# Patient Record
Sex: MALE | HISPANIC OR LATINO
[De-identification: names, ages, dates, MRNs, and addresses within clinical notes are randomized per-mention and may not be internally consistent; named-entity substitution may affect disease eponyms.]

---

## 2017-02-06 ENCOUNTER — MEDICATION RENEWAL (OUTPATIENT)
Age: 82
End: 2017-02-06

## 2017-02-10 DIAGNOSIS — R19.7 DIARRHEA, UNSPECIFIED: ICD-10-CM

## 2017-10-11 ENCOUNTER — APPOINTMENT (OUTPATIENT)
Dept: INTERNAL MEDICINE | Facility: CLINIC | Age: 82
End: 2017-10-11

## 2017-10-11 PROBLEM — R19.7 DIARRHEA, UNSPECIFIED TYPE: Status: ACTIVE | Noted: 2017-10-11

## 2018-09-21 ENCOUNTER — APPOINTMENT (OUTPATIENT)
Dept: INTERNAL MEDICINE | Facility: CLINIC | Age: 83
End: 2018-09-21

## 2019-03-18 ENCOUNTER — LABORATORY RESULT (OUTPATIENT)
Age: 84
End: 2019-03-18

## 2019-03-18 ENCOUNTER — APPOINTMENT (OUTPATIENT)
Dept: INTERNAL MEDICINE | Facility: CLINIC | Age: 84
End: 2019-03-18
Payer: MEDICARE

## 2019-03-18 VITALS — WEIGHT: 107 LBS | BODY MASS INDEX: 19.94 KG/M2 | HEIGHT: 61.42 IN

## 2019-03-18 VITALS — SYSTOLIC BLOOD PRESSURE: 150 MMHG | DIASTOLIC BLOOD PRESSURE: 80 MMHG

## 2019-03-18 DIAGNOSIS — E78.5 HYPERLIPIDEMIA, UNSPECIFIED: ICD-10-CM

## 2019-03-18 DIAGNOSIS — Z86.39 PERSONAL HISTORY OF OTHER ENDOCRINE, NUTRITIONAL AND METABOLIC DISEASE: ICD-10-CM

## 2019-03-18 DIAGNOSIS — Z87.891 PERSONAL HISTORY OF NICOTINE DEPENDENCE: ICD-10-CM

## 2019-03-18 DIAGNOSIS — D49.59 NEOPLASM UNSPECIFIED BEHAVIOR OF OTHER GENITOURINARY ORGAN: ICD-10-CM

## 2019-03-18 PROCEDURE — 99397 PER PM REEVAL EST PAT 65+ YR: CPT

## 2019-03-18 PROCEDURE — 93000 ELECTROCARDIOGRAM COMPLETE: CPT

## 2019-03-18 RX ORDER — AMLODIPINE BESYLATE 2.5 MG/1
2.5 TABLET ORAL DAILY
Qty: 90 | Refills: 1 | Status: DISCONTINUED | COMMUNITY
Start: 2017-02-06 | End: 2019-03-18

## 2019-03-18 RX ORDER — PANTOPRAZOLE 40 MG/1
40 TABLET, DELAYED RELEASE ORAL DAILY
Qty: 10 | Refills: 0 | Status: DISCONTINUED | COMMUNITY
Start: 2017-10-11 | End: 2019-03-18

## 2019-03-18 RX ORDER — L.ACIDOPH/L.BULG/B.BIF/S.THERM 1B-250 MG
TABLET ORAL
Qty: 21 | Refills: 0 | Status: DISCONTINUED | COMMUNITY
Start: 2017-10-11 | End: 2019-03-18

## 2019-03-18 NOTE — HISTORY OF PRESENT ILLNESS
[de-identified] : PT COMES FOR CPE BUT ALSO WITH CC OF R ANKLE EDEMA AFTER A FALL AND SPRAIN 10 DAYS AGO;ABLE TO WALK UNASSISTED WITH NO PAIN BUT HAS DIFFICULTY FITTING DRESS SHOES \par TAKES NO MEDS([facility] HAD BEEN RX IN THE PAST BUT STOPPED IT )

## 2019-03-18 NOTE — ASSESSMENT
[FreeTextEntry1] : CPE OF 89 Y OLD MALE WITH PMX OF HTN ON NO MED AS PER HIS PREFERENCE= LABS AND EKG TODAY\par S/P R ANKLE SPRAIN = RECOMM TO SEE POD BUT DECLINES\par RTO PRN

## 2019-03-18 NOTE — PHYSICAL EXAM
[No Acute Distress] : no acute distress [Well Nourished] : well nourished [Well Developed] : well developed [Well-Appearing] : well-appearing [Normal Sclera/Conjunctiva] : normal sclera/conjunctiva [PERRL] : pupils equal round and reactive to light [EOMI] : extraocular movements intact [Normal Outer Ear/Nose] : the outer ears and nose were normal in appearance [Normal Oropharynx] : the oropharynx was normal [No JVD] : no jugular venous distention [Supple] : supple [No Lymphadenopathy] : no lymphadenopathy [Thyroid Normal, No Nodules] : the thyroid was normal and there were no nodules present [Clear to Auscultation] : lungs were clear to auscultation bilaterally [No Respiratory Distress] : no respiratory distress  [No Accessory Muscle Use] : no accessory muscle use [Normal Rate] : normal rate  [Regular Rhythm] : with a regular rhythm [Normal S1, S2] : normal S1 and S2 [No Carotid Bruits] : no carotid bruits [No Abdominal Bruit] : a ~M bruit was not heard ~T in the abdomen [No Varicosities] : no varicosities [Pedal Pulses Present] : the pedal pulses are present [No Edema] : there was no peripheral edema [No Palpable Aorta] : no palpable aorta [No Extremity Clubbing/Cyanosis] : no extremity clubbing/cyanosis [Soft] : abdomen soft [Non Tender] : non-tender [Non-distended] : non-distended [No Masses] : no abdominal mass palpated [Normal Bowel Sounds] : normal bowel sounds [No HSM] : no HSM [Normal Anterior Cervical Nodes] : no anterior cervical lymphadenopathy [Normal Posterior Cervical Nodes] : no posterior cervical lymphadenopathy [No Joint Swelling] : no joint swelling [No CVA Tenderness] : no CVA  tenderness [No Spinal Tenderness] : no spinal tenderness [No Rash] : no rash [Grossly Normal Strength/Tone] : grossly normal strength/tone [Normal Gait] : normal gait [Coordination Grossly Intact] : coordination grossly intact [Deep Tendon Reflexes (DTR)] : deep tendon reflexes were 2+ and symmetric [No Focal Deficits] : no focal deficits [Normal Affect] : the affect was normal [Normal Insight/Judgement] : insight and judgment were intact [de-identified] : LESLY 2/6 RUSB [de-identified] : R MEDIAL MALLEOLAR EDEMA AND MINOR ECCHYMOSIS,FROM OF R ANKLE WITH MINIMAL TENDERNESS

## 2019-03-19 LAB
25(OH)D3 SERPL-MCNC: 9.8 NG/ML
ALBUMIN SERPL ELPH-MCNC: 4.2 G/DL
ALP BLD-CCNC: 84 U/L
ALT SERPL-CCNC: 21 U/L
ANION GAP SERPL CALC-SCNC: 12 MMOL/L
APPEARANCE: ABNORMAL
AST SERPL-CCNC: 24 U/L
BASOPHILS # BLD AUTO: 0.03 K/UL
BASOPHILS NFR BLD AUTO: 0.4 %
BILIRUB SERPL-MCNC: 0.2 MG/DL
BILIRUBIN URINE: NEGATIVE
BLOOD URINE: NEGATIVE
BUN SERPL-MCNC: 28 MG/DL
CALCIUM SERPL-MCNC: 9.2 MG/DL
CHLORIDE SERPL-SCNC: 102 MMOL/L
CO2 SERPL-SCNC: 23 MMOL/L
COLOR: NORMAL
CREAT SERPL-MCNC: 0.82 MG/DL
EOSINOPHIL # BLD AUTO: 0.08 K/UL
EOSINOPHIL NFR BLD AUTO: 1.1 %
GLUCOSE QUALITATIVE U: NEGATIVE
GLUCOSE SERPL-MCNC: 105 MG/DL
HCT VFR BLD CALC: 38.1 %
HGB BLD-MCNC: 11.8 G/DL
IMM GRANULOCYTES NFR BLD AUTO: 0.4 %
KETONES URINE: NEGATIVE
LEUKOCYTE ESTERASE URINE: NEGATIVE
LYMPHOCYTES # BLD AUTO: 1.01 K/UL
LYMPHOCYTES NFR BLD AUTO: 14.3 %
MAN DIFF?: NORMAL
MCHC RBC-ENTMCNC: 28.1 PG
MCHC RBC-ENTMCNC: 31 GM/DL
MCV RBC AUTO: 90.7 FL
MONOCYTES # BLD AUTO: 0.68 K/UL
MONOCYTES NFR BLD AUTO: 9.6 %
NEUTROPHILS # BLD AUTO: 5.25 K/UL
NEUTROPHILS NFR BLD AUTO: 74.2 %
NITRITE URINE: NEGATIVE
PH URINE: 5
PLATELET # BLD AUTO: 233 K/UL
POTASSIUM SERPL-SCNC: 4.2 MMOL/L
PROT SERPL-MCNC: 6.8 G/DL
PROTEIN URINE: NEGATIVE
RBC # BLD: 4.2 M/UL
RBC # FLD: 12.5 %
SODIUM SERPL-SCNC: 137 MMOL/L
SPECIFIC GRAVITY URINE: 1.02
TSH SERPL-ACNC: 0.79 UIU/ML
UROBILINOGEN URINE: NORMAL
WBC # FLD AUTO: 7.08 K/UL

## 2019-03-26 ENCOUNTER — MEDICATION RENEWAL (OUTPATIENT)
Age: 84
End: 2019-03-26

## 2019-03-26 LAB
CHOLEST SERPL-MCNC: 159 MG/DL
CHOLEST/HDLC SERPL: 2.7 RATIO
HDLC SERPL-MCNC: 60 MG/DL
LDLC SERPL CALC-MCNC: 81 MG/DL
TRIGL SERPL-MCNC: 90 MG/DL

## 2021-08-25 ENCOUNTER — NON-APPOINTMENT (OUTPATIENT)
Age: 86
End: 2021-08-25

## 2021-08-25 ENCOUNTER — APPOINTMENT (OUTPATIENT)
Dept: INTERNAL MEDICINE | Facility: CLINIC | Age: 86
End: 2021-08-25
Payer: MEDICARE

## 2021-08-25 VITALS
BODY MASS INDEX: 19.2 KG/M2 | HEART RATE: 67 BPM | SYSTOLIC BLOOD PRESSURE: 129 MMHG | RESPIRATION RATE: 14 BRPM | TEMPERATURE: 98.1 F | DIASTOLIC BLOOD PRESSURE: 64 MMHG | OXYGEN SATURATION: 96 % | WEIGHT: 103 LBS | HEIGHT: 61.5 IN

## 2021-08-25 DIAGNOSIS — I10 ESSENTIAL (PRIMARY) HYPERTENSION: ICD-10-CM

## 2021-08-25 DIAGNOSIS — K05.10 CHRONIC GINGIVITIS, PLAQUE INDUCED: ICD-10-CM

## 2021-08-25 LAB
25(OH)D3 SERPL-MCNC: 18.6 NG/ML
ALBUMIN SERPL ELPH-MCNC: 4.5 G/DL
ALP BLD-CCNC: 79 U/L
ALT SERPL-CCNC: 21 U/L
ANION GAP SERPL CALC-SCNC: 14 MMOL/L
AST SERPL-CCNC: 27 U/L
BASOPHILS # BLD AUTO: 0.04 K/UL
BASOPHILS NFR BLD AUTO: 0.4 %
BILIRUB SERPL-MCNC: 0.4 MG/DL
BUN SERPL-MCNC: 16 MG/DL
CALCIUM SERPL-MCNC: 9.5 MG/DL
CHLORIDE SERPL-SCNC: 99 MMOL/L
CHOLEST SERPL-MCNC: 197 MG/DL
CO2 SERPL-SCNC: 20 MMOL/L
CREAT SERPL-MCNC: 0.73 MG/DL
EOSINOPHIL # BLD AUTO: 0.09 K/UL
EOSINOPHIL NFR BLD AUTO: 0.9 %
GLUCOSE SERPL-MCNC: 88 MG/DL
HCT VFR BLD CALC: 38.7 %
HDLC SERPL-MCNC: 76 MG/DL
HGB BLD-MCNC: 11.8 G/DL
IMM GRANULOCYTES NFR BLD AUTO: 0.3 %
LDLC SERPL CALC-MCNC: 103 MG/DL
LYMPHOCYTES # BLD AUTO: 1.41 K/UL
LYMPHOCYTES NFR BLD AUTO: 14.1 %
MAN DIFF?: NORMAL
MCHC RBC-ENTMCNC: 27.2 PG
MCHC RBC-ENTMCNC: 30.5 GM/DL
MCV RBC AUTO: 89.2 FL
MONOCYTES # BLD AUTO: 1.05 K/UL
MONOCYTES NFR BLD AUTO: 10.5 %
NEUTROPHILS # BLD AUTO: 7.35 K/UL
NEUTROPHILS NFR BLD AUTO: 73.8 %
NONHDLC SERPL-MCNC: 120 MG/DL
PLATELET # BLD AUTO: 235 K/UL
POTASSIUM SERPL-SCNC: 4.7 MMOL/L
PROT SERPL-MCNC: 7.4 G/DL
RBC # BLD: 4.34 M/UL
RBC # FLD: 13.3 %
SODIUM SERPL-SCNC: 133 MMOL/L
TRIGL SERPL-MCNC: 86 MG/DL
TSH SERPL-ACNC: 0.8 UIU/ML
VIT B12 SERPL-MCNC: 1372 PG/ML
WBC # FLD AUTO: 9.97 K/UL

## 2021-08-25 PROCEDURE — 93000 ELECTROCARDIOGRAM COMPLETE: CPT

## 2021-08-25 PROCEDURE — 99397 PER PM REEVAL EST PAT 65+ YR: CPT

## 2021-08-25 NOTE — ASSESSMENT
[FreeTextEntry1] : CPE OF 89 Y OLD MALE = LABS AND EKG \par HX OF ANEMIA 2.5 Y AGO = NEVER SAW GI \par RECURRENT GINGIVITIS= TO SEE DENTIST ,AMOX RX SENT TILL SEEN \par HAD COVID-19 VACCINE

## 2021-08-25 NOTE — PHYSICAL EXAM
[No Acute Distress] : no acute distress [Well Nourished] : well nourished [Well Developed] : well developed [Well-Appearing] : well-appearing [Normal Sclera/Conjunctiva] : normal sclera/conjunctiva [PERRL] : pupils equal round and reactive to light [EOMI] : extraocular movements intact [No JVD] : no jugular venous distention [No Lymphadenopathy] : no lymphadenopathy [Supple] : supple [Thyroid Normal, No Nodules] : the thyroid was normal and there were no nodules present [No Respiratory Distress] : no respiratory distress  [No Accessory Muscle Use] : no accessory muscle use [Clear to Auscultation] : lungs were clear to auscultation bilaterally [Normal Rate] : normal rate  [Regular Rhythm] : with a regular rhythm [Normal S1, S2] : normal S1 and S2 [No Murmur] : no murmur heard [No Carotid Bruits] : no carotid bruits [No Abdominal Bruit] : a ~M bruit was not heard ~T in the abdomen [No Varicosities] : no varicosities [Pedal Pulses Present] : the pedal pulses are present [No Edema] : there was no peripheral edema [No Palpable Aorta] : no palpable aorta [No Extremity Clubbing/Cyanosis] : no extremity clubbing/cyanosis [Soft] : abdomen soft [Non Tender] : non-tender [Non-distended] : non-distended [No Masses] : no abdominal mass palpated [No HSM] : no HSM [Normal Bowel Sounds] : normal bowel sounds [Normal Posterior Cervical Nodes] : no posterior cervical lymphadenopathy [Normal Anterior Cervical Nodes] : no anterior cervical lymphadenopathy [No CVA Tenderness] : no CVA  tenderness [No Spinal Tenderness] : no spinal tenderness [No Joint Swelling] : no joint swelling [Grossly Normal Strength/Tone] : grossly normal strength/tone [Coordination Grossly Intact] : coordination grossly intact [No Rash] : no rash [No Focal Deficits] : no focal deficits [Normal Gait] : normal gait [Normal Affect] : the affect was normal [Normal Insight/Judgement] : insight and judgment were intact

## 2021-08-25 NOTE — HISTORY OF PRESENT ILLNESS
[de-identified] : PT COMES FOR CPE AND WITH CC OF RECURRENT GINGIVITIS ,USUALLY RX AMOX BY DENTIST BUT UNABLE TO SEE HIM YET

## 2021-08-26 LAB
APPEARANCE: CLEAR
BILIRUBIN URINE: NEGATIVE
BLOOD URINE: NEGATIVE
COLOR: NORMAL
GLUCOSE QUALITATIVE U: NEGATIVE
KETONES URINE: NEGATIVE
LEUKOCYTE ESTERASE URINE: NEGATIVE
NITRITE URINE: NEGATIVE
PH URINE: 5.5
PROTEIN URINE: NEGATIVE
SPECIFIC GRAVITY URINE: 1.01
UROBILINOGEN URINE: NORMAL

## 2021-09-28 DIAGNOSIS — D50.9 IRON DEFICIENCY ANEMIA, UNSPECIFIED: ICD-10-CM

## 2021-09-28 DIAGNOSIS — E55.9 VITAMIN D DEFICIENCY, UNSPECIFIED: ICD-10-CM

## 2021-09-28 DIAGNOSIS — E61.1 IRON DEFICIENCY: ICD-10-CM

## 2021-09-28 LAB
IRON SATN MFR SERPL: 12 %
IRON SERPL-MCNC: 41 UG/DL
TIBC SERPL-MCNC: 352 UG/DL
UIBC SERPL-MCNC: 311 UG/DL

## 2021-09-28 RX ORDER — FERROUS SULFATE TAB 325 MG (65 MG ELEMENTAL FE) 325 (65 FE) MG
325 (65 FE) TAB ORAL DAILY
Qty: 30 | Refills: 3 | Status: ACTIVE | COMMUNITY
Start: 2021-09-28 | End: 1900-01-01

## 2021-09-28 RX ORDER — ERGOCALCIFEROL 1.25 MG/1
1.25 MG CAPSULE, LIQUID FILLED ORAL
Qty: 6 | Refills: 0 | Status: COMPLETED | COMMUNITY
Start: 2019-03-26 | End: 2021-09-28

## 2021-09-28 RX ORDER — AMOXICILLIN 875 MG/1
875 TABLET, FILM COATED ORAL
Qty: 14 | Refills: 0 | Status: COMPLETED | COMMUNITY
Start: 2021-08-25 | End: 2021-09-28

## 2022-10-31 ENCOUNTER — APPOINTMENT (OUTPATIENT)
Dept: INTERNAL MEDICINE | Facility: CLINIC | Age: 87
End: 2022-10-31

## 2022-10-31 ENCOUNTER — NON-APPOINTMENT (OUTPATIENT)
Age: 87
End: 2022-10-31

## 2022-10-31 VITALS
HEIGHT: 61.5 IN | SYSTOLIC BLOOD PRESSURE: 132 MMHG | BODY MASS INDEX: 19.57 KG/M2 | WEIGHT: 105 LBS | TEMPERATURE: 98.1 F | OXYGEN SATURATION: 98 % | RESPIRATION RATE: 14 BRPM | HEART RATE: 81 BPM | DIASTOLIC BLOOD PRESSURE: 81 MMHG

## 2022-10-31 DIAGNOSIS — Z00.00 ENCOUNTER FOR GENERAL ADULT MEDICAL EXAMINATION W/OUT ABNORMAL FINDINGS: ICD-10-CM

## 2022-10-31 DIAGNOSIS — R01.1 CARDIAC MURMUR, UNSPECIFIED: ICD-10-CM

## 2022-10-31 LAB
25(OH)D3 SERPL-MCNC: 16.1 NG/ML
ALBUMIN SERPL ELPH-MCNC: 4.6 G/DL
ALP BLD-CCNC: 71 U/L
ALT SERPL-CCNC: 16 U/L
ANION GAP SERPL CALC-SCNC: 13 MMOL/L
AST SERPL-CCNC: 23 U/L
BILIRUB SERPL-MCNC: 0.4 MG/DL
BUN SERPL-MCNC: 25 MG/DL
CALCIUM SERPL-MCNC: 9.5 MG/DL
CHLORIDE SERPL-SCNC: 102 MMOL/L
CO2 SERPL-SCNC: 20 MMOL/L
CREAT SERPL-MCNC: 0.73 MG/DL
EGFR: 84 ML/MIN/1.73M2
GLUCOSE SERPL-MCNC: 102 MG/DL
IRON SATN MFR SERPL: 25 %
IRON SERPL-MCNC: 92 UG/DL
POTASSIUM SERPL-SCNC: 4.7 MMOL/L
PROT SERPL-MCNC: 7.2 G/DL
SODIUM SERPL-SCNC: 135 MMOL/L
TIBC SERPL-MCNC: 372 UG/DL
TSH SERPL-ACNC: 0.56 UIU/ML
UIBC SERPL-MCNC: 280 UG/DL
VIT B12 SERPL-MCNC: 1225 PG/ML

## 2022-10-31 PROCEDURE — 93000 ELECTROCARDIOGRAM COMPLETE: CPT

## 2022-10-31 PROCEDURE — 99397 PER PM REEVAL EST PAT 65+ YR: CPT

## 2022-11-01 LAB
APPEARANCE: CLEAR
BASOPHILS # BLD AUTO: 0.05 K/UL
BASOPHILS NFR BLD AUTO: 0.6 %
BILIRUBIN URINE: NEGATIVE
BLOOD URINE: NEGATIVE
COLOR: NORMAL
EOSINOPHIL # BLD AUTO: 0.08 K/UL
EOSINOPHIL NFR BLD AUTO: 0.9 %
GLUCOSE QUALITATIVE U: NEGATIVE
HCT VFR BLD CALC: 37.8 %
HGB BLD-MCNC: 11.9 G/DL
IMM GRANULOCYTES NFR BLD AUTO: 0.4 %
KETONES URINE: NEGATIVE
LEUKOCYTE ESTERASE URINE: NEGATIVE
LYMPHOCYTES # BLD AUTO: 0.96 K/UL
LYMPHOCYTES NFR BLD AUTO: 11.2 %
MAN DIFF?: NORMAL
MCHC RBC-ENTMCNC: 27.4 PG
MCHC RBC-ENTMCNC: 31.5 GM/DL
MCV RBC AUTO: 86.9 FL
MONOCYTES # BLD AUTO: 1.05 K/UL
MONOCYTES NFR BLD AUTO: 12.3 %
NEUTROPHILS # BLD AUTO: 6.38 K/UL
NEUTROPHILS NFR BLD AUTO: 74.6 %
NITRITE URINE: NEGATIVE
PH URINE: 5
PLATELET # BLD AUTO: 209 K/UL
PROTEIN URINE: NEGATIVE
RBC # BLD: 4.35 M/UL
RBC # FLD: 13.2 %
SPECIFIC GRAVITY URINE: 1.02
UROBILINOGEN URINE: NORMAL
WBC # FLD AUTO: 8.55 K/UL

## 2022-11-03 LAB
CHOLEST SERPL-MCNC: 195 MG/DL
HDLC SERPL-MCNC: 79 MG/DL
LDLC SERPL CALC-MCNC: 98 MG/DL
NONHDLC SERPL-MCNC: 116 MG/DL
TRIGL SERPL-MCNC: 87 MG/DL

## 2022-11-14 ENCOUNTER — APPOINTMENT (OUTPATIENT)
Dept: HEART AND VASCULAR | Facility: CLINIC | Age: 87
End: 2022-11-14

## 2022-11-21 RX ORDER — CHOLECALCIFEROL (VITAMIN D3) 50 MCG
50 MCG TABLET ORAL
Qty: 100 | Refills: 2 | Status: ACTIVE | COMMUNITY
Start: 2021-09-28 | End: 1900-01-01

## 2023-04-19 ENCOUNTER — APPOINTMENT (OUTPATIENT)
Dept: INTERNAL MEDICINE | Facility: CLINIC | Age: 88
End: 2023-04-19

## 2024-01-01 ENCOUNTER — RESULT REVIEW (OUTPATIENT)
Age: 89
End: 2024-01-01

## 2024-01-01 ENCOUNTER — INPATIENT (INPATIENT)
Facility: HOSPITAL | Age: 88
LOS: 11 days | DRG: 189 | End: 2024-11-30
Attending: STUDENT IN AN ORGANIZED HEALTH CARE EDUCATION/TRAINING PROGRAM | Admitting: STUDENT IN AN ORGANIZED HEALTH CARE EDUCATION/TRAINING PROGRAM
Payer: COMMERCIAL

## 2024-01-01 ENCOUNTER — APPOINTMENT (OUTPATIENT)
Age: 89
End: 2024-01-01

## 2024-01-01 VITALS
DIASTOLIC BLOOD PRESSURE: 67 MMHG | OXYGEN SATURATION: 96 % | SYSTOLIC BLOOD PRESSURE: 102 MMHG | HEART RATE: 83 BPM | RESPIRATION RATE: 18 BRPM | TEMPERATURE: 98 F

## 2024-01-01 VITALS
WEIGHT: 93.92 LBS | SYSTOLIC BLOOD PRESSURE: 115 MMHG | RESPIRATION RATE: 30 BRPM | DIASTOLIC BLOOD PRESSURE: 60 MMHG | HEIGHT: 61 IN | HEART RATE: 69 BPM

## 2024-01-01 DIAGNOSIS — E87.1 HYPO-OSMOLALITY AND HYPONATREMIA: ICD-10-CM

## 2024-01-01 DIAGNOSIS — I50.9 HEART FAILURE, UNSPECIFIED: ICD-10-CM

## 2024-01-01 DIAGNOSIS — Z75.8 OTHER PROBLEMS RELATED TO MEDICAL FACILITIES AND OTHER HEALTH CARE: ICD-10-CM

## 2024-01-01 DIAGNOSIS — R53.81 OTHER MALAISE: ICD-10-CM

## 2024-01-01 DIAGNOSIS — Z51.5 ENCOUNTER FOR PALLIATIVE CARE: ICD-10-CM

## 2024-01-01 DIAGNOSIS — R57.0 CARDIOGENIC SHOCK: ICD-10-CM

## 2024-01-01 DIAGNOSIS — C61 MALIGNANT NEOPLASM OF PROSTATE: ICD-10-CM

## 2024-01-01 DIAGNOSIS — J96.01 ACUTE RESPIRATORY FAILURE WITH HYPOXIA: ICD-10-CM

## 2024-01-01 DIAGNOSIS — R74.01 ELEVATION OF LEVELS OF LIVER TRANSAMINASE LEVELS: ICD-10-CM

## 2024-01-01 DIAGNOSIS — J18.9 PNEUMONIA, UNSPECIFIED ORGANISM: ICD-10-CM

## 2024-01-01 DIAGNOSIS — J44.9 CHRONIC OBSTRUCTIVE PULMONARY DISEASE, UNSPECIFIED: ICD-10-CM

## 2024-01-01 LAB
-  AZTREONAM: SIGNIFICANT CHANGE UP
-  CEFEPIME: SIGNIFICANT CHANGE UP
-  CEFTAZIDIME/AVIBACTAM: SIGNIFICANT CHANGE UP
-  CEFTAZIDIME: SIGNIFICANT CHANGE UP
-  CEFTOLOZANE/TAZOBACTAM: SIGNIFICANT CHANGE UP
-  CIPROFLOXACIN: SIGNIFICANT CHANGE UP
-  IMIPENEM: SIGNIFICANT CHANGE UP
-  LEVOFLOXACIN: SIGNIFICANT CHANGE UP
-  LEVOFLOXACIN: SIGNIFICANT CHANGE UP
-  MEROPENEM: SIGNIFICANT CHANGE UP
-  PIPERACILLIN/TAZOBACTAM: SIGNIFICANT CHANGE UP
-  TRIMETHOPRIM/SULFAMETHOXAZOLE: SIGNIFICANT CHANGE UP
A1C WITH ESTIMATED AVERAGE GLUCOSE RESULT: 5.6 % — SIGNIFICANT CHANGE UP (ref 4–5.6)
ALBUMIN SERPL ELPH-MCNC: 2 G/DL — LOW (ref 3.5–5)
ALBUMIN SERPL ELPH-MCNC: 2.3 G/DL — LOW (ref 3.5–5)
ALBUMIN SERPL ELPH-MCNC: 2.4 G/DL — LOW (ref 3.5–5)
ALBUMIN SERPL ELPH-MCNC: 2.5 G/DL — LOW (ref 3.5–5)
ALBUMIN SERPL ELPH-MCNC: 2.6 G/DL — LOW (ref 3.5–5)
ALBUMIN SERPL ELPH-MCNC: 2.6 G/DL — LOW (ref 3.5–5)
ALBUMIN SERPL ELPH-MCNC: 2.7 G/DL — LOW (ref 3.5–5)
ALBUMIN SERPL ELPH-MCNC: 2.9 G/DL — LOW (ref 3.5–5)
ALBUMIN SERPL ELPH-MCNC: 3 G/DL — LOW (ref 3.5–5)
ALBUMIN SERPL ELPH-MCNC: 3.1 G/DL — LOW (ref 3.5–5)
ALBUMIN SERPL ELPH-MCNC: 3.6 G/DL — SIGNIFICANT CHANGE UP (ref 3.5–5)
ALP SERPL-CCNC: 103 U/L — SIGNIFICANT CHANGE UP (ref 40–120)
ALP SERPL-CCNC: 111 U/L — SIGNIFICANT CHANGE UP (ref 40–120)
ALP SERPL-CCNC: 115 U/L — SIGNIFICANT CHANGE UP (ref 40–120)
ALP SERPL-CCNC: 117 U/L — SIGNIFICANT CHANGE UP (ref 40–120)
ALP SERPL-CCNC: 120 U/L — SIGNIFICANT CHANGE UP (ref 40–120)
ALP SERPL-CCNC: 122 U/L — HIGH (ref 40–120)
ALP SERPL-CCNC: 131 U/L — HIGH (ref 40–120)
ALP SERPL-CCNC: 151 U/L — HIGH (ref 40–120)
ALP SERPL-CCNC: 160 U/L — HIGH (ref 40–120)
ALP SERPL-CCNC: 177 U/L — HIGH (ref 40–120)
ALP SERPL-CCNC: 182 U/L — HIGH (ref 40–120)
ALT FLD-CCNC: 112 U/L DA — HIGH (ref 10–60)
ALT FLD-CCNC: 147 U/L DA — HIGH (ref 10–60)
ALT FLD-CCNC: 196 U/L DA — HIGH (ref 10–60)
ALT FLD-CCNC: 200 U/L DA — HIGH (ref 10–60)
ALT FLD-CCNC: 222 U/L DA — HIGH (ref 10–60)
ALT FLD-CCNC: 258 U/L DA — HIGH (ref 10–60)
ALT FLD-CCNC: 51 U/L DA — SIGNIFICANT CHANGE UP (ref 10–60)
ALT FLD-CCNC: 53 U/L DA — SIGNIFICANT CHANGE UP (ref 10–60)
ALT FLD-CCNC: 66 U/L DA — HIGH (ref 10–60)
ALT FLD-CCNC: 87 U/L DA — HIGH (ref 10–60)
ALT FLD-CCNC: SIGNIFICANT CHANGE UP U/L DA (ref 10–60)
ANION GAP SERPL CALC-SCNC: 10 MMOL/L — SIGNIFICANT CHANGE UP (ref 5–17)
ANION GAP SERPL CALC-SCNC: 11 MMOL/L — SIGNIFICANT CHANGE UP (ref 5–17)
ANION GAP SERPL CALC-SCNC: 11 MMOL/L — SIGNIFICANT CHANGE UP (ref 5–17)
ANION GAP SERPL CALC-SCNC: 14 MMOL/L — SIGNIFICANT CHANGE UP (ref 5–17)
ANION GAP SERPL CALC-SCNC: 14 MMOL/L — SIGNIFICANT CHANGE UP (ref 5–17)
ANION GAP SERPL CALC-SCNC: 2 MMOL/L — LOW (ref 5–17)
ANION GAP SERPL CALC-SCNC: 3 MMOL/L — LOW (ref 5–17)
ANION GAP SERPL CALC-SCNC: 3 MMOL/L — LOW (ref 5–17)
ANION GAP SERPL CALC-SCNC: 5 MMOL/L — SIGNIFICANT CHANGE UP (ref 5–17)
ANION GAP SERPL CALC-SCNC: 6 MMOL/L — SIGNIFICANT CHANGE UP (ref 5–17)
ANION GAP SERPL CALC-SCNC: 7 MMOL/L — SIGNIFICANT CHANGE UP (ref 5–17)
ANION GAP SERPL CALC-SCNC: 7 MMOL/L — SIGNIFICANT CHANGE UP (ref 5–17)
ANION GAP SERPL CALC-SCNC: 8 MMOL/L — SIGNIFICANT CHANGE UP (ref 5–17)
ANION GAP SERPL CALC-SCNC: 9 MMOL/L — SIGNIFICANT CHANGE UP (ref 5–17)
ANION GAP SERPL CALC-SCNC: 9 MMOL/L — SIGNIFICANT CHANGE UP (ref 5–17)
APTT BLD: 30.4 SEC — SIGNIFICANT CHANGE UP (ref 24.5–35.6)
APTT BLD: 34.9 SEC — SIGNIFICANT CHANGE UP (ref 24.5–35.6)
AST SERPL-CCNC: 101 U/L — HIGH (ref 10–40)
AST SERPL-CCNC: 118 U/L — HIGH (ref 10–40)
AST SERPL-CCNC: 151 U/L — HIGH (ref 10–40)
AST SERPL-CCNC: 169 U/L — HIGH (ref 10–40)
AST SERPL-CCNC: 22 U/L — SIGNIFICANT CHANGE UP (ref 10–40)
AST SERPL-CCNC: 227 U/L — HIGH (ref 10–40)
AST SERPL-CCNC: 25 U/L — SIGNIFICANT CHANGE UP (ref 10–40)
AST SERPL-CCNC: 27 U/L — SIGNIFICANT CHANGE UP (ref 10–40)
AST SERPL-CCNC: 36 U/L — SIGNIFICANT CHANGE UP (ref 10–40)
AST SERPL-CCNC: 61 U/L — HIGH (ref 10–40)
AST SERPL-CCNC: SIGNIFICANT CHANGE UP U/L (ref 10–40)
BASE EXCESS BLDA CALC-SCNC: -8.5 MMOL/L — LOW (ref -2–3)
BASE EXCESS BLDA CALC-SCNC: 5.1 MMOL/L — HIGH (ref -2–3)
BASE EXCESS BLDV CALC-SCNC: 1 MMOL/L — SIGNIFICANT CHANGE UP
BASOPHILS # BLD AUTO: 0 K/UL — SIGNIFICANT CHANGE UP (ref 0–0.2)
BASOPHILS # BLD AUTO: 0.01 K/UL — SIGNIFICANT CHANGE UP (ref 0–0.2)
BASOPHILS NFR BLD AUTO: 0 % — SIGNIFICANT CHANGE UP (ref 0–2)
BASOPHILS NFR BLD AUTO: 0.1 % — SIGNIFICANT CHANGE UP (ref 0–2)
BILIRUB SERPL-MCNC: 1.1 MG/DL — SIGNIFICANT CHANGE UP (ref 0.2–1.2)
BILIRUB SERPL-MCNC: 1.2 MG/DL — SIGNIFICANT CHANGE UP (ref 0.2–1.2)
BILIRUB SERPL-MCNC: 1.5 MG/DL — HIGH (ref 0.2–1.2)
BILIRUB SERPL-MCNC: 1.5 MG/DL — HIGH (ref 0.2–1.2)
BILIRUB SERPL-MCNC: 2 MG/DL — HIGH (ref 0.2–1.2)
BILIRUB SERPL-MCNC: 2.1 MG/DL — HIGH (ref 0.2–1.2)
BILIRUB SERPL-MCNC: 2.1 MG/DL — HIGH (ref 0.2–1.2)
BILIRUB SERPL-MCNC: 2.4 MG/DL — HIGH (ref 0.2–1.2)
BILIRUB SERPL-MCNC: 2.6 MG/DL — HIGH (ref 0.2–1.2)
BILIRUB SERPL-MCNC: 2.6 MG/DL — HIGH (ref 0.2–1.2)
BILIRUB SERPL-MCNC: 3.1 MG/DL — HIGH (ref 0.2–1.2)
BLANDM BLD POS QL PROBE: SIGNIFICANT CHANGE UP
BLOOD GAS COMMENTS ARTERIAL: SIGNIFICANT CHANGE UP
BLOOD GAS COMMENTS ARTERIAL: SIGNIFICANT CHANGE UP
BUN SERPL-MCNC: 18 MG/DL — SIGNIFICANT CHANGE UP (ref 7–18)
BUN SERPL-MCNC: 19 MG/DL — HIGH (ref 7–18)
BUN SERPL-MCNC: 19 MG/DL — HIGH (ref 7–18)
BUN SERPL-MCNC: 21 MG/DL — HIGH (ref 7–18)
BUN SERPL-MCNC: 25 MG/DL — HIGH (ref 7–18)
BUN SERPL-MCNC: 27 MG/DL — HIGH (ref 7–18)
BUN SERPL-MCNC: 28 MG/DL — HIGH (ref 7–18)
BUN SERPL-MCNC: 31 MG/DL — HIGH (ref 7–18)
BUN SERPL-MCNC: 33 MG/DL — HIGH (ref 7–18)
BUN SERPL-MCNC: 35 MG/DL — HIGH (ref 7–18)
BUN SERPL-MCNC: 35 MG/DL — HIGH (ref 7–18)
BUN SERPL-MCNC: 37 MG/DL — HIGH (ref 7–18)
BUN SERPL-MCNC: 37 MG/DL — HIGH (ref 7–18)
BUN SERPL-MCNC: 38 MG/DL — HIGH (ref 7–18)
BUN SERPL-MCNC: 39 MG/DL — HIGH (ref 7–18)
BUN SERPL-MCNC: 40 MG/DL — HIGH (ref 7–18)
BUN SERPL-MCNC: 43 MG/DL — HIGH (ref 7–18)
CALCIUM SERPL-MCNC: 7.8 MG/DL — LOW (ref 8.4–10.5)
CALCIUM SERPL-MCNC: 8.2 MG/DL — LOW (ref 8.4–10.5)
CALCIUM SERPL-MCNC: 8.2 MG/DL — LOW (ref 8.4–10.5)
CALCIUM SERPL-MCNC: 8.3 MG/DL — LOW (ref 8.4–10.5)
CALCIUM SERPL-MCNC: 8.4 MG/DL — SIGNIFICANT CHANGE UP (ref 8.4–10.5)
CALCIUM SERPL-MCNC: 8.4 MG/DL — SIGNIFICANT CHANGE UP (ref 8.4–10.5)
CALCIUM SERPL-MCNC: 8.5 MG/DL — SIGNIFICANT CHANGE UP (ref 8.4–10.5)
CALCIUM SERPL-MCNC: 8.6 MG/DL — SIGNIFICANT CHANGE UP (ref 8.4–10.5)
CALCIUM SERPL-MCNC: 8.7 MG/DL — SIGNIFICANT CHANGE UP (ref 8.4–10.5)
CALCIUM SERPL-MCNC: 8.8 MG/DL — SIGNIFICANT CHANGE UP (ref 8.4–10.5)
CALCIUM SERPL-MCNC: 9.1 MG/DL — SIGNIFICANT CHANGE UP (ref 8.4–10.5)
CALCIUM SERPL-MCNC: 9.2 MG/DL — SIGNIFICANT CHANGE UP (ref 8.4–10.5)
CALCIUM SERPL-MCNC: 9.3 MG/DL — SIGNIFICANT CHANGE UP (ref 8.4–10.5)
CALCIUM SERPL-MCNC: 9.5 MG/DL — SIGNIFICANT CHANGE UP (ref 8.4–10.5)
CALCIUM SERPL-MCNC: 9.5 MG/DL — SIGNIFICANT CHANGE UP (ref 8.4–10.5)
CALCIUM SERPL-MCNC: 9.7 MG/DL — SIGNIFICANT CHANGE UP (ref 8.4–10.5)
CALCIUM SERPL-MCNC: <5 MG/DL — CRITICAL LOW (ref 8.4–10.5)
CHLORIDE SERPL-SCNC: 101 MMOL/L — SIGNIFICANT CHANGE UP (ref 96–108)
CHLORIDE SERPL-SCNC: 102 MMOL/L — SIGNIFICANT CHANGE UP (ref 96–108)
CHLORIDE SERPL-SCNC: 102 MMOL/L — SIGNIFICANT CHANGE UP (ref 96–108)
CHLORIDE SERPL-SCNC: 103 MMOL/L — SIGNIFICANT CHANGE UP (ref 96–108)
CHLORIDE SERPL-SCNC: 105 MMOL/L — SIGNIFICANT CHANGE UP (ref 96–108)
CHLORIDE SERPL-SCNC: 106 MMOL/L — SIGNIFICANT CHANGE UP (ref 96–108)
CHLORIDE SERPL-SCNC: 109 MMOL/L — HIGH (ref 96–108)
CHLORIDE SERPL-SCNC: 78 MMOL/L — LOW (ref 96–108)
CHLORIDE SERPL-SCNC: 91 MMOL/L — LOW (ref 96–108)
CHLORIDE SERPL-SCNC: 92 MMOL/L — LOW (ref 96–108)
CHLORIDE SERPL-SCNC: 96 MMOL/L — SIGNIFICANT CHANGE UP (ref 96–108)
CHLORIDE SERPL-SCNC: 98 MMOL/L — SIGNIFICANT CHANGE UP (ref 96–108)
CHOLEST SERPL-MCNC: 113 MG/DL — SIGNIFICANT CHANGE UP
CO2 SERPL-SCNC: 16 MMOL/L — LOW (ref 22–31)
CO2 SERPL-SCNC: 17 MMOL/L — LOW (ref 22–31)
CO2 SERPL-SCNC: 18 MMOL/L — LOW (ref 22–31)
CO2 SERPL-SCNC: 18 MMOL/L — LOW (ref 22–31)
CO2 SERPL-SCNC: 20 MMOL/L — LOW (ref 22–31)
CO2 SERPL-SCNC: 25 MMOL/L — SIGNIFICANT CHANGE UP (ref 22–31)
CO2 SERPL-SCNC: 26 MMOL/L — SIGNIFICANT CHANGE UP (ref 22–31)
CO2 SERPL-SCNC: 27 MMOL/L — SIGNIFICANT CHANGE UP (ref 22–31)
CO2 SERPL-SCNC: 30 MMOL/L — SIGNIFICANT CHANGE UP (ref 22–31)
CO2 SERPL-SCNC: 31 MMOL/L — SIGNIFICANT CHANGE UP (ref 22–31)
CO2 SERPL-SCNC: 32 MMOL/L — HIGH (ref 22–31)
CO2 SERPL-SCNC: 32 MMOL/L — HIGH (ref 22–31)
CO2 SERPL-SCNC: 33 MMOL/L — HIGH (ref 22–31)
CO2 SERPL-SCNC: 34 MMOL/L — HIGH (ref 22–31)
CO2 SERPL-SCNC: 36 MMOL/L — HIGH (ref 22–31)
CREAT SERPL-MCNC: 0.55 MG/DL — SIGNIFICANT CHANGE UP (ref 0.5–1.3)
CREAT SERPL-MCNC: 0.58 MG/DL — SIGNIFICANT CHANGE UP (ref 0.5–1.3)
CREAT SERPL-MCNC: 0.62 MG/DL — SIGNIFICANT CHANGE UP (ref 0.5–1.3)
CREAT SERPL-MCNC: 0.64 MG/DL — SIGNIFICANT CHANGE UP (ref 0.5–1.3)
CREAT SERPL-MCNC: 0.67 MG/DL — SIGNIFICANT CHANGE UP (ref 0.5–1.3)
CREAT SERPL-MCNC: 0.75 MG/DL — SIGNIFICANT CHANGE UP (ref 0.5–1.3)
CREAT SERPL-MCNC: 0.84 MG/DL — SIGNIFICANT CHANGE UP (ref 0.5–1.3)
CREAT SERPL-MCNC: 0.85 MG/DL — SIGNIFICANT CHANGE UP (ref 0.5–1.3)
CREAT SERPL-MCNC: 0.86 MG/DL — SIGNIFICANT CHANGE UP (ref 0.5–1.3)
CREAT SERPL-MCNC: 0.88 MG/DL — SIGNIFICANT CHANGE UP (ref 0.5–1.3)
CREAT SERPL-MCNC: 0.88 MG/DL — SIGNIFICANT CHANGE UP (ref 0.5–1.3)
CREAT SERPL-MCNC: 1.01 MG/DL — SIGNIFICANT CHANGE UP (ref 0.5–1.3)
CREAT SERPL-MCNC: 1.02 MG/DL — SIGNIFICANT CHANGE UP (ref 0.5–1.3)
CREAT SERPL-MCNC: 1.03 MG/DL — SIGNIFICANT CHANGE UP (ref 0.5–1.3)
CREAT SERPL-MCNC: 1.06 MG/DL — SIGNIFICANT CHANGE UP (ref 0.5–1.3)
CREAT SERPL-MCNC: 1.06 MG/DL — SIGNIFICANT CHANGE UP (ref 0.5–1.3)
CREAT SERPL-MCNC: 1.16 MG/DL — SIGNIFICANT CHANGE UP (ref 0.5–1.3)
CULTURE RESULTS: ABNORMAL
CULTURE RESULTS: SIGNIFICANT CHANGE UP
CULTURE RESULTS: SIGNIFICANT CHANGE UP
D DIMER BLD IA.RAPID-MCNC: 315 NG/ML DDU — HIGH
EGFR: 58 ML/MIN/1.73M2 — LOW
EGFR: 64 ML/MIN/1.73M2 — SIGNIFICANT CHANGE UP
EGFR: 64 ML/MIN/1.73M2 — SIGNIFICANT CHANGE UP
EGFR: 66 ML/MIN/1.73M2 — SIGNIFICANT CHANGE UP
EGFR: 67 ML/MIN/1.73M2 — SIGNIFICANT CHANGE UP
EGFR: 68 ML/MIN/1.73M2 — SIGNIFICANT CHANGE UP
EGFR: 79 ML/MIN/1.73M2 — SIGNIFICANT CHANGE UP
EGFR: 80 ML/MIN/1.73M2 — SIGNIFICANT CHANGE UP
EGFR: 80 ML/MIN/1.73M2 — SIGNIFICANT CHANGE UP
EGFR: 83 ML/MIN/1.73M2 — SIGNIFICANT CHANGE UP
EGFR: 85 ML/MIN/1.73M2 — SIGNIFICANT CHANGE UP
EGFR: 87 ML/MIN/1.73M2 — SIGNIFICANT CHANGE UP
EGFR: 87 ML/MIN/1.73M2 — SIGNIFICANT CHANGE UP
EGFR: 89 ML/MIN/1.73M2 — SIGNIFICANT CHANGE UP
EGFR: 91 ML/MIN/1.73M2 — SIGNIFICANT CHANGE UP
EOSINOPHIL # BLD AUTO: 0.01 K/UL — SIGNIFICANT CHANGE UP (ref 0–0.5)
EOSINOPHIL # BLD AUTO: 0.02 K/UL — SIGNIFICANT CHANGE UP (ref 0–0.5)
EOSINOPHIL NFR BLD AUTO: 0.1 % — SIGNIFICANT CHANGE UP (ref 0–6)
EOSINOPHIL NFR BLD AUTO: 0.3 % — SIGNIFICANT CHANGE UP (ref 0–6)
ESTIMATED AVERAGE GLUCOSE: 114 MG/DL — SIGNIFICANT CHANGE UP (ref 68–114)
FLUAV AG NPH QL: SIGNIFICANT CHANGE UP
FLUBV AG NPH QL: SIGNIFICANT CHANGE UP
GAS PNL BLDA: SIGNIFICANT CHANGE UP
GAS PNL BLDA: SIGNIFICANT CHANGE UP
GLUCOSE BLDC GLUCOMTR-MCNC: 125 MG/DL — HIGH (ref 70–99)
GLUCOSE BLDC GLUCOMTR-MCNC: 159 MG/DL — HIGH (ref 70–99)
GLUCOSE SERPL-MCNC: 115 MG/DL — HIGH (ref 70–99)
GLUCOSE SERPL-MCNC: 117 MG/DL — HIGH (ref 70–99)
GLUCOSE SERPL-MCNC: 118 MG/DL — HIGH (ref 70–99)
GLUCOSE SERPL-MCNC: 126 MG/DL — HIGH (ref 70–99)
GLUCOSE SERPL-MCNC: 130 MG/DL — HIGH (ref 70–99)
GLUCOSE SERPL-MCNC: 133 MG/DL — HIGH (ref 70–99)
GLUCOSE SERPL-MCNC: 136 MG/DL — HIGH (ref 70–99)
GLUCOSE SERPL-MCNC: 140 MG/DL — HIGH (ref 70–99)
GLUCOSE SERPL-MCNC: 141 MG/DL — HIGH (ref 70–99)
GLUCOSE SERPL-MCNC: 151 MG/DL — HIGH (ref 70–99)
GLUCOSE SERPL-MCNC: 151 MG/DL — HIGH (ref 70–99)
GLUCOSE SERPL-MCNC: 178 MG/DL — HIGH (ref 70–99)
GLUCOSE SERPL-MCNC: 180 MG/DL — HIGH (ref 70–99)
GLUCOSE SERPL-MCNC: 196 MG/DL — HIGH (ref 70–99)
GLUCOSE SERPL-MCNC: 211 MG/DL — HIGH (ref 70–99)
GLUCOSE SERPL-MCNC: 238 MG/DL — HIGH (ref 70–99)
GLUCOSE SERPL-MCNC: 82 MG/DL — SIGNIFICANT CHANGE UP (ref 70–99)
GRAM STN FLD: ABNORMAL
HCO3 BLDA-SCNC: 16 MMOL/L — LOW (ref 21–28)
HCO3 BLDA-SCNC: 28 MMOL/L — SIGNIFICANT CHANGE UP (ref 21–28)
HCO3 BLDV-SCNC: 26 MMOL/L — SIGNIFICANT CHANGE UP (ref 22–29)
HCT VFR BLD CALC: 30 % — LOW (ref 39–50)
HCT VFR BLD CALC: 30.4 % — LOW (ref 39–50)
HCT VFR BLD CALC: 30.5 % — LOW (ref 39–50)
HCT VFR BLD CALC: 30.9 % — LOW (ref 39–50)
HCT VFR BLD CALC: 31.4 % — LOW (ref 39–50)
HCT VFR BLD CALC: 31.5 % — LOW (ref 39–50)
HCT VFR BLD CALC: 33.4 % — LOW (ref 39–50)
HCT VFR BLD CALC: 34 % — LOW (ref 39–50)
HCT VFR BLD CALC: 34.2 % — LOW (ref 39–50)
HCT VFR BLD CALC: 35 % — LOW (ref 39–50)
HCT VFR BLD CALC: 35.6 % — LOW (ref 39–50)
HCT VFR BLD CALC: 36.6 % — LOW (ref 39–50)
HDLC SERPL-MCNC: 61 MG/DL — SIGNIFICANT CHANGE UP
HGB BLD-MCNC: 10.1 G/DL — LOW (ref 13–17)
HGB BLD-MCNC: 10.2 G/DL — LOW (ref 13–17)
HGB BLD-MCNC: 10.3 G/DL — LOW (ref 13–17)
HGB BLD-MCNC: 10.3 G/DL — LOW (ref 13–17)
HGB BLD-MCNC: 10.6 G/DL — LOW (ref 13–17)
HGB BLD-MCNC: 11.1 G/DL — LOW (ref 13–17)
HGB BLD-MCNC: 11.2 G/DL — LOW (ref 13–17)
HGB BLD-MCNC: 11.4 G/DL — LOW (ref 13–17)
HGB BLD-MCNC: 11.5 G/DL — LOW (ref 13–17)
HGB BLD-MCNC: 12 G/DL — LOW (ref 13–17)
HOROWITZ INDEX BLDA+IHG-RTO: 40 — SIGNIFICANT CHANGE UP
HOROWITZ INDEX BLDA+IHG-RTO: 50 — SIGNIFICANT CHANGE UP
IMM GRANULOCYTES NFR BLD AUTO: 0.7 % — SIGNIFICANT CHANGE UP (ref 0–0.9)
IMM GRANULOCYTES NFR BLD AUTO: 0.8 % — SIGNIFICANT CHANGE UP (ref 0–0.9)
INR BLD: 1.79 RATIO — HIGH (ref 0.85–1.16)
INR BLD: 2.51 RATIO — HIGH (ref 0.85–1.16)
LACTATE SERPL-SCNC: 1.1 MMOL/L — SIGNIFICANT CHANGE UP (ref 0.7–2)
LACTATE SERPL-SCNC: 2.5 MMOL/L — HIGH (ref 0.7–2)
LACTATE SERPL-SCNC: 3.5 MMOL/L — HIGH (ref 0.7–2)
LACTATE SERPL-SCNC: 4.9 MMOL/L — CRITICAL HIGH (ref 0.7–2)
LACTATE SERPL-SCNC: 5.3 MMOL/L — CRITICAL HIGH (ref 0.7–2)
LACTATE SERPL-SCNC: 6.4 MMOL/L — CRITICAL HIGH (ref 0.7–2)
LACTATE SERPL-SCNC: 9.1 MMOL/L — CRITICAL HIGH (ref 0.7–2)
LEGIONELLA AG UR QL: NEGATIVE — SIGNIFICANT CHANGE UP
LIPID PNL WITH DIRECT LDL SERPL: 40 MG/DL — SIGNIFICANT CHANGE UP
LYMPHOCYTES # BLD AUTO: 0.24 K/UL — LOW (ref 1–3.3)
LYMPHOCYTES # BLD AUTO: 0.56 K/UL — LOW (ref 1–3.3)
LYMPHOCYTES # BLD AUTO: 2 % — LOW (ref 13–44)
LYMPHOCYTES # BLD AUTO: 8.1 % — LOW (ref 13–44)
M PNEUMO IGM SER-ACNC: 0.17 INDEX — SIGNIFICANT CHANGE UP (ref 0–0.9)
MAGNESIUM SERPL-MCNC: 2.1 MG/DL — SIGNIFICANT CHANGE UP (ref 1.6–2.6)
MAGNESIUM SERPL-MCNC: 2.1 MG/DL — SIGNIFICANT CHANGE UP (ref 1.6–2.6)
MAGNESIUM SERPL-MCNC: 2.3 MG/DL — SIGNIFICANT CHANGE UP (ref 1.6–2.6)
MAGNESIUM SERPL-MCNC: 2.4 MG/DL — SIGNIFICANT CHANGE UP (ref 1.6–2.6)
MAGNESIUM SERPL-MCNC: 2.5 MG/DL — SIGNIFICANT CHANGE UP (ref 1.6–2.6)
MAGNESIUM SERPL-MCNC: 2.6 MG/DL — SIGNIFICANT CHANGE UP (ref 1.6–2.6)
MCHC RBC-ENTMCNC: 26.8 PG — LOW (ref 27–34)
MCHC RBC-ENTMCNC: 26.8 PG — LOW (ref 27–34)
MCHC RBC-ENTMCNC: 27 PG — SIGNIFICANT CHANGE UP (ref 27–34)
MCHC RBC-ENTMCNC: 27.2 PG — SIGNIFICANT CHANGE UP (ref 27–34)
MCHC RBC-ENTMCNC: 27.3 PG — SIGNIFICANT CHANGE UP (ref 27–34)
MCHC RBC-ENTMCNC: 27.3 PG — SIGNIFICANT CHANGE UP (ref 27–34)
MCHC RBC-ENTMCNC: 27.4 PG — SIGNIFICANT CHANGE UP (ref 27–34)
MCHC RBC-ENTMCNC: 27.5 PG — SIGNIFICANT CHANGE UP (ref 27–34)
MCHC RBC-ENTMCNC: 27.8 PG — SIGNIFICANT CHANGE UP (ref 27–34)
MCHC RBC-ENTMCNC: 27.8 PG — SIGNIFICANT CHANGE UP (ref 27–34)
MCHC RBC-ENTMCNC: 28 PG — SIGNIFICANT CHANGE UP (ref 27–34)
MCHC RBC-ENTMCNC: 28.3 PG — SIGNIFICANT CHANGE UP (ref 27–34)
MCHC RBC-ENTMCNC: 31 G/DL — LOW (ref 32–36)
MCHC RBC-ENTMCNC: 31.1 G/DL — LOW (ref 32–36)
MCHC RBC-ENTMCNC: 32.1 G/DL — SIGNIFICANT CHANGE UP (ref 32–36)
MCHC RBC-ENTMCNC: 32.9 G/DL — SIGNIFICANT CHANGE UP (ref 32–36)
MCHC RBC-ENTMCNC: 32.9 G/DL — SIGNIFICANT CHANGE UP (ref 32–36)
MCHC RBC-ENTMCNC: 33.2 G/DL — SIGNIFICANT CHANGE UP (ref 32–36)
MCHC RBC-ENTMCNC: 33.3 G/DL — SIGNIFICANT CHANGE UP (ref 32–36)
MCHC RBC-ENTMCNC: 33.4 G/DL — SIGNIFICANT CHANGE UP (ref 32–36)
MCHC RBC-ENTMCNC: 33.7 G/DL — SIGNIFICANT CHANGE UP (ref 32–36)
MCHC RBC-ENTMCNC: 33.8 G/DL — SIGNIFICANT CHANGE UP (ref 32–36)
MCHC RBC-ENTMCNC: 34.3 G/DL — SIGNIFICANT CHANGE UP (ref 32–36)
MCHC RBC-ENTMCNC: 34.9 G/DL — SIGNIFICANT CHANGE UP (ref 32–36)
MCV RBC AUTO: 78.6 FL — LOW (ref 80–100)
MCV RBC AUTO: 80.9 FL — SIGNIFICANT CHANGE UP (ref 80–100)
MCV RBC AUTO: 81.9 FL — SIGNIFICANT CHANGE UP (ref 80–100)
MCV RBC AUTO: 82.4 FL — SIGNIFICANT CHANGE UP (ref 80–100)
MCV RBC AUTO: 82.6 FL — SIGNIFICANT CHANGE UP (ref 80–100)
MCV RBC AUTO: 82.9 FL — SIGNIFICANT CHANGE UP (ref 80–100)
MCV RBC AUTO: 83.3 FL — SIGNIFICANT CHANGE UP (ref 80–100)
MCV RBC AUTO: 83.3 FL — SIGNIFICANT CHANGE UP (ref 80–100)
MCV RBC AUTO: 83.8 FL — SIGNIFICANT CHANGE UP (ref 80–100)
MCV RBC AUTO: 84.2 FL — SIGNIFICANT CHANGE UP (ref 80–100)
MCV RBC AUTO: 85.9 FL — SIGNIFICANT CHANGE UP (ref 80–100)
MCV RBC AUTO: 86.6 FL — SIGNIFICANT CHANGE UP (ref 80–100)
METHOD TYPE: SIGNIFICANT CHANGE UP
MONOCYTES # BLD AUTO: 0.75 K/UL — SIGNIFICANT CHANGE UP (ref 0–0.9)
MONOCYTES # BLD AUTO: 0.88 K/UL — SIGNIFICANT CHANGE UP (ref 0–0.9)
MONOCYTES NFR BLD AUTO: 12.7 % — SIGNIFICANT CHANGE UP (ref 2–14)
MONOCYTES NFR BLD AUTO: 6.3 % — SIGNIFICANT CHANGE UP (ref 2–14)
MRSA PCR RESULT.: SIGNIFICANT CHANGE UP
MYCOPLASMA AG SPEC QL: NEGATIVE — SIGNIFICANT CHANGE UP
NEUTROPHILS # BLD AUTO: 10.74 K/UL — HIGH (ref 1.8–7.4)
NEUTROPHILS # BLD AUTO: 5.42 K/UL — SIGNIFICANT CHANGE UP (ref 1.8–7.4)
NEUTROPHILS NFR BLD AUTO: 78.2 % — HIGH (ref 43–77)
NEUTROPHILS NFR BLD AUTO: 90.7 % — HIGH (ref 43–77)
NON HDL CHOLESTEROL: 52 MG/DL — SIGNIFICANT CHANGE UP
NRBC # BLD: 0 /100 WBCS — SIGNIFICANT CHANGE UP (ref 0–0)
NRBC # BLD: 1 /100 WBCS — HIGH (ref 0–0)
NT-PROBNP SERPL-SCNC: HIGH PG/ML (ref 0–450)
ORGANISM # SPEC MICROSCOPIC CNT: ABNORMAL
PCO2 BLDA: 32 MMHG — LOW (ref 35–48)
PCO2 BLDA: 34 MMHG — LOW (ref 35–48)
PCO2 BLDV: 42 MMHG — SIGNIFICANT CHANGE UP (ref 42–55)
PH BLDA: 7.32 — LOW (ref 7.35–7.45)
PH BLDA: 7.52 — HIGH (ref 7.35–7.45)
PH BLDV: 7.4 — SIGNIFICANT CHANGE UP (ref 7.32–7.43)
PHOSPHATE SERPL-MCNC: 1.4 MG/DL — LOW (ref 2.5–4.5)
PHOSPHATE SERPL-MCNC: 2 MG/DL — LOW (ref 2.5–4.5)
PHOSPHATE SERPL-MCNC: 2.3 MG/DL — LOW (ref 2.5–4.5)
PHOSPHATE SERPL-MCNC: 2.5 MG/DL — SIGNIFICANT CHANGE UP (ref 2.5–4.5)
PHOSPHATE SERPL-MCNC: 2.8 MG/DL — SIGNIFICANT CHANGE UP (ref 2.5–4.5)
PHOSPHATE SERPL-MCNC: 3.1 MG/DL — SIGNIFICANT CHANGE UP (ref 2.5–4.5)
PHOSPHATE SERPL-MCNC: 3.4 MG/DL — SIGNIFICANT CHANGE UP (ref 2.5–4.5)
PHOSPHATE SERPL-MCNC: 4 MG/DL — SIGNIFICANT CHANGE UP (ref 2.5–4.5)
PHOSPHATE SERPL-MCNC: 4.2 MG/DL — SIGNIFICANT CHANGE UP (ref 2.5–4.5)
PLATELET # BLD AUTO: 112 K/UL — LOW (ref 150–400)
PLATELET # BLD AUTO: 122 K/UL — LOW (ref 150–400)
PLATELET # BLD AUTO: 128 K/UL — LOW (ref 150–400)
PLATELET # BLD AUTO: 132 K/UL — LOW (ref 150–400)
PLATELET # BLD AUTO: 133 K/UL — LOW (ref 150–400)
PLATELET # BLD AUTO: 133 K/UL — LOW (ref 150–400)
PLATELET # BLD AUTO: 138 K/UL — LOW (ref 150–400)
PLATELET # BLD AUTO: 142 K/UL — LOW (ref 150–400)
PLATELET # BLD AUTO: 144 K/UL — LOW (ref 150–400)
PLATELET # BLD AUTO: 146 K/UL — LOW (ref 150–400)
PLATELET # BLD AUTO: 155 K/UL — SIGNIFICANT CHANGE UP (ref 150–400)
PLATELET # BLD AUTO: 159 K/UL — SIGNIFICANT CHANGE UP (ref 150–400)
PO2 BLDA: 123 MMHG — HIGH (ref 83–108)
PO2 BLDA: 309 MMHG — HIGH (ref 83–108)
PO2 BLDV: 32 MMHG — SIGNIFICANT CHANGE UP
POTASSIUM SERPL-MCNC: 2.7 MMOL/L — CRITICAL LOW (ref 3.5–5.3)
POTASSIUM SERPL-MCNC: 3 MMOL/L — LOW (ref 3.5–5.3)
POTASSIUM SERPL-MCNC: 3.1 MMOL/L — LOW (ref 3.5–5.3)
POTASSIUM SERPL-MCNC: 3.2 MMOL/L — LOW (ref 3.5–5.3)
POTASSIUM SERPL-MCNC: 3.2 MMOL/L — LOW (ref 3.5–5.3)
POTASSIUM SERPL-MCNC: 3.3 MMOL/L — LOW (ref 3.5–5.3)
POTASSIUM SERPL-MCNC: 3.5 MMOL/L — SIGNIFICANT CHANGE UP (ref 3.5–5.3)
POTASSIUM SERPL-MCNC: 3.6 MMOL/L — SIGNIFICANT CHANGE UP (ref 3.5–5.3)
POTASSIUM SERPL-MCNC: 3.8 MMOL/L — SIGNIFICANT CHANGE UP (ref 3.5–5.3)
POTASSIUM SERPL-MCNC: 4.2 MMOL/L — SIGNIFICANT CHANGE UP (ref 3.5–5.3)
POTASSIUM SERPL-MCNC: 4.3 MMOL/L — SIGNIFICANT CHANGE UP (ref 3.5–5.3)
POTASSIUM SERPL-MCNC: 4.4 MMOL/L — SIGNIFICANT CHANGE UP (ref 3.5–5.3)
POTASSIUM SERPL-MCNC: 4.9 MMOL/L — SIGNIFICANT CHANGE UP (ref 3.5–5.3)
POTASSIUM SERPL-MCNC: 5 MMOL/L — SIGNIFICANT CHANGE UP (ref 3.5–5.3)
POTASSIUM SERPL-MCNC: 5.1 MMOL/L — SIGNIFICANT CHANGE UP (ref 3.5–5.3)
POTASSIUM SERPL-MCNC: 5.8 MMOL/L — HIGH (ref 3.5–5.3)
POTASSIUM SERPL-MCNC: 6.1 MMOL/L — HIGH (ref 3.5–5.3)
POTASSIUM SERPL-SCNC: 2.7 MMOL/L — CRITICAL LOW (ref 3.5–5.3)
POTASSIUM SERPL-SCNC: 3 MMOL/L — LOW (ref 3.5–5.3)
POTASSIUM SERPL-SCNC: 3.1 MMOL/L — LOW (ref 3.5–5.3)
POTASSIUM SERPL-SCNC: 3.2 MMOL/L — LOW (ref 3.5–5.3)
POTASSIUM SERPL-SCNC: 3.2 MMOL/L — LOW (ref 3.5–5.3)
POTASSIUM SERPL-SCNC: 3.3 MMOL/L — LOW (ref 3.5–5.3)
POTASSIUM SERPL-SCNC: 3.5 MMOL/L — SIGNIFICANT CHANGE UP (ref 3.5–5.3)
POTASSIUM SERPL-SCNC: 3.6 MMOL/L — SIGNIFICANT CHANGE UP (ref 3.5–5.3)
POTASSIUM SERPL-SCNC: 3.8 MMOL/L — SIGNIFICANT CHANGE UP (ref 3.5–5.3)
POTASSIUM SERPL-SCNC: 4.2 MMOL/L — SIGNIFICANT CHANGE UP (ref 3.5–5.3)
POTASSIUM SERPL-SCNC: 4.3 MMOL/L — SIGNIFICANT CHANGE UP (ref 3.5–5.3)
POTASSIUM SERPL-SCNC: 4.4 MMOL/L — SIGNIFICANT CHANGE UP (ref 3.5–5.3)
POTASSIUM SERPL-SCNC: 4.9 MMOL/L — SIGNIFICANT CHANGE UP (ref 3.5–5.3)
POTASSIUM SERPL-SCNC: 5 MMOL/L — SIGNIFICANT CHANGE UP (ref 3.5–5.3)
POTASSIUM SERPL-SCNC: 5.1 MMOL/L — SIGNIFICANT CHANGE UP (ref 3.5–5.3)
POTASSIUM SERPL-SCNC: 5.8 MMOL/L — HIGH (ref 3.5–5.3)
POTASSIUM SERPL-SCNC: 6.1 MMOL/L — HIGH (ref 3.5–5.3)
PROT SERPL-MCNC: 5.2 G/DL — LOW (ref 6–8.3)
PROT SERPL-MCNC: 5.4 G/DL — LOW (ref 6–8.3)
PROT SERPL-MCNC: 5.5 G/DL — LOW (ref 6–8.3)
PROT SERPL-MCNC: 5.6 G/DL — LOW (ref 6–8.3)
PROT SERPL-MCNC: 5.8 G/DL — LOW (ref 6–8.3)
PROT SERPL-MCNC: 5.8 G/DL — LOW (ref 6–8.3)
PROT SERPL-MCNC: 6 G/DL — SIGNIFICANT CHANGE UP (ref 6–8.3)
PROT SERPL-MCNC: 6.1 G/DL — SIGNIFICANT CHANGE UP (ref 6–8.3)
PROT SERPL-MCNC: 7.2 G/DL — SIGNIFICANT CHANGE UP (ref 6–8.3)
PROTHROM AB SERPL-ACNC: 20.6 SEC — HIGH (ref 9.9–13.4)
PROTHROM AB SERPL-ACNC: 29.1 SEC — HIGH (ref 9.9–13.4)
RBC # BLD: 3.64 M/UL — LOW (ref 4.2–5.8)
RBC # BLD: 3.64 M/UL — LOW (ref 4.2–5.8)
RBC # BLD: 3.71 M/UL — LOW (ref 4.2–5.8)
RBC # BLD: 3.74 M/UL — LOW (ref 4.2–5.8)
RBC # BLD: 3.87 M/UL — LOW (ref 4.2–5.8)
RBC # BLD: 3.88 M/UL — LOW (ref 4.2–5.8)
RBC # BLD: 3.95 M/UL — LOW (ref 4.2–5.8)
RBC # BLD: 4.08 M/UL — LOW (ref 4.2–5.8)
RBC # BLD: 4.08 M/UL — LOW (ref 4.2–5.8)
RBC # BLD: 4.22 M/UL — SIGNIFICANT CHANGE UP (ref 4.2–5.8)
RBC # BLD: 4.26 M/UL — SIGNIFICANT CHANGE UP (ref 4.2–5.8)
RBC # BLD: 4.31 M/UL — SIGNIFICANT CHANGE UP (ref 4.2–5.8)
RBC # FLD: 14.6 % — HIGH (ref 10.3–14.5)
RBC # FLD: 14.8 % — HIGH (ref 10.3–14.5)
RBC # FLD: 14.9 % — HIGH (ref 10.3–14.5)
RBC # FLD: 14.9 % — HIGH (ref 10.3–14.5)
RBC # FLD: 15.3 % — HIGH (ref 10.3–14.5)
RBC # FLD: 15.5 % — HIGH (ref 10.3–14.5)
RBC # FLD: 15.7 % — HIGH (ref 10.3–14.5)
RBC # FLD: 15.9 % — HIGH (ref 10.3–14.5)
RBC # FLD: 15.9 % — HIGH (ref 10.3–14.5)
RBC # FLD: 16 % — HIGH (ref 10.3–14.5)
RBC # FLD: 17.3 % — HIGH (ref 10.3–14.5)
RBC # FLD: 17.6 % — HIGH (ref 10.3–14.5)
RSV RNA NPH QL NAA+NON-PROBE: SIGNIFICANT CHANGE UP
S AUREUS DNA NOSE QL NAA+PROBE: SIGNIFICANT CHANGE UP
S PNEUM AG UR QL: NEGATIVE — SIGNIFICANT CHANGE UP
SAO2 % BLDA: 100 % — SIGNIFICANT CHANGE UP
SAO2 % BLDA: 99 % — SIGNIFICANT CHANGE UP
SAO2 % BLDV: 46.1 % — SIGNIFICANT CHANGE UP
SARS-COV-2 RNA SPEC QL NAA+PROBE: SIGNIFICANT CHANGE UP
SODIUM SERPL-SCNC: 104 MMOL/L — CRITICAL LOW (ref 135–145)
SODIUM SERPL-SCNC: 123 MMOL/L — LOW (ref 135–145)
SODIUM SERPL-SCNC: 125 MMOL/L — LOW (ref 135–145)
SODIUM SERPL-SCNC: 126 MMOL/L — LOW (ref 135–145)
SODIUM SERPL-SCNC: 131 MMOL/L — LOW (ref 135–145)
SODIUM SERPL-SCNC: 136 MMOL/L — SIGNIFICANT CHANGE UP (ref 135–145)
SODIUM SERPL-SCNC: 137 MMOL/L — SIGNIFICANT CHANGE UP (ref 135–145)
SODIUM SERPL-SCNC: 138 MMOL/L — SIGNIFICANT CHANGE UP (ref 135–145)
SODIUM SERPL-SCNC: 140 MMOL/L — SIGNIFICANT CHANGE UP (ref 135–145)
SODIUM SERPL-SCNC: 142 MMOL/L — SIGNIFICANT CHANGE UP (ref 135–145)
SODIUM SERPL-SCNC: 144 MMOL/L — SIGNIFICANT CHANGE UP (ref 135–145)
SODIUM SERPL-SCNC: 146 MMOL/L — HIGH (ref 135–145)
SODIUM SERPL-SCNC: 148 MMOL/L — HIGH (ref 135–145)
SPECIMEN SOURCE: SIGNIFICANT CHANGE UP
TRIGL SERPL-MCNC: 61 MG/DL — SIGNIFICANT CHANGE UP
TROPONIN I, HIGH SENSITIVITY RESULT: 53.5 NG/L — SIGNIFICANT CHANGE UP
TROPONIN I, HIGH SENSITIVITY RESULT: 60.6 NG/L — SIGNIFICANT CHANGE UP
WBC # BLD: 10.15 K/UL — SIGNIFICANT CHANGE UP (ref 3.8–10.5)
WBC # BLD: 11.85 K/UL — HIGH (ref 3.8–10.5)
WBC # BLD: 13.5 K/UL — HIGH (ref 3.8–10.5)
WBC # BLD: 6.64 K/UL — SIGNIFICANT CHANGE UP (ref 3.8–10.5)
WBC # BLD: 6.93 K/UL — SIGNIFICANT CHANGE UP (ref 3.8–10.5)
WBC # BLD: 7.71 K/UL — SIGNIFICANT CHANGE UP (ref 3.8–10.5)
WBC # BLD: 7.9 K/UL — SIGNIFICANT CHANGE UP (ref 3.8–10.5)
WBC # BLD: 8.27 K/UL — SIGNIFICANT CHANGE UP (ref 3.8–10.5)
WBC # BLD: 8.88 K/UL — SIGNIFICANT CHANGE UP (ref 3.8–10.5)
WBC # BLD: 9.2 K/UL — SIGNIFICANT CHANGE UP (ref 3.8–10.5)
WBC # BLD: 9.63 K/UL — SIGNIFICANT CHANGE UP (ref 3.8–10.5)
WBC # BLD: 9.66 K/UL — SIGNIFICANT CHANGE UP (ref 3.8–10.5)
WBC # FLD AUTO: 10.15 K/UL — SIGNIFICANT CHANGE UP (ref 3.8–10.5)
WBC # FLD AUTO: 11.85 K/UL — HIGH (ref 3.8–10.5)
WBC # FLD AUTO: 13.5 K/UL — HIGH (ref 3.8–10.5)
WBC # FLD AUTO: 6.64 K/UL — SIGNIFICANT CHANGE UP (ref 3.8–10.5)
WBC # FLD AUTO: 6.93 K/UL — SIGNIFICANT CHANGE UP (ref 3.8–10.5)
WBC # FLD AUTO: 7.71 K/UL — SIGNIFICANT CHANGE UP (ref 3.8–10.5)
WBC # FLD AUTO: 7.9 K/UL — SIGNIFICANT CHANGE UP (ref 3.8–10.5)
WBC # FLD AUTO: 8.27 K/UL — SIGNIFICANT CHANGE UP (ref 3.8–10.5)
WBC # FLD AUTO: 8.88 K/UL — SIGNIFICANT CHANGE UP (ref 3.8–10.5)
WBC # FLD AUTO: 9.2 K/UL — SIGNIFICANT CHANGE UP (ref 3.8–10.5)
WBC # FLD AUTO: 9.63 K/UL — SIGNIFICANT CHANGE UP (ref 3.8–10.5)
WBC # FLD AUTO: 9.66 K/UL — SIGNIFICANT CHANGE UP (ref 3.8–10.5)

## 2024-01-01 PROCEDURE — 99232 SBSQ HOSP IP/OBS MODERATE 35: CPT

## 2024-01-01 PROCEDURE — 80061 LIPID PANEL: CPT

## 2024-01-01 PROCEDURE — 31500 INSERT EMERGENCY AIRWAY: CPT

## 2024-01-01 PROCEDURE — 99233 SBSQ HOSP IP/OBS HIGH 50: CPT

## 2024-01-01 PROCEDURE — 99223 1ST HOSP IP/OBS HIGH 75: CPT

## 2024-01-01 PROCEDURE — 85610 PROTHROMBIN TIME: CPT

## 2024-01-01 PROCEDURE — 83605 ASSAY OF LACTIC ACID: CPT

## 2024-01-01 PROCEDURE — 76705 ECHO EXAM OF ABDOMEN: CPT

## 2024-01-01 PROCEDURE — 74177 CT ABD & PELVIS W/CONTRAST: CPT | Mod: 26,MC

## 2024-01-01 PROCEDURE — 83735 ASSAY OF MAGNESIUM: CPT

## 2024-01-01 PROCEDURE — 36000 PLACE NEEDLE IN VEIN: CPT

## 2024-01-01 PROCEDURE — 70450 CT HEAD/BRAIN W/O DYE: CPT | Mod: MC

## 2024-01-01 PROCEDURE — 87899 AGENT NOS ASSAY W/OPTIC: CPT

## 2024-01-01 PROCEDURE — 96375 TX/PRO/DX INJ NEW DRUG ADDON: CPT

## 2024-01-01 PROCEDURE — 70450 CT HEAD/BRAIN W/O DYE: CPT | Mod: 26

## 2024-01-01 PROCEDURE — 85730 THROMBOPLASTIN TIME PARTIAL: CPT

## 2024-01-01 PROCEDURE — 86738 MYCOPLASMA ANTIBODY: CPT

## 2024-01-01 PROCEDURE — 83036 HEMOGLOBIN GLYCOSYLATED A1C: CPT

## 2024-01-01 PROCEDURE — 76705 ECHO EXAM OF ABDOMEN: CPT | Mod: 26

## 2024-01-01 PROCEDURE — 71250 CT THORAX DX C-: CPT | Mod: 26

## 2024-01-01 PROCEDURE — 92610 EVALUATE SWALLOWING FUNCTION: CPT

## 2024-01-01 PROCEDURE — 87449 NOS EACH ORGANISM AG IA: CPT

## 2024-01-01 PROCEDURE — 84100 ASSAY OF PHOSPHORUS: CPT

## 2024-01-01 PROCEDURE — 87040 BLOOD CULTURE FOR BACTERIA: CPT

## 2024-01-01 PROCEDURE — 94640 AIRWAY INHALATION TREATMENT: CPT

## 2024-01-01 PROCEDURE — 97161 PT EVAL LOW COMPLEX 20 MIN: CPT

## 2024-01-01 PROCEDURE — 85027 COMPLETE CBC AUTOMATED: CPT

## 2024-01-01 PROCEDURE — 82803 BLOOD GASES ANY COMBINATION: CPT

## 2024-01-01 PROCEDURE — 80048 BASIC METABOLIC PNL TOTAL CA: CPT

## 2024-01-01 PROCEDURE — 99497 ADVNCD CARE PLAN 30 MIN: CPT

## 2024-01-01 PROCEDURE — 99291 CRITICAL CARE FIRST HOUR: CPT | Mod: 25

## 2024-01-01 PROCEDURE — 84132 ASSAY OF SERUM POTASSIUM: CPT

## 2024-01-01 PROCEDURE — 36415 COLL VENOUS BLD VENIPUNCTURE: CPT

## 2024-01-01 PROCEDURE — 82330 ASSAY OF CALCIUM: CPT

## 2024-01-01 PROCEDURE — 71045 X-RAY EXAM CHEST 1 VIEW: CPT | Mod: 26

## 2024-01-01 PROCEDURE — 99291 CRITICAL CARE FIRST HOUR: CPT | Mod: GC

## 2024-01-01 PROCEDURE — 87077 CULTURE AEROBIC IDENTIFY: CPT

## 2024-01-01 PROCEDURE — 99222 1ST HOSP IP/OBS MODERATE 55: CPT | Mod: GC

## 2024-01-01 PROCEDURE — 87640 STAPH A DNA AMP PROBE: CPT

## 2024-01-01 PROCEDURE — 85379 FIBRIN DEGRADATION QUANT: CPT

## 2024-01-01 PROCEDURE — 87070 CULTURE OTHR SPECIMN AEROBIC: CPT

## 2024-01-01 PROCEDURE — 87205 SMEAR GRAM STAIN: CPT

## 2024-01-01 PROCEDURE — 84484 ASSAY OF TROPONIN QUANT: CPT

## 2024-01-01 PROCEDURE — 71045 X-RAY EXAM CHEST 1 VIEW: CPT

## 2024-01-01 PROCEDURE — 96374 THER/PROPH/DIAG INJ IV PUSH: CPT

## 2024-01-01 PROCEDURE — 87641 MR-STAPH DNA AMP PROBE: CPT

## 2024-01-01 PROCEDURE — 71250 CT THORAX DX C-: CPT | Mod: MC

## 2024-01-01 PROCEDURE — 85025 COMPLETE CBC W/AUTO DIFF WBC: CPT

## 2024-01-01 PROCEDURE — 83880 ASSAY OF NATRIURETIC PEPTIDE: CPT

## 2024-01-01 PROCEDURE — 93306 TTE W/DOPPLER COMPLETE: CPT

## 2024-01-01 PROCEDURE — 71275 CT ANGIOGRAPHY CHEST: CPT | Mod: 26,MC

## 2024-01-01 PROCEDURE — 71045 X-RAY EXAM CHEST 1 VIEW: CPT | Mod: 26,76

## 2024-01-01 PROCEDURE — 74177 CT ABD & PELVIS W/CONTRAST: CPT | Mod: MC

## 2024-01-01 PROCEDURE — 93005 ELECTROCARDIOGRAM TRACING: CPT

## 2024-01-01 PROCEDURE — 87637 SARSCOV2&INF A&B&RSV AMP PRB: CPT

## 2024-01-01 PROCEDURE — 71275 CT ANGIOGRAPHY CHEST: CPT | Mod: MC

## 2024-01-01 PROCEDURE — 99231 SBSQ HOSP IP/OBS SF/LOW 25: CPT

## 2024-01-01 PROCEDURE — 92526 ORAL FUNCTION THERAPY: CPT

## 2024-01-01 PROCEDURE — 96372 THER/PROPH/DIAG INJ SC/IM: CPT

## 2024-01-01 PROCEDURE — 94003 VENT MGMT INPAT SUBQ DAY: CPT

## 2024-01-01 PROCEDURE — 43752 NASAL/OROGASTRIC W/TUBE PLMT: CPT

## 2024-01-01 PROCEDURE — 84295 ASSAY OF SERUM SODIUM: CPT

## 2024-01-01 PROCEDURE — 87186 SC STD MICRODIL/AGAR DIL: CPT

## 2024-01-01 PROCEDURE — 82962 GLUCOSE BLOOD TEST: CPT

## 2024-01-01 PROCEDURE — 99222 1ST HOSP IP/OBS MODERATE 55: CPT

## 2024-01-01 PROCEDURE — 80053 COMPREHEN METABOLIC PANEL: CPT

## 2024-01-01 PROCEDURE — 94002 VENT MGMT INPAT INIT DAY: CPT

## 2024-01-01 RX ORDER — SODIUM CHLORIDE 9 MG/ML
2500 INJECTION, SOLUTION INTRAMUSCULAR; INTRAVENOUS; SUBCUTANEOUS ONCE
Refills: 0 | Status: DISCONTINUED | OUTPATIENT
Start: 2024-01-01 | End: 2024-01-01

## 2024-01-01 RX ORDER — POTASSIUM CHLORIDE 600 MG/1
40 TABLET, EXTENDED RELEASE ORAL ONCE
Refills: 0 | Status: DISCONTINUED | OUTPATIENT
Start: 2024-01-01 | End: 2024-01-01

## 2024-01-01 RX ORDER — SODIUM,POTASSIUM PHOSPHATES 278-250MG
2 POWDER IN PACKET (EA) ORAL ONCE
Refills: 0 | Status: COMPLETED | OUTPATIENT
Start: 2024-01-01 | End: 2024-01-01

## 2024-01-01 RX ORDER — CHLORHEXIDINE GLUCONATE 1.2 MG/ML
1 RINSE ORAL
Refills: 0 | Status: DISCONTINUED | OUTPATIENT
Start: 2024-01-01 | End: 2024-01-01

## 2024-01-01 RX ORDER — INSULIN REG, HUM S-S BUFF 100/ML
5 VIAL (ML) INJECTION ONCE
Refills: 0 | Status: COMPLETED | OUTPATIENT
Start: 2024-01-01 | End: 2024-01-01

## 2024-01-01 RX ORDER — AZITHROMYCIN 250 MG/1
500 TABLET, FILM COATED ORAL EVERY 24 HOURS
Refills: 0 | Status: DISCONTINUED | OUTPATIENT
Start: 2024-01-01 | End: 2024-01-01

## 2024-01-01 RX ORDER — POTASSIUM CHLORIDE 600 MG/1
10 TABLET, EXTENDED RELEASE ORAL
Refills: 0 | Status: COMPLETED | OUTPATIENT
Start: 2024-01-01 | End: 2024-01-01

## 2024-01-01 RX ORDER — IPRATROPIUM BROMIDE AND ALBUTEROL SULFATE 2.5; .5 MG/3ML; MG/3ML
3 SOLUTION RESPIRATORY (INHALATION) EVERY 6 HOURS
Refills: 0 | Status: DISCONTINUED | OUTPATIENT
Start: 2024-01-01 | End: 2024-01-01

## 2024-01-01 RX ORDER — CEFTRIAXONE SODIUM 1 G
1000 VIAL (EA) INJECTION EVERY 24 HOURS
Refills: 0 | Status: DISCONTINUED | OUTPATIENT
Start: 2024-01-01 | End: 2024-01-01

## 2024-01-01 RX ORDER — FUROSEMIDE 40 MG/1
40 TABLET ORAL DAILY
Refills: 0 | Status: DISCONTINUED | OUTPATIENT
Start: 2024-01-01 | End: 2024-01-01

## 2024-01-01 RX ORDER — POTASSIUM CHLORIDE 600 MG/1
40 TABLET, EXTENDED RELEASE ORAL EVERY 4 HOURS
Refills: 0 | Status: COMPLETED | OUTPATIENT
Start: 2024-01-01 | End: 2024-01-01

## 2024-01-01 RX ORDER — ACETAMINOPHEN, DIPHENHYDRAMINE HCL, PHENYLEPHRINE HCL 325; 25; 5 MG/1; MG/1; MG/1
5 TABLET ORAL AT BEDTIME
Refills: 0 | Status: DISCONTINUED | OUTPATIENT
Start: 2024-01-01 | End: 2024-01-01

## 2024-01-01 RX ORDER — SODIUM CHLORIDE 9 MG/ML
2000 INJECTION, SOLUTION INTRAMUSCULAR; INTRAVENOUS; SUBCUTANEOUS ONCE
Refills: 0 | Status: COMPLETED | OUTPATIENT
Start: 2024-01-01 | End: 2024-01-01

## 2024-01-01 RX ORDER — HALOPERIDOL 2 MG
2.5 TABLET ORAL ONCE
Refills: 0 | Status: COMPLETED | OUTPATIENT
Start: 2024-01-01 | End: 2024-01-01

## 2024-01-01 RX ORDER — CEFTRIAXONE SODIUM 1 G
1000 VIAL (EA) INJECTION ONCE
Refills: 0 | Status: COMPLETED | OUTPATIENT
Start: 2024-01-01 | End: 2024-01-01

## 2024-01-01 RX ORDER — FLUTICASONE PROPIONATE AND SALMETEROL XINAFOATE 45; 21 UG/1; UG/1
1 AEROSOL, METERED RESPIRATORY (INHALATION)
Refills: 0 | Status: DISCONTINUED | OUTPATIENT
Start: 2024-01-01 | End: 2024-01-01

## 2024-01-01 RX ORDER — PROPOFOL 10 MG/ML
10 INJECTION, EMULSION INTRAVENOUS
Qty: 1000 | Refills: 0 | Status: DISCONTINUED | OUTPATIENT
Start: 2024-01-01 | End: 2024-01-01

## 2024-01-01 RX ORDER — ACETAMINOPHEN 500MG 500 MG/1
1000 TABLET, COATED ORAL ONCE
Refills: 0 | Status: COMPLETED | OUTPATIENT
Start: 2024-01-01 | End: 2024-01-01

## 2024-01-01 RX ORDER — ROCURONIUM BROMIDE 10 MG/ML
100 INJECTION INTRAVENOUS ONCE
Refills: 0 | Status: COMPLETED | OUTPATIENT
Start: 2024-01-01 | End: 2024-01-01

## 2024-01-01 RX ORDER — PANTOPRAZOLE SODIUM 40 MG/1
40 TABLET, DELAYED RELEASE ORAL DAILY
Refills: 0 | Status: DISCONTINUED | OUTPATIENT
Start: 2024-01-01 | End: 2024-01-01

## 2024-01-01 RX ORDER — POLYETHYLENE GLYCOL 3350 17 G/17G
17 POWDER, FOR SOLUTION ORAL DAILY
Refills: 0 | Status: DISCONTINUED | OUTPATIENT
Start: 2024-01-01 | End: 2024-01-01

## 2024-01-01 RX ORDER — CHLORHEXIDINE GLUCONATE 1.2 MG/ML
15 RINSE ORAL EVERY 12 HOURS
Refills: 0 | Status: DISCONTINUED | OUTPATIENT
Start: 2024-01-01 | End: 2024-01-01

## 2024-01-01 RX ORDER — BUDESONIDE/FORMOTEROL FUMARATE 80-4.5 MCG
2 HFA AEROSOL WITH ADAPTER (GRAM) INHALATION
Refills: 0 | DISCHARGE

## 2024-01-01 RX ORDER — CEFTOLOZANE AND TAZOBACTAM 1; .5 G/10ML; G/10ML
1500 INJECTION, POWDER, LYOPHILIZED, FOR SOLUTION INTRAVENOUS EVERY 8 HOURS
Refills: 0 | Status: DISCONTINUED | OUTPATIENT
Start: 2024-01-01 | End: 2024-01-01

## 2024-01-01 RX ORDER — 0.9 % SODIUM CHLORIDE 0.9 %
500 INTRAVENOUS SOLUTION INTRAVENOUS ONCE
Refills: 0 | Status: COMPLETED | OUTPATIENT
Start: 2024-01-01 | End: 2024-01-01

## 2024-01-01 RX ORDER — SODIUM ZIRCONIUM CYCLOSILICATE 5 G/5G
5 POWDER, FOR SUSPENSION ORAL ONCE
Refills: 0 | Status: COMPLETED | OUTPATIENT
Start: 2024-01-01 | End: 2024-01-01

## 2024-01-01 RX ORDER — SODIUM CHLORIDE 9 MG/ML
1000 INJECTION, SOLUTION INTRAMUSCULAR; INTRAVENOUS; SUBCUTANEOUS ONCE
Refills: 0 | Status: COMPLETED | OUTPATIENT
Start: 2024-01-01 | End: 2024-01-01

## 2024-01-01 RX ORDER — FUROSEMIDE 40 MG/1
40 TABLET ORAL
Refills: 0 | Status: DISCONTINUED | OUTPATIENT
Start: 2024-01-01 | End: 2024-01-01

## 2024-01-01 RX ORDER — ALBUTEROL 90 MCG
10 AEROSOL (GRAM) INHALATION ONCE
Refills: 0 | Status: COMPLETED | OUTPATIENT
Start: 2024-01-01 | End: 2024-01-01

## 2024-01-01 RX ORDER — ATROPINE 2 MG/.7ML
1 INJECTION INTRAMUSCULAR ONCE
Refills: 0 | Status: COMPLETED | OUTPATIENT
Start: 2024-01-01 | End: 2024-01-01

## 2024-01-01 RX ORDER — POTASSIUM CHLORIDE 600 MG/1
40 TABLET, EXTENDED RELEASE ORAL ONCE
Refills: 0 | Status: COMPLETED | OUTPATIENT
Start: 2024-01-01 | End: 2024-01-01

## 2024-01-01 RX ORDER — SODIUM,POTASSIUM PHOSPHATES 278-250MG
1 POWDER IN PACKET (EA) ORAL ONCE
Refills: 0 | Status: DISCONTINUED | OUTPATIENT
Start: 2024-01-01 | End: 2024-01-01

## 2024-01-01 RX ORDER — POTASSIUM CHLORIDE 600 MG/1
40 TABLET, EXTENDED RELEASE ORAL EVERY 4 HOURS
Refills: 0 | Status: DISCONTINUED | OUTPATIENT
Start: 2024-01-01 | End: 2024-01-01

## 2024-01-01 RX ORDER — ENOXAPARIN SODIUM 30 MG/.3ML
30 INJECTION SUBCUTANEOUS EVERY 24 HOURS
Refills: 0 | Status: DISCONTINUED | OUTPATIENT
Start: 2024-01-01 | End: 2024-01-01

## 2024-01-01 RX ORDER — DOBUTAMINE 12.5 MG/ML
2.5 INJECTION, SOLUTION, CONCENTRATE INTRAVENOUS
Qty: 500 | Refills: 0 | Status: DISCONTINUED | OUTPATIENT
Start: 2024-01-01 | End: 2024-01-01

## 2024-01-01 RX ORDER — POTASSIUM PHOSPHATE, MONOBASIC POTASSIUM PHOSPHATE, DIBASIC INJECTION, 236; 224 MG/ML; MG/ML
15 SOLUTION, CONCENTRATE INTRAVENOUS ONCE
Refills: 0 | Status: COMPLETED | OUTPATIENT
Start: 2024-01-01 | End: 2024-01-01

## 2024-01-01 RX ORDER — AZITHROMYCIN 250 MG/1
500 TABLET, FILM COATED ORAL ONCE
Refills: 0 | Status: COMPLETED | OUTPATIENT
Start: 2024-01-01 | End: 2024-01-01

## 2024-01-01 RX ORDER — ALBUTEROL 90 MCG
3 AEROSOL (GRAM) INHALATION
Refills: 0 | DISCHARGE

## 2024-01-01 RX ORDER — METHYLPREDNISOLONE SOD SUCC 125 MG
125 VIAL (EA) INJECTION ONCE
Refills: 0 | Status: COMPLETED | OUTPATIENT
Start: 2024-01-01 | End: 2024-01-01

## 2024-01-01 RX ORDER — ACETAMINOPHEN 500MG 500 MG/1
650 TABLET, COATED ORAL EVERY 6 HOURS
Refills: 0 | Status: DISCONTINUED | OUTPATIENT
Start: 2024-01-01 | End: 2024-01-01

## 2024-01-01 RX ORDER — ETOMIDATE 2 MG/ML
20 INJECTION, SOLUTION INTRAVENOUS ONCE
Refills: 0 | Status: COMPLETED | OUTPATIENT
Start: 2024-01-01 | End: 2024-01-01

## 2024-01-01 RX ORDER — IPRATROPIUM BROMIDE AND ALBUTEROL SULFATE 2.5; .5 MG/3ML; MG/3ML
3 SOLUTION RESPIRATORY (INHALATION) ONCE
Refills: 0 | Status: COMPLETED | OUTPATIENT
Start: 2024-01-01 | End: 2024-01-01

## 2024-01-01 RX ORDER — PROPOFOL 10 MG/ML
5 INJECTION, EMULSION INTRAVENOUS
Qty: 1000 | Refills: 0 | Status: DISCONTINUED | OUTPATIENT
Start: 2024-01-01 | End: 2024-01-01

## 2024-01-01 RX ADMIN — FUROSEMIDE 40 MILLIGRAM(S): 40 TABLET ORAL at 12:08

## 2024-01-01 RX ADMIN — POTASSIUM CHLORIDE 40 MILLIEQUIVALENT(S): 600 TABLET, EXTENDED RELEASE ORAL at 17:23

## 2024-01-01 RX ADMIN — IPRATROPIUM BROMIDE AND ALBUTEROL SULFATE 3 MILLILITER(S): 2.5; .5 SOLUTION RESPIRATORY (INHALATION) at 08:54

## 2024-01-01 RX ADMIN — Medication 2 PACKET(S): at 11:10

## 2024-01-01 RX ADMIN — POTASSIUM CHLORIDE 100 MILLIEQUIVALENT(S): 600 TABLET, EXTENDED RELEASE ORAL at 10:19

## 2024-01-01 RX ADMIN — ENOXAPARIN SODIUM 30 MILLIGRAM(S): 30 INJECTION SUBCUTANEOUS at 05:29

## 2024-01-01 RX ADMIN — CHLORHEXIDINE GLUCONATE 1 APPLICATION(S): 1.2 RINSE ORAL at 05:48

## 2024-01-01 RX ADMIN — CEFTOLOZANE AND TAZOBACTAM 100 MILLIGRAM(S): 1; .5 INJECTION, POWDER, LYOPHILIZED, FOR SOLUTION INTRAVENOUS at 22:06

## 2024-01-01 RX ADMIN — PROPOFOL 3.22 MICROGRAM(S)/KG/MIN: 10 INJECTION, EMULSION INTRAVENOUS at 01:51

## 2024-01-01 RX ADMIN — PROPOFOL 1.28 MICROGRAM(S)/KG/MIN: 10 INJECTION, EMULSION INTRAVENOUS at 21:12

## 2024-01-01 RX ADMIN — AZITHROMYCIN 255 MILLIGRAM(S): 250 TABLET, FILM COATED ORAL at 18:58

## 2024-01-01 RX ADMIN — CHLORHEXIDINE GLUCONATE 1 APPLICATION(S): 1.2 RINSE ORAL at 05:34

## 2024-01-01 RX ADMIN — PANTOPRAZOLE SODIUM 40 MILLIGRAM(S): 40 TABLET, DELAYED RELEASE ORAL at 11:10

## 2024-01-01 RX ADMIN — FLUTICASONE PROPIONATE AND SALMETEROL XINAFOATE 1 DOSE(S): 45; 21 AEROSOL, METERED RESPIRATORY (INHALATION) at 10:00

## 2024-01-01 RX ADMIN — FLUTICASONE PROPIONATE AND SALMETEROL XINAFOATE 1 DOSE(S): 45; 21 AEROSOL, METERED RESPIRATORY (INHALATION) at 09:32

## 2024-01-01 RX ADMIN — SODIUM CHLORIDE 1000 MILLILITER(S): 9 INJECTION, SOLUTION INTRAMUSCULAR; INTRAVENOUS; SUBCUTANEOUS at 21:13

## 2024-01-01 RX ADMIN — IPRATROPIUM BROMIDE AND ALBUTEROL SULFATE 3 MILLILITER(S): 2.5; .5 SOLUTION RESPIRATORY (INHALATION) at 02:50

## 2024-01-01 RX ADMIN — CEFTOLOZANE AND TAZOBACTAM 100 MILLIGRAM(S): 1; .5 INJECTION, POWDER, LYOPHILIZED, FOR SOLUTION INTRAVENOUS at 13:40

## 2024-01-01 RX ADMIN — ENOXAPARIN SODIUM 30 MILLIGRAM(S): 30 INJECTION SUBCUTANEOUS at 05:54

## 2024-01-01 RX ADMIN — CEFTOLOZANE AND TAZOBACTAM 100 MILLIGRAM(S): 1; .5 INJECTION, POWDER, LYOPHILIZED, FOR SOLUTION INTRAVENOUS at 21:42

## 2024-01-01 RX ADMIN — PANTOPRAZOLE SODIUM 40 MILLIGRAM(S): 40 TABLET, DELAYED RELEASE ORAL at 11:43

## 2024-01-01 RX ADMIN — ENOXAPARIN SODIUM 30 MILLIGRAM(S): 30 INJECTION SUBCUTANEOUS at 05:15

## 2024-01-01 RX ADMIN — IPRATROPIUM BROMIDE AND ALBUTEROL SULFATE 3 MILLILITER(S): 2.5; .5 SOLUTION RESPIRATORY (INHALATION) at 17:04

## 2024-01-01 RX ADMIN — Medication 2.5 MILLIGRAM(S): at 21:13

## 2024-01-01 RX ADMIN — CHLORHEXIDINE GLUCONATE 1 APPLICATION(S): 1.2 RINSE ORAL at 05:20

## 2024-01-01 RX ADMIN — CEFTOLOZANE AND TAZOBACTAM 33.33 MILLIGRAM(S): 1; .5 INJECTION, POWDER, LYOPHILIZED, FOR SOLUTION INTRAVENOUS at 18:28

## 2024-01-01 RX ADMIN — CEFTOLOZANE AND TAZOBACTAM 100 MILLIGRAM(S): 1; .5 INJECTION, POWDER, LYOPHILIZED, FOR SOLUTION INTRAVENOUS at 14:32

## 2024-01-01 RX ADMIN — ENOXAPARIN SODIUM 30 MILLIGRAM(S): 30 INJECTION SUBCUTANEOUS at 05:20

## 2024-01-01 RX ADMIN — PANTOPRAZOLE SODIUM 40 MILLIGRAM(S): 40 TABLET, DELAYED RELEASE ORAL at 12:07

## 2024-01-01 RX ADMIN — ACETAMINOPHEN 500MG 650 MILLIGRAM(S): 500 TABLET, COATED ORAL at 22:48

## 2024-01-01 RX ADMIN — CHLORHEXIDINE GLUCONATE 15 MILLILITER(S): 1.2 RINSE ORAL at 05:29

## 2024-01-01 RX ADMIN — FLUTICASONE PROPIONATE AND SALMETEROL XINAFOATE 1 DOSE(S): 45; 21 AEROSOL, METERED RESPIRATORY (INHALATION) at 21:27

## 2024-01-01 RX ADMIN — POTASSIUM CHLORIDE 100 MILLIEQUIVALENT(S): 600 TABLET, EXTENDED RELEASE ORAL at 17:23

## 2024-01-01 RX ADMIN — FLUTICASONE PROPIONATE AND SALMETEROL XINAFOATE 1 DOSE(S): 45; 21 AEROSOL, METERED RESPIRATORY (INHALATION) at 11:13

## 2024-01-01 RX ADMIN — Medication 2 PACKET(S): at 05:57

## 2024-01-01 RX ADMIN — ACETAMINOPHEN 500MG 650 MILLIGRAM(S): 500 TABLET, COATED ORAL at 23:48

## 2024-01-01 RX ADMIN — ENOXAPARIN SODIUM 30 MILLIGRAM(S): 30 INJECTION SUBCUTANEOUS at 06:14

## 2024-01-01 RX ADMIN — ENOXAPARIN SODIUM 30 MILLIGRAM(S): 30 INJECTION SUBCUTANEOUS at 06:20

## 2024-01-01 RX ADMIN — ENOXAPARIN SODIUM 30 MILLIGRAM(S): 30 INJECTION SUBCUTANEOUS at 05:25

## 2024-01-01 RX ADMIN — Medication 50 MILLILITER(S): at 18:59

## 2024-01-01 RX ADMIN — FLUTICASONE PROPIONATE AND SALMETEROL XINAFOATE 1 DOSE(S): 45; 21 AEROSOL, METERED RESPIRATORY (INHALATION) at 21:26

## 2024-01-01 RX ADMIN — PANTOPRAZOLE SODIUM 40 MILLIGRAM(S): 40 TABLET, DELAYED RELEASE ORAL at 14:32

## 2024-01-01 RX ADMIN — CEFTOLOZANE AND TAZOBACTAM 100 MILLIGRAM(S): 1; .5 INJECTION, POWDER, LYOPHILIZED, FOR SOLUTION INTRAVENOUS at 06:20

## 2024-01-01 RX ADMIN — CHLORHEXIDINE GLUCONATE 15 MILLILITER(S): 1.2 RINSE ORAL at 05:31

## 2024-01-01 RX ADMIN — POTASSIUM PHOSPHATE, MONOBASIC POTASSIUM PHOSPHATE, DIBASIC INJECTION, 62.5 MILLIMOLE(S): 236; 224 SOLUTION, CONCENTRATE INTRAVENOUS at 05:38

## 2024-01-01 RX ADMIN — FLUTICASONE PROPIONATE AND SALMETEROL XINAFOATE 1 DOSE(S): 45; 21 AEROSOL, METERED RESPIRATORY (INHALATION) at 12:14

## 2024-01-01 RX ADMIN — POTASSIUM CHLORIDE 100 MILLIEQUIVALENT(S): 600 TABLET, EXTENDED RELEASE ORAL at 18:35

## 2024-01-01 RX ADMIN — ACETAMINOPHEN, DIPHENHYDRAMINE HCL, PHENYLEPHRINE HCL 5 MILLIGRAM(S): 325; 25; 5 TABLET ORAL at 20:14

## 2024-01-01 RX ADMIN — ROCURONIUM BROMIDE 100 MILLIGRAM(S): 10 INJECTION INTRAVENOUS at 21:10

## 2024-01-01 RX ADMIN — PANTOPRAZOLE SODIUM 40 MILLIGRAM(S): 40 TABLET, DELAYED RELEASE ORAL at 12:08

## 2024-01-01 RX ADMIN — PROPOFOL 3.22 MICROGRAM(S)/KG/MIN: 10 INJECTION, EMULSION INTRAVENOUS at 04:40

## 2024-01-01 RX ADMIN — ETOMIDATE 20 MILLIGRAM(S): 2 INJECTION, SOLUTION INTRAVENOUS at 20:09

## 2024-01-01 RX ADMIN — CEFTOLOZANE AND TAZOBACTAM 33.33 MILLIGRAM(S): 1; .5 INJECTION, POWDER, LYOPHILIZED, FOR SOLUTION INTRAVENOUS at 06:13

## 2024-01-01 RX ADMIN — SODIUM CHLORIDE 2000 MILLILITER(S): 9 INJECTION, SOLUTION INTRAMUSCULAR; INTRAVENOUS; SUBCUTANEOUS at 17:05

## 2024-01-01 RX ADMIN — FUROSEMIDE 40 MILLIGRAM(S): 40 TABLET ORAL at 05:21

## 2024-01-01 RX ADMIN — ACETAMINOPHEN 500MG 1000 MILLIGRAM(S): 500 TABLET, COATED ORAL at 21:47

## 2024-01-01 RX ADMIN — CHLORHEXIDINE GLUCONATE 1 APPLICATION(S): 1.2 RINSE ORAL at 06:13

## 2024-01-01 RX ADMIN — FLUTICASONE PROPIONATE AND SALMETEROL XINAFOATE 1 DOSE(S): 45; 21 AEROSOL, METERED RESPIRATORY (INHALATION) at 11:09

## 2024-01-01 RX ADMIN — CHLORHEXIDINE GLUCONATE 15 MILLILITER(S): 1.2 RINSE ORAL at 18:02

## 2024-01-01 RX ADMIN — Medication 150 GRAM(S): at 17:34

## 2024-01-01 RX ADMIN — ATROPINE 1 MILLIGRAM(S): 2 INJECTION INTRAMUSCULAR at 19:26

## 2024-01-01 RX ADMIN — ACETAMINOPHEN 500MG 400 MILLIGRAM(S): 500 TABLET, COATED ORAL at 21:04

## 2024-01-01 RX ADMIN — POTASSIUM CHLORIDE 100 MILLIEQUIVALENT(S): 600 TABLET, EXTENDED RELEASE ORAL at 15:50

## 2024-01-01 RX ADMIN — POTASSIUM CHLORIDE 100 MILLIEQUIVALENT(S): 600 TABLET, EXTENDED RELEASE ORAL at 17:46

## 2024-01-01 RX ADMIN — Medication 500 MILLILITER(S): at 22:47

## 2024-01-01 RX ADMIN — Medication 2 PUFF(S): at 18:26

## 2024-01-01 RX ADMIN — POTASSIUM CHLORIDE 100 MILLIEQUIVALENT(S): 600 TABLET, EXTENDED RELEASE ORAL at 11:18

## 2024-01-01 RX ADMIN — CHLORHEXIDINE GLUCONATE 1 APPLICATION(S): 1.2 RINSE ORAL at 06:25

## 2024-01-01 RX ADMIN — POTASSIUM CHLORIDE 100 MILLIEQUIVALENT(S): 600 TABLET, EXTENDED RELEASE ORAL at 06:02

## 2024-01-01 RX ADMIN — PANTOPRAZOLE SODIUM 40 MILLIGRAM(S): 40 TABLET, DELAYED RELEASE ORAL at 11:13

## 2024-01-01 RX ADMIN — POTASSIUM CHLORIDE 100 MILLIEQUIVALENT(S): 600 TABLET, EXTENDED RELEASE ORAL at 05:38

## 2024-01-01 RX ADMIN — FUROSEMIDE 40 MILLIGRAM(S): 40 TABLET ORAL at 06:23

## 2024-01-01 RX ADMIN — CHLORHEXIDINE GLUCONATE 1 APPLICATION(S): 1.2 RINSE ORAL at 05:31

## 2024-01-01 RX ADMIN — FUROSEMIDE 40 MILLIGRAM(S): 40 TABLET ORAL at 05:29

## 2024-01-01 RX ADMIN — Medication 10 MILLIGRAM(S): at 19:27

## 2024-01-01 RX ADMIN — Medication 2.5 MILLIGRAM(S): at 19:39

## 2024-01-01 RX ADMIN — PROPOFOL 3.22 MICROGRAM(S)/KG/MIN: 10 INJECTION, EMULSION INTRAVENOUS at 12:59

## 2024-01-01 RX ADMIN — Medication 125 MILLIGRAM(S): at 17:04

## 2024-01-01 RX ADMIN — POTASSIUM CHLORIDE 100 MILLIEQUIVALENT(S): 600 TABLET, EXTENDED RELEASE ORAL at 12:06

## 2024-01-01 RX ADMIN — FUROSEMIDE 40 MILLIGRAM(S): 40 TABLET ORAL at 05:31

## 2024-01-01 RX ADMIN — ACETAMINOPHEN, DIPHENHYDRAMINE HCL, PHENYLEPHRINE HCL 5 MILLIGRAM(S): 325; 25; 5 TABLET ORAL at 22:04

## 2024-01-01 RX ADMIN — Medication 100 MILLIGRAM(S): at 17:06

## 2024-01-01 RX ADMIN — ENOXAPARIN SODIUM 30 MILLIGRAM(S): 30 INJECTION SUBCUTANEOUS at 05:30

## 2024-01-01 RX ADMIN — SODIUM ZIRCONIUM CYCLOSILICATE 5 GRAM(S): 5 POWDER, FOR SUSPENSION ORAL at 18:57

## 2024-01-01 RX ADMIN — CHLORHEXIDINE GLUCONATE 1 APPLICATION(S): 1.2 RINSE ORAL at 05:17

## 2024-01-01 RX ADMIN — PANTOPRAZOLE SODIUM 40 MILLIGRAM(S): 40 TABLET, DELAYED RELEASE ORAL at 12:59

## 2024-01-01 RX ADMIN — POLYETHYLENE GLYCOL 3350 17 GRAM(S): 17 POWDER, FOR SOLUTION ORAL at 11:14

## 2024-01-01 RX ADMIN — Medication 5 UNIT(S): at 18:59

## 2024-01-01 RX ADMIN — CEFTOLOZANE AND TAZOBACTAM 33.33 MILLIGRAM(S): 1; .5 INJECTION, POWDER, LYOPHILIZED, FOR SOLUTION INTRAVENOUS at 22:19

## 2024-01-01 RX ADMIN — POTASSIUM CHLORIDE 100 MILLIEQUIVALENT(S): 600 TABLET, EXTENDED RELEASE ORAL at 10:13

## 2024-01-01 RX ADMIN — CHLORHEXIDINE GLUCONATE 1 APPLICATION(S): 1.2 RINSE ORAL at 06:21

## 2024-01-01 RX ADMIN — POTASSIUM CHLORIDE 100 MILLIEQUIVALENT(S): 600 TABLET, EXTENDED RELEASE ORAL at 09:23

## 2024-01-01 RX ADMIN — FUROSEMIDE 40 MILLIGRAM(S): 40 TABLET ORAL at 14:26

## 2024-01-01 RX ADMIN — FLUTICASONE PROPIONATE AND SALMETEROL XINAFOATE 1 DOSE(S): 45; 21 AEROSOL, METERED RESPIRATORY (INHALATION) at 00:11

## 2024-01-01 RX ADMIN — ACETAMINOPHEN, DIPHENHYDRAMINE HCL, PHENYLEPHRINE HCL 5 MILLIGRAM(S): 325; 25; 5 TABLET ORAL at 22:51

## 2024-01-01 RX ADMIN — CEFTOLOZANE AND TAZOBACTAM 100 MILLIGRAM(S): 1; .5 INJECTION, POWDER, LYOPHILIZED, FOR SOLUTION INTRAVENOUS at 14:24

## 2024-01-01 RX ADMIN — PROPOFOL 3.22 MICROGRAM(S)/KG/MIN: 10 INJECTION, EMULSION INTRAVENOUS at 06:57

## 2024-01-01 RX ADMIN — CEFTOLOZANE AND TAZOBACTAM 33.33 MILLIGRAM(S): 1; .5 INJECTION, POWDER, LYOPHILIZED, FOR SOLUTION INTRAVENOUS at 05:54

## 2024-01-01 RX ADMIN — CEFTOLOZANE AND TAZOBACTAM 100 MILLIGRAM(S): 1; .5 INJECTION, POWDER, LYOPHILIZED, FOR SOLUTION INTRAVENOUS at 21:50

## 2024-01-01 RX ADMIN — POTASSIUM CHLORIDE 100 MILLIEQUIVALENT(S): 600 TABLET, EXTENDED RELEASE ORAL at 12:15

## 2024-01-01 RX ADMIN — ENOXAPARIN SODIUM 30 MILLIGRAM(S): 30 INJECTION SUBCUTANEOUS at 06:13

## 2024-01-01 RX ADMIN — DOBUTAMINE 4.02 MICROGRAM(S)/KG/MIN: 12.5 INJECTION, SOLUTION, CONCENTRATE INTRAVENOUS at 13:01

## 2024-01-01 RX ADMIN — POLYETHYLENE GLYCOL 3350 17 GRAM(S): 17 POWDER, FOR SOLUTION ORAL at 12:08

## 2024-01-01 RX ADMIN — FUROSEMIDE 40 MILLIGRAM(S): 40 TABLET ORAL at 05:15

## 2024-01-01 RX ADMIN — ACETAMINOPHEN, DIPHENHYDRAMINE HCL, PHENYLEPHRINE HCL 5 MILLIGRAM(S): 325; 25; 5 TABLET ORAL at 21:27

## 2024-01-01 RX ADMIN — CEFTOLOZANE AND TAZOBACTAM 100 MILLIGRAM(S): 1; .5 INJECTION, POWDER, LYOPHILIZED, FOR SOLUTION INTRAVENOUS at 06:13

## 2024-01-01 RX ADMIN — POTASSIUM CHLORIDE 100 MILLIEQUIVALENT(S): 600 TABLET, EXTENDED RELEASE ORAL at 08:06

## 2024-01-01 RX ADMIN — ENOXAPARIN SODIUM 30 MILLIGRAM(S): 30 INJECTION SUBCUTANEOUS at 06:23

## 2024-01-01 RX ADMIN — CEFTOLOZANE AND TAZOBACTAM 33.33 MILLIGRAM(S): 1; .5 INJECTION, POWDER, LYOPHILIZED, FOR SOLUTION INTRAVENOUS at 22:04

## 2024-01-01 RX ADMIN — CEFTOLOZANE AND TAZOBACTAM 33.33 MILLIGRAM(S): 1; .5 INJECTION, POWDER, LYOPHILIZED, FOR SOLUTION INTRAVENOUS at 14:17

## 2024-01-01 RX ADMIN — POTASSIUM CHLORIDE 100 MILLIEQUIVALENT(S): 600 TABLET, EXTENDED RELEASE ORAL at 13:19

## 2024-08-07 ENCOUNTER — APPOINTMENT (OUTPATIENT)
Age: 89
End: 2024-08-07

## 2024-11-18 NOTE — ED ADULT TRIAGE NOTE - CHIEF COMPLAINT QUOTE
as per daughter c/o difficulty breathing x 2 days. Daughter reports patient was recently diagnosed with COPD. Unable to obtain oxygen saturation and oral temp. MD Ramirez at bedside and aware as per daughter c/o difficulty breathing x 2 days. Daughter reports patient was recently diagnosed with COPD. Unable to obtain oxygen saturation and oral temp in triage. MD Ramirez at bedside and aware

## 2024-11-18 NOTE — ED PROVIDER NOTE - CLINICAL SUMMARY MEDICAL DECISION MAKING FREE TEXT BOX
Patient is a 89 yo male with PMHx COPD, former smoker, prostate CA now in remission presenting with SOB, AMS. As per patient daughter at bedside, patient has had worsening SOB and confusion for 2 days. Patient recently completed abx for a PNA however has not been hospitalized. Patient aaox2, confused, but following commands and moving extremities equally. Patient tachypneic, unable to obtain O2 initially, abg obtained. Placed on Bipap for work of breathing. Hypotensive, hypothermic.  -Will hydrate, give ceftriaxone and azithromycin for likely pneumonia, warm patient, placed on BiPAP for work of breathing and respiratory failure, check labs and rule out electrolyte abnormalities, ABG to evaluate for respiratory verse metabolic acidosis,  D-dimer to rule out pulmonary embolism, blood cultures to rule out bacteremia, lactate to evaluate for end organ dysfunction, troponin EKG to assess for ACS versus arrhythmia, BNP chest x-ray to eval for heart failure versus pneumonia, give nebulizers and steroids for underlying COPD exacerbation, reassess.

## 2024-11-18 NOTE — H&P ADULT - NSHPPHYSICALEXAM_GEN_ALL_CORE
PHYSICAL EXAM:  GENERAL: NAD, intubated, sedated, frail appearing.   HEAD:  Atraumatic, Normocephalic  EYES: b/l reactive pupils, conjunctiva and sclera clear  NECK: Supple  CHEST/LUNG: mild bibasilar crackles bilaterally; No wheeze; No crackles; No accessory muscles used  HEART: Regular rate and rhythm; No murmurs;   ABDOMEN: Soft, Nontender, Nondistended; Bowel sounds present; No guarding  EXTREMITIES:  2+ Peripheral Pulses, 2+ b/l pitting edema.   PSYCH: AAOx0  NEUROLOGY: non-focal  SKIN: No rashes or lesions

## 2024-11-18 NOTE — ED PROVIDER NOTE - PHYSICAL EXAMINATION
Gen: no acute distress  Head: normocephalic, atraumatic  Lung: bilateral rhonchi, s  CV: normal s1/s2, rrr,   Abd: soft, non-tender, non-distended  MSK: No edema, no visible deformities, full range of motion in all 4 extremities  Neuro: No focal neurologic deficits  Skin: No rash   Psych: normal affect Gen: In moderate distress  Head: normocephalic, atraumatic  Lung: bilateral wheezes, tachypneic, satting 80% on RA, placed on NRB, in moderate distres   CV: normal s1/s2, rrr,   Abd: soft, mildly ttp diffusely, non-distended, no peritoneal signs   MSK: bilateral upper and lower extremity pitting edema. full range of motion in all 4 extremities  Neuro: Moving extremities equally, PERRLA, EOMI

## 2024-11-18 NOTE — ED PROVIDER NOTE - CARE PLAN
1 Principal Discharge DX:	Acute respiratory failure with hypoxia   Principal Discharge DX:	Acute respiratory failure with hypoxia  Secondary Diagnosis:	Acute heart failure

## 2024-11-18 NOTE — ED PROVIDER NOTE - OBJECTIVE STATEMENT
Patient is a 89 yo male with PMHx COPD, forme smoker, prostate CA now in remission presenting with SOB, AMS. As per patient daughter at bedside, patient has had worsening SOB and confusion for 2 days. Patient recently completed abx for a PNA however has not been hospitalized. Patient aaox2, confused, but following commands and moving extremities equally. Patient tachypneic, unable to obtain O2 initially, abg obtained. Placed on Bipap for work of breathing. Daughter and wife denies falls, LOC, blood thinners. Patient is full code, family requesting intubation and CPR as well as hospitalization and IV fluids if needed. Patient is a 89 yo male with PMHx COPD, former smoker, prostate CA now in remission presenting with SOB, AMS. As per patient daughter at bedside, patient has had worsening SOB and confusion for 2 days. Patient recently completed abx for a PNA however has not been hospitalized. Patient aaox2, confused, but following commands and moving extremities equally. Patient tachypneic, unable to obtain O2 initially, abg obtained. Placed on Bipap for work of breathing. Daughter and wife denies falls, LOC, blood thinners. Patient is full code, family requesting intubation and CPR as well as hospitalization and IV fluids if needed.

## 2024-11-18 NOTE — ED ADULT NURSE NOTE - CHIEF COMPLAINT QUOTE
as per daughter c/o difficulty breathing x 2 days. Daughter reports patient was recently diagnosed with COPD. Unable to obtain oxygen saturation and oral temp in triage. MD Ramirez at bedside and aware

## 2024-11-18 NOTE — H&P ADULT - ATTENDING COMMENTS
Patient seen and examined with ICU resident upon consultation request at 10PM. Data reviewed. Case and plan of care discussed with resident and agree with note.  This is 89M  with h/o COPD, prostate cancer s/p RT was said to have presented to the ED with worsening SOB associated with leg swelling and in the ED he was placed on bipap and thereafter intubated and placed on mechanical ventilation.     Vital signs reviewed. BP stable, HR 90's/min, RR 18 on AC/18/400/+5/60%, elderly man orally intubated, heart- normal heart sounds, Lungs- bilateral air entry, Abdomen- flat, soft, Ext- bilateral pitting pedal edema    Labs/Imagings reviewed. Hb 12, INR 2.51, D-dimer 315, lactate 5.3, Na 126, HCO3 of 16. CXR showed mild interstitial infiltrates with bilateral pleffs.  CT chest/abdomen showed no PE, mild bilateral GGOs, LLL nodule, cholethiasis   POCUS at bedside showed dilated LV with severely decreased LV systolic contractility, no RV strain, no pericardial effusion, IVC plethoric, bilateral pleural effusions.    Assessment  Acute respiratory failure from likely decompensated heart failure s/p intubation  Bilateral pleural effusions from heart failure  Lactic acidosis likely Type A  Hyponatremia  GB stones with wall thickening R/o infection  LLL nodule  H/o COPD  H/o prostate cancer s/p RT    Plan  Accepted to ICU  Continue mechanical ventilation  Monitor ABG and adjust vent settings as needed.  Trend troponins, 12 lead EKG  IV lasix 40mg q12hr  IV Ceftriaxone to cover GB infection pending RUQ sonogram  Trend lactate to target <2.  Urine electrolytes, osmolality  Monitor serum sodium  OG tube for feeding   DVT/GI prophylaxis.    Condition: Critical;, Prognosis: Guarded.

## 2024-11-18 NOTE — H&P ADULT - ASSESSMENT
Patient is a 89 y/o M with PMH COPD, and prostate cancer (in remission s/p radiation) who presented with 2 day history of worsening shortness of breath, and confusion. As per daughter patient had been complaining of worsening shortness of breath and difficulty breathing for 2 days associated with LE swelling, and orthopnea. In the ED patient was given ceftriaxone, azithromycin, and given 3L IV fluid. Bedside POCUS revealed b/l B-lines, significant dilated cardiomyopathy and poor contractility. Patient is admitted for AHRF likely 2/2 to acute systolic HF.     #AHRF  #New onset acute systolic HF.   # Patient is a 89 y/o M with PMH COPD, and prostate cancer (in remission s/p radiation) who presented with 2 day history of worsening shortness of breath, and confusion. As per daughter patient had been complaining of worsening shortness of breath and difficulty breathing for 2 days associated with LE swelling, and orthopnea. In the ED patient was given ceftriaxone, azithromycin, and given 3L IV fluid. Bedside POCUS revealed b/l B-lines, significant dilated cardiomyopathy and poor contractility. Patient is admitted for AHRF likely 2/2 to acute systolic HF.     #AHRF  #New onset acute systolic HF.   #COPD  #Cholelithiasis  #Transaminitis  #Hyperkalemia  #Hypoantremia    Plan:     NEURO:  #Intubated and sedated  - C/w propofol  - Fentanyl pushes as needed.   - SAT and SBT daily.     CARDIO:  #New onset acute systolic HF.   - Family reports no known history of HF.   - Pro-bnp - 10k.   - Trop 60.6.   - EKG -   - P/w shortness of breath, hypoxia, LE swelling.   - POCUS at bedside revealed B-lines, significantly dilated cardiomyopathy, with poor systolic function.   - Start lasix IV 40 BID.   - Daily weights.   - Strict Is and Os.   - F/U A1C and lipid profile.   - F/U TTE.   - Consult cardio in AM    PULM:  #AHRF 2/2 to Acute CHF vs PNA.   - P/w shortness of breath, hypoxia, LE swelling.   - CTA chest: No PE, Cardiomegaly and Bilateral pleural effusions, left greater than right. Suggestive of cardiogenic failure. Reflux of contrast into the hepatic veins and intrahepatic IVC may suggest right-sided heart dysfunction. Focal bronchiectasis versus thick-walled cyst of the left lower lobe and focal bronchiectasis with associated wall thickening of the right lower lobe. This may be infectious or inflammatory in nature.   - C/w ceftriaxone and azithromycin.  - C/w lasix 40mg IV BID.    - F/U sputum Cx.   - F/u Bcx  - F/U Legionella, strep, mycoplasma, MRSA  - C/w vent 450/5/18/50.       GI:  #Cholelithiasis   - No abdominal complains reported.   -  CT a./p: Cholelithiasis. Gallbladder wall thickening versus pericholecystic fluid is nonspecific in the setting of generalized edema.  - C/w to monitor for signs of sepsis.     #Transaminitis.   - P/w ALP - 182, ALP - 151, AST - 200.   - Likely 2/2 to hepatic congestion.   - C/w to monitor.   - CT a/p - Heterogeneous hepatic enhancement which may be related to congestion or underlying hepatic dysfunction. Correlate clinically.    RENAL:  #Hyponatremia  - Likely due to hypervolemic hyponatremia.   - C/w lasix.   - Do not ovecorrect > 8  - F/U BMP     #Hyperkalemia  - Resolved.     INFECTIOUS:  #PNA  - See above.   -     HEMO:  No issues      ENDO:  No issues    -    MSK/SKIN:  No issues    -    PPX:  - Lovenox  - PPI        Dispo:  ICU  Full code.  Patient is a 89 y/o M with PMH COPD, and prostate cancer (in remission s/p radiation) who presented with 2 day history of worsening shortness of breath, and confusion. As per daughter patient had been complaining of worsening shortness of breath and difficulty breathing for 2 days associated with LE swelling, and orthopnea. In the ED patient was given ceftriaxone, azithromycin, and given 3L IV fluid. Bedside POCUS revealed b/l B-lines, significant dilated cardiomyopathy and poor contractility. Patient is admitted for AHRF likely 2/2 to acute systolic HF.     #AHRF  #New onset acute systolic HF.   #COPD  #Cholelithiasis  #Transaminitis  #Hyperkalemia  #Hypoantremia    Plan:     NEURO:  #Intubated and sedated  - C/w propofol  - Fentanyl pushes as needed.   - SAT and SBT daily.     CARDIO:  #New onset acute systolic HF.   - Family reports no known history of HF.   - Pro-bnp - 10k.   - Trop 60.6.   - EKG - NSR w/ left bundle branch block and PVCs.   - P/w shortness of breath, hypoxia, LE swelling.   - POCUS at bedside revealed B-lines, significantly dilated cardiomyopathy, with poor systolic function.   - Start lasix IV 40 BID.   - Daily weights.   - Strict Is and Os.   - F/U A1C and lipid profile.   - F/U TTE.   - Consult cardio in AM    PULM:  #AHRF 2/2 to Acute CHF vs PNA.   - P/w shortness of breath, hypoxia, LE swelling.   - CTA chest: No PE, Cardiomegaly and Bilateral pleural effusions, left greater than right. Suggestive of cardiogenic failure. Reflux of contrast into the hepatic veins and intrahepatic IVC may suggest right-sided heart dysfunction. Focal bronchiectasis versus thick-walled cyst of the left lower lobe and focal bronchiectasis with associated wall thickening of the right lower lobe. This may be infectious or inflammatory in nature.   - C/w ceftriaxone and azithromycin.  - C/w lasix 40mg IV BID.    - F/U sputum Cx.   - F/u Bcx  - F/U Legionella, strep, mycoplasma, MRSA  - C/w vent 450/5/18/50.       GI:  #Cholelithiasis   - No abdominal complains reported.   -  CT a./p: Cholelithiasis. Gallbladder wall thickening versus pericholecystic fluid is nonspecific in the setting of generalized edema.  - C/w to monitor for signs of sepsis.     #Transaminitis.   - P/w ALP - 182, ALP - 151, AST - 200.   - Likely 2/2 to hepatic congestion.   - C/w to monitor.   - CT a/p - Heterogeneous hepatic enhancement which may be related to congestion or underlying hepatic dysfunction. Correlate clinically.    RENAL:  #Hyponatremia  - Likely due to hypervolemic hyponatremia.   - C/w lasix.   - Do not ovecorrect > 8  - F/U BMP     #Hyperkalemia  - Resolved.     INFECTIOUS:  #PNA  - See above.   -     HEMO:  No issues      ENDO:  No issues    -    MSK/SKIN:  No issues    -    PPX:  - Lovenox  - PPI        Dispo:  ICU  Full code.  Patient is a 89 y/o M with PMH COPD, and prostate cancer (in remission s/p radiation) who presented with 2 day history of worsening shortness of breath, and confusion. As per daughter patient had been complaining of worsening shortness of breath and difficulty breathing for 2 days associated with LE swelling, and orthopnea. In the ED patient was given ceftriaxone, azithromycin, and given 3L IV fluid. Bedside POCUS revealed b/l B-lines, significant dilated cardiomyopathy and poor contractility. Patient is admitted for AHRF likely 2/2 to acute systolic HF.     #AHRF  #New onset acute systolic HF.   #elevated lactate  #COPD  #Cholelithiasis  #Transaminitis  #Hyperkalemia  #Hypoantremia    Plan:     NEURO:  #Intubated and sedated  - C/w propofol  - Fentanyl pushes as needed.   - SAT and SBT daily.     CARDIO:  #New onset acute systolic HF.   - Family reports no known history of HF.   - Pro-bnp - 10k.   - Trop 60.6.   - EKG - NSR w/ left bundle branch block and PVCs.   - P/w shortness of breath, hypoxia, LE swelling.   - POCUS at bedside revealed B-lines, significantly dilated cardiomyopathy, with poor systolic function.   - Start lasix IV 40 BID.   - Daily weights.   - Strict Is and Os.   - F/U A1C and lipid profile.   - F/U TTE.   - Consult cardio in AM    PULM:  #AHRF 2/2 to Acute CHF vs PNA.   - P/w shortness of breath, hypoxia, LE swelling.   - CTA chest: No PE, Cardiomegaly and Bilateral pleural effusions, left greater than right. Suggestive of cardiogenic failure. Reflux of contrast into the hepatic veins and intrahepatic IVC may suggest right-sided heart dysfunction. Focal bronchiectasis versus thick-walled cyst of the left lower lobe and focal bronchiectasis with associated wall thickening of the right lower lobe. This may be infectious or inflammatory in nature.   - C/w ceftriaxone and azithromycin.  - C/w lasix 40mg IV BID.    - F/U sputum Cx.   - F/u Bcx  - F/U Legionella, strep, mycoplasma, MRSA  - C/w vent 450/5/18/50.       GI:  #Cholelithiasis   - No abdominal complains reported.   -  CT a./p: Cholelithiasis. Gallbladder wall thickening versus pericholecystic fluid is nonspecific in the setting of generalized edema.  - C/w to monitor for signs of sepsis.     #Transaminitis.   - P/w ALP - 182, ALP - 151, AST - 200.   - Likely 2/2 to hepatic congestion.   - C/w to monitor.   - CT a/p - Heterogeneous hepatic enhancement which may be related to congestion or underlying hepatic dysfunction. Correlate clinically.    RENAL:  #Hyponatremia  - Likely due to hypervolemic hyponatremia.   - C/w lasix.   - Do not ovecorrect > 8  - F/U BMP     #Hyperkalemia  - Resolved.     #elevated lactate  - P/w lactate 5.3>9.1>6.4.  - Likely due to hypotensive and poor cardiac perfusion.   - C/w to monitor.     INFECTIOUS:  #PNA  - See above.   -     HEMO:  No issues      ENDO:  No issues    -    MSK/SKIN:  No issues    -    PPX:  - Lovenox  - PPI        Dispo:  ICU  Full code.  Patient is a 89 y/o M with PMH COPD, and prostate cancer (in remission s/p radiation) who presented with 2 day history of worsening shortness of breath, and confusion. As per daughter patient had been complaining of worsening shortness of breath and difficulty breathing for 2 days associated with LE swelling, and orthopnea. In the ED patient was given ceftriaxone, azithromycin, and given 3L IV fluid. Bedside POCUS revealed b/l B-lines, significant dilated cardiomyopathy and poor contractility. Patient is admitted for AHRF likely 2/2 to acute systolic HF.     #AHRF  #New onset acute systolic HF.   #elevated lactate  #COPD  #Cholelithiasis  #Transaminitis  #Hyperkalemia  #Hypoantremia    Plan:     NEURO:  #Intubated and sedated  - C/w propofol  - Fentanyl pushes as needed.   - SAT and SBT daily.     CARDIO:  #New onset acute systolic HF.   - Family reports no known history of HF.   - Pro-bnp - 10k.   - Trop 60.6.   - EKG - NSR w/ left bundle branch block and PVCs.   - P/w shortness of breath, hypoxia, LE swelling.   - POCUS at bedside revealed B-lines, significantly dilated cardiomyopathy, with poor systolic function.   - Start lasix IV 40 BID.   - Daily weights.   - Strict Is and Os.   - F/U A1C and lipid profile.   - F/U TTE.   - Consult cardio in AM    PULM:  #AHRF 2/2 to Acute CHF vs PNA.   - P/w shortness of breath, hypoxia, LE swelling.   - CTA chest: No PE, Cardiomegaly and Bilateral pleural effusions, left greater than right. Suggestive of cardiogenic failure. Reflux of contrast into the hepatic veins and intrahepatic IVC may suggest right-sided heart dysfunction. Focal bronchiectasis versus thick-walled cyst of the left lower lobe and focal bronchiectasis with associated wall thickening of the right lower lobe. This may be infectious or inflammatory in nature.   - C/w ceftriaxone and azithromycin.  - C/w lasix 40mg IV BID.    - F/U sputum Cx.   - F/u Bcx  - F/U Legionella, strep, mycoplasma, MRSA  - C/w vent 450/5/18/50.     #COPD  - C/w duoneb, symbicort, and spiriva.   - Low suspicion of COPD exacerbation.     GI:  #Cholelithiasis   - No abdominal complains reported.   -  CT a./p: Cholelithiasis. Gallbladder wall thickening versus pericholecystic fluid is nonspecific in the setting of generalized edema.  - C/w to monitor for signs of sepsis.     #Transaminitis.   - P/w ALP - 182, ALP - 151, AST - 200.   - Likely 2/2 to hepatic congestion.   - C/w to monitor.   - CT a/p - Heterogeneous hepatic enhancement which may be related to congestion or underlying hepatic dysfunction. Correlate clinically.    RENAL:  #Hyponatremia  - Likely due to hypervolemic hyponatremia.   - C/w lasix.   - Do not ovecorrect > 8  - F/U BMP     #Hyperkalemia  - Resolved.     #elevated lactate  - P/w lactate 5.3>9.1>6.4.  - Likely due to hypotensive and poor cardiac perfusion.   - C/w to monitor.     INFECTIOUS:  #PNA  - See above.   -     HEMO:  No issues      ENDO:  No issues    -    MSK/SKIN:  No issues    -    PPX:  - Lovenox  - PPI        Dispo:  ICU  Full code.

## 2024-11-18 NOTE — H&P ADULT - HISTORY OF PRESENT ILLNESS
Patient is a 89 y/o M with PMH COPD, and prostate cancer (in remission s/p radiation) who presented with 2 day history of worsening shortness of breath, and confusion. Patient was intubated at time of evaluation and collateral history was provided by Buffy Salinas (Daughter - on call). As per daughter patient had been complaining of worsening shortness of breath and difficulty breathing for 2 days. She denies any associated cough, phlegm, or fevers. She reports patient complained of worsening shortness of breath while lying down, and she did notice worsening LE swelling in the past few weeks. She reports patient was diagnosed with left sided pneumonia 3 weeks ago and completed course of Augmentin outpatient after which his symptoms resolved. As per the daughter patient has no history of cardiac disease, and has no history of stent placement, or MI. She reports patient is non-compliant with symbicort and albuterol inhaler, but is not on any other medication. She denies any other symptoms that patient complained about recently.   ED course: Patient was given ceftriaxone, azithromycin, and given 3L IV fluid. Paitent was also placed on BiPAP due to increased work of breathing. However, patient was not able to tolerate BiPAP and ripped it off. Patient was saturating 73% on room air at which time patient was intubated.

## 2024-11-18 NOTE — ED PROVIDER NOTE - PROGRESS NOTE DETAILS
JK -  Labs significant for elevated D-dimer, CT angio pending.  Labs also showing lactic acidosis, hydration given, repeat pending.  Patient found to be agitated, aggressive, ripping his BiPAP mask off.  IV medications given for patient safety however patient found to be hypoxic, hypotensive, bradycardic and showing severe agitation.  Intubated for airway protection.  Placed on propofol infusion, repeat EKG within normal limits.  Chest x-ray ordered.  Will obtain CT abdomen and pelvis given abnormal LFTs, reassess, consult ICU. JK - CT showing bilateral pleural effusions, likely cardiogenic in nature.  Given diffuse anasarca, elevated BNP patient likely in acute heart failure.  Given patient hypotensive will defer Lasix at this time.  CT also showing cholelithiasis however no tenderness to palpation along right upper quadrant initially.  Ceftriaxone given.  Will admit to ICU for further monitoring and evaluation and management.

## 2024-11-19 NOTE — CONSULT NOTE ADULT - PROBLEM SELECTOR RECOMMENDATION 3
FULL CODE  Unclear surrogate decision-makers  No ACP forms in chart Left message for daughter Buffy Rest at [phone] to discuss baseline resp function and ADLs.   Pressure sore on sacrum suggests that he has not been very ambulatory prior to admission.

## 2024-11-19 NOTE — DIETITIAN INITIAL EVALUATION ADULT - PERTINENT MEDS FT
MEDICATIONS  (STANDING):  albuterol/ipratropium for Nebulization 3 milliLiter(s) Nebulizer every 6 hours  azithromycin  IVPB 500 milliGRAM(s) IV Intermittent every 24 hours  cefTRIAXone   IVPB 1000 milliGRAM(s) IV Intermittent every 24 hours  chlorhexidine 0.12% Liquid 15 milliLiter(s) Oral Mucosa every 12 hours  chlorhexidine 2% Cloths 1 Application(s) Topical <User Schedule>  enoxaparin Injectable 30 milliGRAM(s) SubCutaneous every 24 hours  fluticasone propionate/ salmeterol 100-50 MICROgram(s) Diskus 1 Dose(s) Inhalation two times a day  furosemide   Injectable 40 milliGRAM(s) IV Push two times a day  pantoprazole   Suspension 40 milliGRAM(s) Oral daily  propofol Infusion 10 MICROgram(s)/kG/Min (3.22 mL/Hr) IV Continuous <Continuous>  tiotropium 2.5 MICROgram(s) Inhaler 2 Puff(s) Inhalation daily    MEDICATIONS  (PRN):

## 2024-11-19 NOTE — PROGRESS NOTE ADULT - ASSESSMENT
· Assessment	  Patient is a 89 y/o M with PMH COPD, and prostate cancer (in remission s/p radiation) who presented with 2 day history of worsening shortness of breath, and confusion. As per daughter patient had been complaining of worsening shortness of breath and difficulty breathing for 2 days associated with LE swelling, and orthopnea. In the ED patient was given ceftriaxone, azithromycin, and given 3L IV fluid. Bedside POCUS revealed b/l B-lines, significant dilated cardiomyopathy and poor contractility. Patient is admitted for AHRF likely 2/2 to acute systolic HF.     #AHRF  #New onset acute systolic HF.   #elevated lactate  #COPD  #Cholelithiasis  #Transaminitis  #Hyperkalemia  #Hypoantremia    Plan:     NEURO:  #Intubated and sedated  - C/w propofol  - Fentanyl pushes as needed.   - SAT and SBT daily.     CARDIO:  #New onset acute systolic HF.   - Family reports no known history of HF.   - Pro-bnp - 10k.   - Trop 60.6.   - EKG - NSR w/ left bundle branch block and PVCs.   - P/w shortness of breath, hypoxia, LE swelling.   - POCUS at bedside revealed B-lines, significantly dilated cardiomyopathy, with poor systolic function.   - Start lasix IV 40 BID.   - Daily weights.   - Strict Is and Os.   - F/U A1C and lipid profile.   - F/U TTE.   - Consult cardio in AM    PULM:  #AHRF 2/2 to Acute CHF vs PNA.   - P/w shortness of breath, hypoxia, LE swelling.   - CTA chest: No PE, Cardiomegaly and Bilateral pleural effusions, left greater than right. Suggestive of cardiogenic failure. Reflux of contrast into the hepatic veins and intrahepatic IVC may suggest right-sided heart dysfunction. Focal bronchiectasis versus thick-walled cyst of the left lower lobe and focal bronchiectasis with associated wall thickening of the right lower lobe. This may be infectious or inflammatory in nature.   - C/w ceftriaxone and azithromycin.  - C/w lasix 40mg IV BID.    - F/U sputum Cx.   - F/u Bcx  - F/U Legionella, strep, mycoplasma, MRSA  - C/w vent 450/5/18/50.     #COPD  - C/w duoneb, symbicort, and spiriva.   - Low suspicion of COPD exacerbation.     GI:  #Cholelithiasis   - No abdominal complains reported.   -  CT a./p: Cholelithiasis. Gallbladder wall thickening versus pericholecystic fluid is nonspecific in the setting of generalized edema.  - C/w to monitor for signs of sepsis.     #Transaminitis.   - P/w ALP - 182, ALP - 151, AST - 200.   - Likely 2/2 to hepatic congestion.   - C/w to monitor.   - CT a/p - Heterogeneous hepatic enhancement which may be related to congestion or underlying hepatic dysfunction. Correlate clinically.    RENAL:  #Hyponatremia  - Likely due to hypervolemic hyponatremia.   - C/w lasix.   - Do not ovecorrect > 8  - F/U BMP     #Hyperkalemia  - Resolved.     #elevated lactate  - P/w lactate 5.3>9.1>6.4.  - Likely due to hypotensive and poor cardiac perfusion.   - C/w to monitor.     INFECTIOUS:  #PNA  - See above.   -     HEMO:  No issues      ENDO:  No issues    -    MSK/SKIN:  No issues    -    PPX:  - Lovenox  - PPI        Dispo:  ICU  Full code.        Patient is a 89 y/o M with PMH COPD, and prostate cancer (in remission s/p radiation) who presented with 2 day history of worsening shortness of breath, and confusion. As per daughter patient had been complaining of worsening shortness of breath and difficulty breathing for 2 days associated with LE swelling, and orthopnea. In the ED patient was given ceftriaxone, azithromycin, and given 3L IV fluid. Bedside POCUS revealed b/l B-lines, significant dilated cardiomyopathy and poor contractility. Patient is admitted for AHRF likely 2/2 to acute systolic HF.     #AHRF  #New onset acute systolic HF.   #elevated lactate  #COPD  #Cholelithiasis  #Transaminitis  #Hypoantremia    Plan:     NEURO:  #analgesia  - Continue propofol gtt  - wean for RASS 0- -2  - SAT and SBT daily.     CARDIO:  #New onset acute systolic HF.   - P/w shortness of breath, hypoxia, LE swelling.    - Family reports no known history of HF.   - Pro-bnp 11/18 60782  - Trop 11/18 PEAKED AT 60.6.   - EKG - NSR w/ left bundle branch block and PVCs.   - POCUS at bedside revealed B-lines, significantly dilated cardiomyopathy, poor systolic function, and small L sided pleural effusion  - begin dobutamine 2.5mcg to aid in diuresis   - continue lasix IV 40 BID.   - obtain Daily weights.   - monitor Strict Is and Os.   - F/U A1C and lipid profile.   - F/U TTE.   - Cardiology consulted    PULM:  #AHRF 2/2 to Acute CHF vs PNA.   - P/w shortness of breath, hypoxia, LE swelling.   - CTA chest: No PE, Cardiomegaly and Bilateral pleural effusions, left greater than right. Suggestive of cardiogenic failure. Reflux of contrast into the hepatic veins and intrahepatic IVC may suggest right-sided heart dysfunction. Focal bronchiectasis versus thick-walled cyst of the left lower lobe and focal bronchiectasis with associated wall thickening of the right lower lobe. This may be infectious or inflammatory in nature.   - WBC 7.9, afebrile  - discontinue ceftriaxone and azithromycin, hypoxia likely related to new onset HF not infectious etiology  - C/w lasix 40mg IV BID.    - F/U sputum Cx.   - F/u Bcx  - F/U Legionella, strep, mycoplasma, MRSA  - C/w vent 450/5/14/40    #COPD  - on home symbicort 80 mcg-4.5mcg 2 puffs Q12 and albuterol 1.25mg q6  - continue symbicort 2 puff daily  - switch to duoneb PRN Q6 for SOB/wheezing    GI:  #Cholelithiasis   -  CT A/P 11/18 : Cholelithiasis. Gallbladder wall thickening versus pericholecystic fluid is nonspecific in the setting of generalized edema.  - obtain collateral when patient able  - monitor for s/s worsening    #Transaminitis Likely 2/2 to hepatic congestion.   - P/w ALP - 182, ALP - 151, AST - 200  - CT A/P 11/18: - Heterogeneous hepatic enhancement which may be related to congestion or underlying hepatic dysfunction. Correlate clinically.  - Trend LFTs daily    #nutrition  - NPO  - NGT placed  - will begin tube feeds once NGT confirmed by xray     RENAL:  #Hypervolemic hyponatremia 2/2 systolic heart failure  - Na 123 on admission  - s/p 1L NS and lasix 40 IV in ED  - continue furosemide 40mg IV BID  - Do not ovecorrect > 8 IN  24 hours  - F/U BMP @ 2PM      #elevated lactate 2/2 new onset acute systolic heart failure  - P/w lactate 5.3>9.1>6.4>4.9>3.5  - repeat lactate 2pm    INFECTIOUS:  #PNA  - See above.   -     HEMO:  #normocytic anemia  - Hgb 12.0 on admission, now 11.1  - likely in setting of critical illness  - monitor CBC daily  - transfuse for Hgb goal >7    #elevated INR  - INR 2.5 on admission  - possibly due to hepatic congestion 2/2 heart failure  - monitor for s/s bleeding  - repeat coags @ 2pm      ENDO:  #no active issues    -    MSK/SKIN:  #no active issues         PPX:  - DVT: lovenox  - GI: PPI         Dispo:  ICU  Full code.        Patient is a 89 y/o M with PMH COPD, and prostate cancer (in remission s/p radiation) who presented with 2 day history of worsening shortness of breath, and confusion. As per daughter patient had been complaining of worsening shortness of breath and difficulty breathing for 2 days associated with LE swelling, and orthopnea. In the ED patient was given ceftriaxone, azithromycin, and given 3L IV fluid. Bedside POCUS revealed b/l B-lines, significant dilated cardiomyopathy and poor contractility. Patient is admitted for AHRF likely 2/2 to acute systolic HF.     #AHRF  #New onset acute systolic HF.   #elevated lactate  #COPD  #Cholelithiasis  #Transaminitis  #Hypoantremia    Plan:     NEURO:  #sedation    - Continue sedation with propofol gtt (RASS -1 to -2)  - will defer SAT today given patient is being actively diuresed  - plan for SAT/SBT in AM    CARDIO:  #New onset acute systolic HF.   - P/w shortness of breath, hypoxia, LE swelling.    - Family reports no known history of HF, but shares 4 week history of LE edema   - Pro-BNP 11/18 10,828  - Trop 11/18 PEAKED AT 60.6.   - EKG - NSR w/ left bundle branch block and PVCs.   - POCUS at bedside revealed B-lines, significantly dilated cardiomyopathy, severely reduced LV systolic function, and small Left sided pleural effusion  - will continue diuresis with lasix IV 40mg BID   - will begin dobutamine 2.5mg to augment diuresis  - Daily weights and  Strict I&Os.   - F/U A1C and lipid profile.   - F/U TTE.   - Cardiology consulted, Panchito BOOGIE:  #AHRF 2/2 to Acute CHF vs PNA.   - P/w shortness of breath, hypoxia, LE swelling.   - requiring mechanical ventilation, vent adjusted per ABG: now 14/450/5/40%  - CTA chest: No PE, Cardiomegaly and Bilateral pleural effusions, left greater than right. Suggestive of cardiogenic failure. Reflux of contrast into the hepatic veins and intrahepatic IVC may suggest right-sided heart dysfunction. Focal bronchiectasis versus thick-walled cyst of the left lower lobe and focal bronchiectasis with associated wall thickening of the right lower lobe. This may be infectious or inflammatory in nature.   - WBC 7.9, afebrile, without evidence of infection,   - will discontinue ceftriaxone and azithromycin, hypoxia likely related to new onset HF    - F/U sputum Cx.   - F/u Bcx  - F/U Legionella, strep, mycoplasma, MRSA      #COPD  - on home symbicort 80 mcg-4.5mcg 2 puffs Q12 and albuterol 1.25mg q6  - continue symbicort 2 puff daily  - will switch to duoneb q6 PRN for SOB/wheezing    GI:  #Cholelithiasis   -  CT A/P 11/18 : Cholelithiasis. Gallbladder wall thickening versus pericholecystic fluid is nonspecific in the setting of generalized edema.  - patient with benign abdominal exam, no cholestatic pattern on LFTs, no evidence of infection,   - will obtain collateral when patient able      #Transaminitis Likely 2/2 to hepatic congestion in the setting of acute decompensated CHF  - P/w ALP - 182, ALP - 151, AST - 200  - CT A/P 11/18: - Heterogeneous hepatic enhancement which may be related to congestion or underlying hepatic dysfunction. Correlate clinically.  - Trend LFTs daily    #nutrition  - NPO  - NGT placed  - will begin tube feeds once NGT confirmed by xray     RENAL:  #Hypervolemic hyponatremia 2/2 systolic heart failure  - Na 123 on admission  - s/p 1L NS and lasix 40mg IV in ED  - continue furosemide 40mg IV BID  - Do not ovecorrect > 8mEq in  24 hours  - F/U BMP @ 2PM      #elevated lactate 2/2 new onset acute systolic heart failure  - P/w lactate 5.3>9.1>6.4>4.9>3.5  - repeat lactate 2pm    INFECTIOUS:  #PNA  - See above.   -     HEMO:  #normocytic anemia  - Hgb 12.0 on admission, now 11.1  - likely in setting of critical illness  - monitor CBC daily  - transfuse for Hgb goal >7    #elevated INR  - INR 2.5 on admission  - possibly due to hepatic congestion 2/2 heart failure  - monitor for s/s bleeding  - repeat coags @ 2pm      ENDO:  #no active issues    -    MSK/SKIN:  #no active issues         PPX:  - DVT: lovenox  - GI: PPI         Dispo:  ICU  Full code.

## 2024-11-19 NOTE — DIETITIAN INITIAL EVALUATION ADULT - FACTORS AFF FOOD INTAKE
Advanced age, acute on chronic comorbidities, s/p intubation, altered GI function of cholelithiasis, elevated LFT/change in mental status/difficulty swallowing/NPO

## 2024-11-19 NOTE — CONSULT NOTE ADULT - ASSESSMENT
89 M from home with SOB. Hx of COPD, prostate CA s/p radiation. Treated for sepsis in ED, intubated for resp distress after failing bipap.

## 2024-11-19 NOTE — PROGRESS NOTE ADULT - SUBJECTIVE AND OBJECTIVE BOX
INTERVAL HPI/OVERNIGHT EVENTS:       PRESSORS: [ ] YES [ ] NO  WHICH:    ANTIBIOTICS:                  DATE STARTED:  ANTIBIOTICS:                  DATE STARTED:    Antimicrobial:  azithromycin  IVPB 500 milliGRAM(s) IV Intermittent every 24 hours  cefTRIAXone   IVPB 1000 milliGRAM(s) IV Intermittent every 24 hours    Cardiovascular:  furosemide   Injectable 40 milliGRAM(s) IV Push two times a day    Pulmonary:  albuterol/ipratropium for Nebulization 3 milliLiter(s) Nebulizer every 6 hours  fluticasone propionate/ salmeterol 100-50 MICROgram(s) Diskus 1 Dose(s) Inhalation two times a day  tiotropium 2.5 MICROgram(s) Inhaler 2 Puff(s) Inhalation daily    Hematalogic:  enoxaparin Injectable 30 milliGRAM(s) SubCutaneous every 24 hours    Other:  chlorhexidine 0.12% Liquid 15 milliLiter(s) Oral Mucosa every 12 hours  chlorhexidine 2% Cloths 1 Application(s) Topical <User Schedule>  pantoprazole   Suspension 40 milliGRAM(s) Oral daily  propofol Infusion 10 MICROgram(s)/kG/Min IV Continuous <Continuous>    albuterol/ipratropium for Nebulization 3 milliLiter(s) Nebulizer every 6 hours  azithromycin  IVPB 500 milliGRAM(s) IV Intermittent every 24 hours  cefTRIAXone   IVPB 1000 milliGRAM(s) IV Intermittent every 24 hours  chlorhexidine 0.12% Liquid 15 milliLiter(s) Oral Mucosa every 12 hours  chlorhexidine 2% Cloths 1 Application(s) Topical <User Schedule>  enoxaparin Injectable 30 milliGRAM(s) SubCutaneous every 24 hours  fluticasone propionate/ salmeterol 100-50 MICROgram(s) Diskus 1 Dose(s) Inhalation two times a day  furosemide   Injectable 40 milliGRAM(s) IV Push two times a day  pantoprazole   Suspension 40 milliGRAM(s) Oral daily  propofol Infusion 10 MICROgram(s)/kG/Min IV Continuous <Continuous>  tiotropium 2.5 MICROgram(s) Inhaler 2 Puff(s) Inhalation daily    Drug Dosing Weight  Height (cm): 154.9 (18 Nov 2024 16:15)  Weight (kg): 53.6 (18 Nov 2024 23:43)  BMI (kg/m2): 22.3 (18 Nov 2024 23:43)  BSA (m2): 1.51 (18 Nov 2024 23:43)    PHYSICAL EXAM:  GENERAL: NAD  EYES: EOMI, PERRLA  NECK: Supple, No JVD; Trachea midline  NERVOUS SYSTEM:  Alert & Oriented X3,  moving all extremities with equal strength bilaterally, Motor Strength 5/5 B/L upper and lower extremities  CHEST/LUNG:  breath sounds present and [    ] bilaterally, No rales, rhonchi, wheezing  HEART: Regular rate and rhythm; No murmurs, rubs, or gallops  ABDOMEN: Soft, Nontender, Nondistended; Bowel sounds present, no pain or masses on palpation  : voiding well, Roth in place  EXTREMITIES:  2+ Peripheral Pulses, No clubbing, cyanosis, or edema  SKIN: warm, intact, no lesions, brisk capillary refill     LINES/DRAINS/DEVICES  CENTRAL LINE: [ ] YES [ ] NO  LOCATION:     ROTH: [ ] YES [ ] NO     A-LINE:  [ ] YES [ ] NO  LOCATION:       ICU Vital Signs Last 24 Hrs  T(C): 37.1 (19 Nov 2024 06:00), Max: 37.1 (19 Nov 2024 06:00)  T(F): 98.8 (19 Nov 2024 06:00), Max: 98.8 (19 Nov 2024 06:00)  HR: 69 (19 Nov 2024 06:00) (68 - 92)  BP: 110/64 (19 Nov 2024 06:00) (93/64 - 126/77)  BP(mean): 79 (19 Nov 2024 06:00) (71 - 106)  ABP: --  ABP(mean): --  RR: 14 (19 Nov 2024 06:00) (14 - 30)  SpO2: 100% (19 Nov 2024 06:00) (58% - 100%)    O2 Parameters below as of 19 Nov 2024 04:00  Patient On (Oxygen Delivery Method): ventilator    O2 Concentration (%): 40        ABG - ( 19 Nov 2024 03:27 )  pH, Arterial: 7.32  pH, Blood: x     /  pCO2: 32    /  pO2: 309   / HCO3: 16    / Base Excess: -8.5  /  SaO2: 100                   11-18 @ 07:01  -  11-19 @ 07:00  --------------------------------------------------------  IN: 32 mL / OUT: 640 mL / NET: -608 mL        Mode: AC/ CMV (Assist Control/ Continuous Mandatory Ventilation)  RR (machine): 18  TV (machine): 400  FiO2: 50  PEEP: 5  ITime: 0.9  MAP: 8  PIP: 17        LABS:  CBC Full  -  ( 19 Nov 2024 04:00 )  WBC Count : 7.90 K/uL  RBC Count : 4.08 M/uL  Hemoglobin : 11.1 g/dL  Hematocrit : 33.4 %  Platelet Count - Automated : 133 K/uL  Mean Cell Volume : 81.9 fl  Mean Cell Hemoglobin : 27.2 pg  Mean Cell Hemoglobin Concentration : 33.2 g/dL  Auto Neutrophil # : x  Auto Lymphocyte # : x  Auto Monocyte # : x  Auto Eosinophil # : x  Auto Basophil # : x  Auto Neutrophil % : x  Auto Lymphocyte % : x  Auto Monocyte % : x  Auto Eosinophil % : x  Auto Basophil % : x    11-19    125[L]  |  96  |  35[H]  ----------------------------<  151[H]  5.1   |  18[L]  |  1.02    Ca    8.3[L]      19 Nov 2024 04:00  Phos  4.0     11-19  Mg     2.5     11-19    TPro  6.0  /  Alb  3.1[L]  /  TBili  1.2  /  DBili  x   /  AST  227[H]  /  ALT  258[H]  /  AlkPhos  177[H]  11-19    PT/INR - ( 18 Nov 2024 22:10 )   PT: 29.1 sec;   INR: 2.51 ratio         PTT - ( 18 Nov 2024 22:10 )  PTT:30.4 sec  Urinalysis Basic - ( 19 Nov 2024 04:00 )    Color: x / Appearance: x / SG: x / pH: x  Gluc: 151 mg/dL / Ketone: x  / Bili: x / Urobili: x   Blood: x / Protein: x / Nitrite: x   Leuk Esterase: x / RBC: x / WBC x   Sq Epi: x / Non Sq Epi: x / Bacteria: x          RADIOLOGY & ADDITIONAL STUDIES REVIEWED DURING TEAM ROUNDS    [ ]GOALS OF CARE DISCUSSION WITH PATIENT/FAMILY/PROXY:    CRITICAL CARE TIME SPENT: 35 minutes   INTERVAL HPI/OVERNIGHT EVENTS: 89Y/O male with PMH: COPD and prostate cancer who presented with 2 day history of SOB, LE swelling, orthopnea, and confusion per his daughter. In ER, pt with increased work of breathing, did not tolerate Bipap. Pt became agitated, hypotensive, and bradycardia. Intubated for airway protection. Bedside POCUS revealed b/l B-lines, significant dilated cardiomyopathy and poor contractility[place] [address]. Patient admitted to ICU for AHRF 2/2 new onset acute systolic heart failure.     PRESSORS: [ ] YES [X ] NO  WHICH:          Antimicrobial:  azithromycin  IVPB 500 milliGRAM(s) IV Intermittent every 24 hours  cefTRIAXone   IVPB 1000 milliGRAM(s) IV Intermittent every 24 hours    Cardiovascular:  furosemide   Injectable 40 milliGRAM(s) IV Push two times a day    Pulmonary:  albuterol/ipratropium for Nebulization 3 milliLiter(s) Nebulizer every 6 hours  fluticasone propionate/ salmeterol 100-50 MICROgram(s) Diskus 1 Dose(s) Inhalation two times a day  tiotropium 2.5 MICROgram(s) Inhaler 2 Puff(s) Inhalation daily    Hematalogic:  enoxaparin Injectable 30 milliGRAM(s) SubCutaneous every 24 hours    Other:  chlorhexidine 0.12% Liquid 15 milliLiter(s) Oral Mucosa every 12 hours  chlorhexidine 2% Cloths 1 Application(s) Topical <User Schedule>  pantoprazole   Suspension 40 milliGRAM(s) Oral daily  propofol Infusion 10 MICROgram(s)/kG/Min IV Continuous <Continuous>    albuterol/ipratropium for Nebulization 3 milliLiter(s) Nebulizer every 6 hours  azithromycin  IVPB 500 milliGRAM(s) IV Intermittent every 24 hours  cefTRIAXone   IVPB 1000 milliGRAM(s) IV Intermittent every 24 hours  chlorhexidine 0.12% Liquid 15 milliLiter(s) Oral Mucosa every 12 hours  chlorhexidine 2% Cloths 1 Application(s) Topical <User Schedule>  enoxaparin Injectable 30 milliGRAM(s) SubCutaneous every 24 hours  fluticasone propionate/ salmeterol 100-50 MICROgram(s) Diskus 1 Dose(s) Inhalation two times a day  furosemide   Injectable 40 milliGRAM(s) IV Push two times a day  pantoprazole   Suspension 40 milliGRAM(s) Oral daily  propofol Infusion 10 MICROgram(s)/kG/Min IV Continuous <Continuous>  tiotropium 2.5 MICROgram(s) Inhaler 2 Puff(s) Inhalation daily    Drug Dosing Weight  Height (cm): 154.9 (18 Nov 2024 16:15)  Weight (kg): 53.6 (18 Nov 2024 23:43)  BMI (kg/m2): 22.3 (18 Nov 2024 23:43)  BSA (m2): 1.51 (18 Nov 2024 23:43)    PHYSICAL EXAM:  GENERAL: elderly, cachetic appearing male, intubated, sedated laying in bed   EYES: PERRL  NECK: Supple, No JVD; Trachea midline  NERVOUS SYSTEM:  sedated, withdraws to pain in B/L upper and lower extremities, not following commands, +cough and gag  CHEST/LUNG:  breath sounds present and crackles bilaterally, No rales, rhonchi, wheezing  HEART: Regular rate and rhythm; No murmurs, rubs, or gallops  ABDOMEN: Soft, Nondistended; Bowel sounds present, no pain or masses on palpation  : voiding well, Roth in place  EXTREMITIES:  2+ Peripheral Pulses, +4 b/l lower extremity edema. No clubbing, cyanosis  SKIN: warm, intact, no lesions, brisk capillary refill       LINES/DRAINS/DEVICES  CENTRAL LINE: [ ] YES [X ] NO  LOCATION:     ROTH: [X ] YES [ ] NO     A-LINE:  [ ] YES [X ] NO  LOCATION:       ICU Vital Signs Last 24 Hrs  T(C): 37.1 (19 Nov 2024 06:00), Max: 37.1 (19 Nov 2024 06:00)  T(F): 98.8 (19 Nov 2024 06:00), Max: 98.8 (19 Nov 2024 06:00)  HR: 69 (19 Nov 2024 06:00) (68 - 92)  BP: 110/64 (19 Nov 2024 06:00) (93/64 - 126/77)  BP(mean): 79 (19 Nov 2024 06:00) (71 - 106)  ABP: --  ABP(mean): --  RR: 14 (19 Nov 2024 06:00) (14 - 30)  SpO2: 100% (19 Nov 2024 06:00) (58% - 100%)    O2 Parameters below as of 19 Nov 2024 04:00  Patient On (Oxygen Delivery Method): ventilator    O2 Concentration (%): 40        ABG - ( 19 Nov 2024 03:27 )  pH, Arterial: 7.32  pH, Blood: x     /  pCO2: 32    /  pO2: 309   / HCO3: 16    / Base Excess: -8.5  /  SaO2: 100                   11-18 @ 07:01  -  11-19 @ 07:00  --------------------------------------------------------  IN: 32 mL / OUT: 640 mL / NET: -608 mL        Mode: AC/ CMV (Assist Control/ Continuous Mandatory Ventilation)  RR (machine): 18  TV (machine): 400  FiO2: 50  PEEP: 5  ITime: 0.9  MAP: 8  PIP: 17        LABS:  CBC Full  -  ( 19 Nov 2024 04:00 )  WBC Count : 7.90 K/uL  RBC Count : 4.08 M/uL  Hemoglobin : 11.1 g/dL  Hematocrit : 33.4 %  Platelet Count - Automated : 133 K/uL  Mean Cell Volume : 81.9 fl  Mean Cell Hemoglobin : 27.2 pg  Mean Cell Hemoglobin Concentration : 33.2 g/dL  Auto Neutrophil # : x  Auto Lymphocyte # : x  Auto Monocyte # : x  Auto Eosinophil # : x  Auto Basophil # : x  Auto Neutrophil % : x  Auto Lymphocyte % : x  Auto Monocyte % : x  Auto Eosinophil % : x  Auto Basophil % : x    11-19    125[L]  |  96  |  35[H]  ----------------------------<  151[H]  5.1   |  18[L]  |  1.02    Ca    8.3[L]      19 Nov 2024 04:00  Phos  4.0     11-19  Mg     2.5     11-19    TPro  6.0  /  Alb  3.1[L]  /  TBili  1.2  /  DBili  x   /  AST  227[H]  /  ALT  258[H]  /  AlkPhos  177[H]  11-19    PT/INR - ( 18 Nov 2024 22:10 )   PT: 29.1 sec;   INR: 2.51 ratio         PTT - ( 18 Nov 2024 22:10 )  PTT:30.4 sec  Urinalysis Basic - ( 19 Nov 2024 04:00 )    Color: x / Appearance: x / SG: x / pH: x  Gluc: 151 mg/dL / Ketone: x  / Bili: x / Urobili: x   Blood: x / Protein: x / Nitrite: x   Leuk Esterase: x / RBC: x / WBC x   Sq Epi: x / Non Sq Epi: x / Bacteria: x          RADIOLOGY & ADDITIONAL STUDIES REVIEWED DURING TEAM ROUNDS    [ ]GOALS OF CARE DISCUSSION WITH PATIENT/FAMILY/PROXY:    CRITICAL CARE TIME SPENT: 35 minutes

## 2024-11-19 NOTE — CONSULT NOTE ADULT - SUBJECTIVE AND OBJECTIVE BOX
[facility] Geriatric and Palliative Consult Service:  Mariana Williamson DO: cell ([phone])  Rex Jones MD: cell ([phone])  Tom Delgado NP: cell ([phone])   Lei Mckeon SW: cell ([phone])   Jessica Fleischer-Black, MD: cell ([phone])    Can contact any Palliative Team member via [employer] Teams for consults and questions    HPI:  Patient is a 89 y/o M with PMH COPD, and prostate cancer (in remission s/p radiation) who presented with 2 day history of worsening shortness of breath, and confusion. Patient was intubated at time of evaluation and collateral history was provided by Buffy Salinas (Daughter - on call). As per daughter patient had been complaining of worsening shortness of breath and difficulty breathing for 2 days. She denies any associated cough, phlegm, or fevers. She reports patient complained of worsening shortness of breath while lying down, and she did notice worsening LE swelling in the past few weeks. She reports patient was diagnosed with left sided pneumonia 3 weeks ago and completed course of Augmentin outpatient after which his symptoms resolved. As per the daughter patient has no history of cardiac disease, and has no history of stent placement, or MI. She reports patient is non-compliant with symbicort and albuterol inhaler, but is not on any other medication. She denies any other symptoms that patient complained about recently.   ED course: Patient was given ceftriaxone, azithromycin, and given 3L IV fluid. Paitent was also placed on BiPAP due to increased work of breathing. However, patient was not able to tolerate BiPAP and ripped it off. Patient was saturating 73% on room air at which time patient was intubated.  (18 Nov 2024 23:57)      PAST MEDICAL & SURGICAL HISTORY:  Prostate cancer      Chronic obstructive pulmonary disease          SOCIAL HISTORY:    Admitted from:  home    Tobacco hx: former smoker  [employer]: [employer]  Language preferred: Portuguese    FAMILY HISTORY:   unable to obtain from patient due to poor mentation  Baseline ADLs (prior to admission): unclear    Allergies    No Known Allergies    Intolerances      Review of Systems: Unable to obtain due to poor mentation  Present Symptoms:   Pain:             Location -                               Aggravating factors -             Quality -             Radiation -             Timing-             Severity (0-10 scale):             Minimal acceptable level (0-10 scale):  Fatigue:  Nausea:  Lack of Appetite:   SOB:  Depression:  Anxiety:  Constipation:     CPOT:    https://ccs-st.org/resources/Documents/Sedation%20Analgesia%20Delirium%20in%20CC/CCS%20STH%20Guideline%20template%20CPOT.pdf  PAIN AD Score: 0  [email or web address] (press ctrl +  left click to view)      MEDICATIONS  (STANDING):  chlorhexidine 0.12% Liquid 15 milliLiter(s) Oral Mucosa every 12 hours  chlorhexidine 2% Cloths 1 Application(s) Topical <User Schedule>  DOBUTamine Infusion 2.5 MICROgram(s)/kG/Min (4.02 mL/Hr) IV Continuous <Continuous>  enoxaparin Injectable 30 milliGRAM(s) SubCutaneous every 24 hours  fluticasone propionate/ salmeterol 100-50 MICROgram(s) Diskus 1 Dose(s) Inhalation two times a day  furosemide   Injectable 40 milliGRAM(s) IV Push two times a day  pantoprazole   Suspension 40 milliGRAM(s) Oral daily  propofol Infusion 10 MICROgram(s)/kG/Min (3.22 mL/Hr) IV Continuous <Continuous>  tiotropium 2.5 MICROgram(s) Inhaler 2 Puff(s) Inhalation daily    MEDICATIONS  (PRN):  albuterol/ipratropium for Nebulization 3 milliLiter(s) Nebulizer every 6 hours PRN Shortness of Breath and/or Wheezing      PHYSICAL EXAM:  Vital Signs Last 24 Hrs  T(C): 37.2 (19 Nov 2024 13:00), Max: 37.3 (19 Nov 2024 11:00)  T(F): 99 (19 Nov 2024 13:00), Max: 99.1 (19 Nov 2024 11:00)  HR: 75 (19 Nov 2024 13:00) (66 - 92)  BP: 111/71 (19 Nov 2024 13:00) (93/64 - 126/77)  BP(mean): 80 (19 Nov 2024 13:00) (70 - 106)  RR: 13 (19 Nov 2024 13:00) (12 - 30)  SpO2: 100% (19 Nov 2024 13:00) (58% - 100%)    Parameters below as of 19 Nov 2024 04:00  Patient On (Oxygen Delivery Method): ventilator    O2 Concentration (%): 40    General: oriented x  0. Intubated. Sedated. In ICU    Palliative Performance Scale/Karnofsky Score: 10%  [email or web address]    HEENT:ET tube  Lungs: mechanical ventilation. No wheeze  CV: RRR, S1S2  GI: soft non distended non tender   :  meredith  Musculoskeletal: weakness x4   Skin:  poor skin turgor, pressure ulcer stage: unstagable on sacrum  Neuro: Sedated. Moving all 4 extremities  Oral intake ability: unable/only mouth care    LABS:                        11.1   7.90  )-----------( 133      ( 19 Nov 2024 04:00 )             33.4     11-19    125[L]  |  96  |  35[H]  ----------------------------<  151[H]  5.1   |  18[L]  |  1.02    Ca    8.3[L]      19 Nov 2024 04:00  Phos  4.0     11-19  Mg     2.5     11-19    TPro  6.0  /  Alb  3.1[L]  /  TBili  1.2  /  DBili  x   /  AST  227[H]  /  ALT  258[H]  /  AlkPhos  177[H]  11-19    Urinalysis Basic - ( 19 Nov 2024 04:00 )    Color: x / Appearance: x / SG: x / pH: x  Gluc: 151 mg/dL / Ketone: x  / Bili: x / Urobili: x   Blood: x / Protein: x / Nitrite: x   Leuk Esterase: x / RBC: x / WBC x   Sq Epi: x / Non Sq Epi: x / Bacteria: x        RADIOLOGY & ADDITIONAL STUDIES: Reviewed         [facility] Geriatric and Palliative Consult Service:  Mariana Williamson DO: cell ([phone])  Rex Jones MD: cell ([phone])  Tom Delgado NP: cell ([phone])   Lei Mckeon SW: cell ([phone])   Jessica Fleischer-Black, MD: cell ([phone])    Can contact any Palliative Team member via [employer] Teams for consults and questions    HPI:  Patient is a 89 y/o M with PMH COPD, and prostate cancer (in remission s/p radiation) who presented with 2 day history of worsening shortness of breath, and confusion. Patient was intubated at time of evaluation and collateral history was provided by Buffy Salinas (Daughter - on call). As per daughter patient had been complaining of worsening shortness of breath and difficulty breathing for 2 days. She denies any associated cough, phlegm, or fevers. She reports patient complained of worsening shortness of breath while lying down, and she did notice worsening LE swelling in the past few weeks. She reports patient was diagnosed with left sided pneumonia 3 weeks ago and completed course of Augmentin outpatient after which his symptoms resolved. As per the daughter patient has no history of cardiac disease, and has no history of stent placement, or MI. She reports patient is non-compliant with symbicort and albuterol inhaler, but is not on any other medication. She denies any other symptoms that patient complained about recently.   ED course: Patient was given ceftriaxone, azithromycin, and given 3L IV fluid. Paitent was also placed on BiPAP due to increased work of breathing. However, patient was not able to tolerate BiPAP and ripped it off. Patient was saturating 73% on room air at which time patient was intubated.  (18 Nov 2024 23:57)      PAST MEDICAL & SURGICAL HISTORY:  Prostate cancer      Chronic obstructive pulmonary disease          SOCIAL HISTORY:    Admitted from:  home    Tobacco hx: former smoker  [employer]: [employer]ism  Language preferred: Zambian    FAMILY HISTORY:   unable to obtain from patient due to poor mentation  Baseline ADLs (prior to admission): unclear    Allergies    No Known Allergies    Intolerances      Review of Systems: Unable to obtain due to poor mentation  Present Symptoms:   Pain:             Location -                               Aggravating factors -             Quality -             Radiation -             Timing-             Severity (0-10 scale):             Minimal acceptable level (0-10 scale):  Fatigue:  Nausea:  Lack of Appetite:   SOB:  Depression:  Anxiety:  Constipation:     CPOT:    https://ccs-st.org/resources/Documents/Sedation%20Analgesia%20Delirium%20in%20CC/CCS%20STH%20Guideline%20template%20CPOT.pdf  PAIN AD Score: 0  [email or web address] (press ctrl +  left click to view)      MEDICATIONS  (STANDING):  chlorhexidine 0.12% Liquid 15 milliLiter(s) Oral Mucosa every 12 hours  chlorhexidine 2% Cloths 1 Application(s) Topical <User Schedule>  DOBUTamine Infusion 2.5 MICROgram(s)/kG/Min (4.02 mL/Hr) IV Continuous <Continuous>  enoxaparin Injectable 30 milliGRAM(s) SubCutaneous every 24 hours  fluticasone propionate/ salmeterol 100-50 MICROgram(s) Diskus 1 Dose(s) Inhalation two times a day  furosemide   Injectable 40 milliGRAM(s) IV Push two times a day  pantoprazole   Suspension 40 milliGRAM(s) Oral daily  propofol Infusion 10 MICROgram(s)/kG/Min (3.22 mL/Hr) IV Continuous <Continuous>  tiotropium 2.5 MICROgram(s) Inhaler 2 Puff(s) Inhalation daily    MEDICATIONS  (PRN):  albuterol/ipratropium for Nebulization 3 milliLiter(s) Nebulizer every 6 hours PRN Shortness of Breath and/or Wheezing      PHYSICAL EXAM:  Vital Signs Last 24 Hrs  T(C): 37.2 (19 Nov 2024 13:00), Max: 37.3 (19 Nov 2024 11:00)  T(F): 99 (19 Nov 2024 13:00), Max: 99.1 (19 Nov 2024 11:00)  HR: 75 (19 Nov 2024 13:00) (66 - 92)  BP: 111/71 (19 Nov 2024 13:00) (93/64 - 126/77)  BP(mean): 80 (19 Nov 2024 13:00) (70 - 106)  RR: 13 (19 Nov 2024 13:00) (12 - 30)  SpO2: 100% (19 Nov 2024 13:00) (58% - 100%)    Parameters below as of 19 Nov 2024 04:00  Patient On (Oxygen Delivery Method): ventilator    O2 Concentration (%): 40    General: oriented x  0. Intubated. Sedated. In ICU    Palliative Performance Scale/Karnofsky Score: 10%  [email or web address]    HEENT:ET tube  Lungs: mechanical ventilation. No wheeze  CV: RRR, S1S2  GI: soft non distended non tender   :  meredith  Musculoskeletal: weakness x4 2+ pitting edema to b/l thighs  Skin:  poor skin turgor, pressure ulcer stage: unstagable on sacrum  Neuro: Sedated. Moving all 4 extremities  Oral intake ability: unable/only mouth care    LABS:                        11.1   7.90  )-----------( 133      ( 19 Nov 2024 04:00 )             33.4     11-19    125[L]  |  96  |  35[H]  ----------------------------<  151[H]  5.1   |  18[L]  |  1.02    Ca    8.3[L]      19 Nov 2024 04:00  Phos  4.0     11-19  Mg     2.5     11-19    TPro  6.0  /  Alb  3.1[L]  /  TBili  1.2  /  DBili  x   /  AST  227[H]  /  ALT  258[H]  /  AlkPhos  177[H]  11-19    Urinalysis Basic - ( 19 Nov 2024 04:00 )    Color: x / Appearance: x / SG: x / pH: x  Gluc: 151 mg/dL / Ketone: x  / Bili: x / Urobili: x   Blood: x / Protein: x / Nitrite: x   Leuk Esterase: x / RBC: x / WBC x   Sq Epi: x / Non Sq Epi: x / Bacteria: x        RADIOLOGY & ADDITIONAL STUDIES: Reviewed

## 2024-11-19 NOTE — DIETITIAN INITIAL EVALUATION ADULT - PHYSCIAL ASSESSMENT
Visual severe muscle loss: temporal, clavicle, shoulder; Visual severe fat loss: orbital, bucco, triceps Visual severe muscle loss: temporal, clavicle, shoulder; Visual severe fat loss: orbital, bucco  ( but findings may also due to age related sarcopenia)/underweight/emaciated

## 2024-11-19 NOTE — DIETITIAN INITIAL EVALUATION ADULT - OTHER INFO
Pt lives home with family support PTA, in ICU, sedated on Vent support, Propofol at 6.4 ml/hr ( if 24hr, 154 ml or 169 kcal); Limited intake/wt change history data available at present; Unknown food allergies per Chart; NPO at present

## 2024-11-19 NOTE — CONSULT NOTE ADULT - PROBLEM SELECTOR RECOMMENDATION 9
Unclear baseline  resp failure on this admission may reflect infection vs CHF vs COPD exacerbation  Currently intubated. Resp failure on this admission may reflect infection vs CHF vs COPD exacerbation  Now main component is CHF. Being diuresed. On dobutamine.  guarded prognosis

## 2024-11-19 NOTE — CONSULT NOTE ADULT - PROBLEM SELECTOR RECOMMENDATION 2
Left message for daughter Buffy Rest at [phone] to discuss baseline resp function and ADLs. Pressure sore on sacrum suggests that he has not been very ambulatory prior to admission. Unclear baseline. No mention of home 02 in H & P  will reach out to family for collateral information    Currently intubated.

## 2024-11-19 NOTE — CONSULT NOTE ADULT - SUBJECTIVE AND OBJECTIVE BOX
Cardiology Consult Note   [Please check [email or web address] password: [email or web address]" for cardiology service schedule and contact information]    History of Present Illness:   89 y/o M with PMH COPD, and prostate cancer (in remission s/p radiation) who presented with 2 day history of worsening shortness of breath, and confusion. Patient was intubated at time of evaluation and collateral history was provided by Buffy Salinas (Daughter - on call). As per daughter patient had been complaining of worsening shortness of breath and difficulty breathing for 2 days. She denies any associated cough, phlegm, or fevers. She reports patient complained of worsening shortness of breath while lying down, and she did notice worsening LE swelling in the past few weeks. She reports patient was diagnosed with left sided pneumonia 3 weeks ago and completed course of Augmentin outpatient after which his symptoms resolved. As per the daughter patient has no history of cardiac disease, and has no history of stent placement, or MI. She reports patient is non-compliant with symbicort and albuterol inhaler, but is not on any other medication. She denies any other symptoms that patient complained about recently.   ED course: Patient was given ceftriaxone, azithromycin, and given 3L IV fluid. Paitent was also placed on BiPAP due to increased work of breathing. However, patient was not able to tolerate BiPAP and ripped it off. Patient was saturating 73% on room air at which time patient was intubated.     Xray: mild interstitial infiltrates with bilateral pl effusions.  CTA chest/abdomen showed no PE, mild bilateral GGOs, LLL nodule, cholestasis   POCUS at bedside showed dilated LV with severely decreased LV systolic contractility, no RV strain, no pericardial effusion, IVC plethoric, bilateral pleural effusions.    PAST MEDICAL & SURGICAL HISTORY:  Prostate cancer  Chronic obstructive pulmonary disease    FAMILY HISTORY:    SOCIAL HISTORY:  unchanged    MEDICATIONS:  DOBUTamine Infusion 2.5 MICROgram(s)/kG/Min IV Continuous <Continuous>  enoxaparin Injectable 30 milliGRAM(s) SubCutaneous every 24 hours  furosemide   Injectable 40 milliGRAM(s) IV Push two times a day      albuterol/ipratropium for Nebulization 3 milliLiter(s) Nebulizer every 6 hours PRN  fluticasone propionate/ salmeterol 100-50 MICROgram(s) Diskus 1 Dose(s) Inhalation two times a day  tiotropium 2.5 MICROgram(s) Inhaler 2 Puff(s) Inhalation daily    propofol Infusion 10 MICROgram(s)/kG/Min IV Continuous <Continuous>    pantoprazole   Suspension 40 milliGRAM(s) Oral daily      chlorhexidine 0.12% Liquid 15 milliLiter(s) Oral Mucosa every 12 hours  chlorhexidine 2% Cloths 1 Application(s) Topical <User Schedule>      REVIEW OF SYSTEMS:  Unable to assess.     All other review of systems is negative unless indicated above.   -------------------------------------------------------------------------------------------  PHYSICAL EXAM:  T(C): 37.2 (11-19-24 @ 13:00), Max: 37.3 (11-19-24 @ 11:00)  HR: 77 (11-19-24 @ 16:12) (66 - 92)  BP: 111/71 (11-19-24 @ 13:00) (93/64 - 126/77)  RR: 13 (11-19-24 @ 13:00) (12 - 22)  SpO2: 100% (11-19-24 @ 16:12) (58% - 100%)  Wt(kg): --  I&O's Summary    18 Nov 2024 07:01  -  19 Nov 2024 07:00  --------------------------------------------------------  IN: 38.4 mL / OUT: 740 mL / NET: -701.6 mL    19 Nov 2024 07:01  -  19 Nov 2024 16:26  --------------------------------------------------------  IN: 32 mL / OUT: 1100 mL / NET: -1068 mL      GENERAL: elderly, cachetic appearing male, intubated, sedated laying in bed   EYES: PERRL  NECK: Supple, No JVD; Trachea midline  NERVOUS SYSTEM:  sedated, withdraws to pain in B/L upper and lower extremities, not following commands, +cough and gag  CHEST/LUNG:  breath sounds present and crackles bilaterally, No rales, rhonchi, wheezing  HEART: Regular rate and rhythm; No murmurs, rubs, or gallops  ABDOMEN: Soft, Nondistended; Bowel sounds present, no pain or masses on palpation  : voiding well, Hilliard in place  EXTREMITIES:  2+ Peripheral Pulses, +4 b/l lower extremity edema. No clubbing, cyanosis  SKIN: warm, intact, no lesions, brisk capillary refill       -------------------------------------------------------------------------------------------  LABS:  11-19    104[LL]  |  78[L]  |  33[H]  ----------------------------<  211[H]  5.8[H]   |  20[L]  |  1.03    Ca    <5.0[LL]      19 Nov 2024 15:07  Phos  3.1     11-19  Mg     2.1     11-19    TPro  5.5[L]  /  Alb  2.0[L]  /  TBili  3.1[H]  /  DBili  x   /  AST  High  /  ALT  High  /  AlkPhos  131[H]  11-19    Creatinine Trend: 1.03<--, 1.02<--, 1.01<--, 1.06<--, 1.16<--                        11.1   7.90  )-----------( 133      ( 19 Nov 2024 04:00 )             33.4     PT/INR - ( 18 Nov 2024 22:10 )   PT: 29.1 sec;   INR: 2.51 ratio         PTT - ( 18 Nov 2024 22:10 )  PTT:30.4 sec    Lipid Panel: DOBUTamine Infusion 2.5 MICROgram(s)/kG/Min IV Continuous <Continuous>  enoxaparin Injectable 30 milliGRAM(s) SubCutaneous every 24 hours  furosemide   Injectable 40 milliGRAM(s) IV Push two times a day    Cardiac Enzymes:         -------------------------------------------------------------------------------------------  Meds:  pantoprazole   Suspension 40 milliGRAM(s) Oral daily    -------------------------------------------------------------------------------------------  Cardiovascular Diagnostic Testing:    ECG:     Echo:     Stress Testing:    Cath:    Imaging:    CXR:  reviewed  -------------------------------------------------------------------------------------------  Problems Assessed:     Cardiogenic Shock 2/2 acutely decompensated HFrEF  Evidence on end organ dysfunction with transaminitis, and lactic acidosis.   TTE preliminarily shows severely decreased LV dysfunction with EF 10-15% on my review.   Lactate improving on inotrope therapy.   Continue IV diuresis with goal net negative 1-2 liters/24 hours.   If decompensates, consider calling shock team for evaluation.     -------------------------------------------------------------------------------------------  Kenny Flanagan MD   of Cardiology  [facility] at [address], [address]  [place] [address]  Phone: [phone]  Fax: [phone] Cardiology Consult Note   [Please check [email or web address] password: "[email or web address]" for cardiology service schedule and contact information]    History of Present Illness:   89 y/o M with PMH COPD, and prostate cancer (in remission s/p radiation) who presented with 2 day history of worsening shortness of breath, and confusion. Patient was intubated at time of evaluation and collateral history was provided by Buffy Salinas (Daughter - on call). As per daughter patient had been complaining of worsening shortness of breath and difficulty breathing for 2 days. She denies any associated cough, phlegm, or fevers. She reports patient complained of worsening shortness of breath while lying down, and she did notice worsening LE swelling in the past few weeks. She reports patient was diagnosed with left sided pneumonia 3 weeks ago and completed course of Augmentin outpatient after which his symptoms resolved. As per the daughter patient has no history of cardiac disease, and has no history of stent placement, or MI. She reports patient is non-compliant with symbicort and albuterol inhaler, but is not on any other medication. She denies any other symptoms that patient complained about recently.   ED course: Patient was given ceftriaxone, azithromycin, and given 3L IV fluid. Paitent was also placed on BiPAP due to increased work of breathing. However, patient was not able to tolerate BiPAP and ripped it off. Patient was saturating 73% on room air at which time patient was intubated.     Xray: mild interstitial infiltrates with bilateral pl effusions.  CTA chest/abdomen showed no PE, mild bilateral GGOs, LLL nodule, cholestasis   POCUS at bedside showed dilated LV with severely decreased LV systolic contractility, no RV strain, no pericardial effusion, IVC plethoric, bilateral pleural effusions.    PAST MEDICAL & SURGICAL HISTORY:  Prostate cancer  Chronic obstructive pulmonary disease    FAMILY HISTORY:    SOCIAL HISTORY:  unchanged    MEDICATIONS:  DOBUTamine Infusion 2.5 MICROgram(s)/kG/Min IV Continuous <Continuous>  enoxaparin Injectable 30 milliGRAM(s) SubCutaneous every 24 hours  furosemide   Injectable 40 milliGRAM(s) IV Push two times a day      albuterol/ipratropium for Nebulization 3 milliLiter(s) Nebulizer every 6 hours PRN  fluticasone propionate/ salmeterol 100-50 MICROgram(s) Diskus 1 Dose(s) Inhalation two times a day  tiotropium 2.5 MICROgram(s) Inhaler 2 Puff(s) Inhalation daily    propofol Infusion 10 MICROgram(s)/kG/Min IV Continuous <Continuous>    pantoprazole   Suspension 40 milliGRAM(s) Oral daily      chlorhexidine 0.12% Liquid 15 milliLiter(s) Oral Mucosa every 12 hours  chlorhexidine 2% Cloths 1 Application(s) Topical <User Schedule>      REVIEW OF SYSTEMS:  Unable to assess.     All other review of systems is negative unless indicated above.   -------------------------------------------------------------------------------------------  PHYSICAL EXAM:  T(C): 37.2 (11-19-24 @ 13:00), Max: 37.3 (11-19-24 @ 11:00)  HR: 77 (11-19-24 @ 16:12) (66 - 92)  BP: 111/71 (11-19-24 @ 13:00) (93/64 - 126/77)  RR: 13 (11-19-24 @ 13:00) (12 - 22)  SpO2: 100% (11-19-24 @ 16:12) (58% - 100%)  Wt(kg): --  I&O's Summary    18 Nov 2024 07:01  -  19 Nov 2024 07:00  --------------------------------------------------------  IN: 38.4 mL / OUT: 740 mL / NET: -701.6 mL    19 Nov 2024 07:01  -  19 Nov 2024 16:26  --------------------------------------------------------  IN: 32 mL / OUT: 1100 mL / NET: -1068 mL      GENERAL: elderly, cachetic appearing male, intubated, sedated laying in bed   EYES: PERRL  NECK: Supple, No JVD; Trachea midline  NERVOUS SYSTEM:  sedated, withdraws to pain in B/L upper and lower extremities, not following commands, +cough and gag  CHEST/LUNG:  breath sounds present and crackles bilaterally, No rales, rhonchi, wheezing  HEART: Regular rate and rhythm; No murmurs, rubs, or gallops  ABDOMEN: Soft, Nondistended; Bowel sounds present, no pain or masses on palpation  : voiding well, Hilliard in place  EXTREMITIES:  2+ Peripheral Pulses, +4 b/l lower extremity edema. No clubbing, cyanosis  SKIN: warm, intact, no lesions, brisk capillary refill       -------------------------------------------------------------------------------------------  LABS:  11-19    104[LL]  |  78[L]  |  33[H]  ----------------------------<  211[H]  5.8[H]   |  20[L]  |  1.03    Ca    <5.0[LL]      19 Nov 2024 15:07  Phos  3.1     11-19  Mg     2.1     11-19    TPro  5.5[L]  /  Alb  2.0[L]  /  TBili  3.1[H]  /  DBili  x   /  AST  High  /  ALT  High  /  AlkPhos  131[H]  11-19    Creatinine Trend: 1.03<--, 1.02<--, 1.01<--, 1.06<--, 1.16<--                        11.1   7.90  )-----------( 133      ( 19 Nov 2024 04:00 )             33.4     PT/INR - ( 18 Nov 2024 22:10 )   PT: 29.1 sec;   INR: 2.51 ratio         PTT - ( 18 Nov 2024 22:10 )  PTT:30.4 sec    Lipid Panel: DOBUTamine Infusion 2.5 MICROgram(s)/kG/Min IV Continuous <Continuous>  enoxaparin Injectable 30 milliGRAM(s) SubCutaneous every 24 hours  furosemide   Injectable 40 milliGRAM(s) IV Push two times a day    Cardiac Enzymes:         -------------------------------------------------------------------------------------------  Meds:  pantoprazole   Suspension 40 milliGRAM(s) Oral daily    -------------------------------------------------------------------------------------------  Cardiovascular Diagnostic Testing:    ECG:     Echo:     Stress Testing:    Cath:    Imaging:    CXR:  reviewed  -------------------------------------------------------------------------------------------  Problems Assessed:     1. Cardiogenic Shock 2/2 acutely decompensated HFrEF  - unclear etiology. No prior cardiac workup.   Evidence on end organ dysfunction with transaminitis, and lactic acidosis.   TTE preliminarily shows severely decreased LV dysfunction with EF 10-15% on my review.   Lactate improving on inotrope therapy. Continue dobutamine at current dose.   Continue IV diuresis with goal net negative 1-2 liters/24 hours.   If decompensates, consider calling shock team for evaluation.   Palliative team following for GOC discussion- currently remains full code.   Discussed with Dr. Lindsey ICU attending.   2. Transaminitis- improving  3. Hyponatremia   4. Hyperkalemia  5. Acute hypoxic respiratory failure:  - likely 2/2 ADHF   - continue diuresis and vent support   -------------------------------------------------------------------------------------------  Kenny Flanagan MD   of Cardiology  [facility]  [address], [address]  [place] [address]  Phone: [phone]  Fax: [phone]

## 2024-11-19 NOTE — DIETITIAN INITIAL EVALUATION ADULT - PERTINENT LABORATORY DATA
11-19    125[L]  |  96  |  35[H]  ----------------------------<  151[H]  5.1   |  18[L]  |  1.02    Ca    8.3[L]      19 Nov 2024 04:00  Phos  4.0     11-19  Mg     2.5     11-19    TPro  6.0  /  Alb  3.1[L]  /  TBili  1.2  /  DBili  x   /  AST  227[H]  /  ALT  258[H]  /  AlkPhos  177[H]  11-19  POCT Blood Glucose.: 131 mg/dL (11-19-24 @ 05:20)

## 2024-11-19 NOTE — PATIENT PROFILE ADULT - FALL HARM RISK - HARM RISK INTERVENTIONS
Communicate Risk of Fall with Harm to all staff/Reinforce activity limits and safety measures with patient and family/Use of alarms - bed, chair and/or voice tab/Visual Cue: Yellow wristband and red socks/Bed in lowest position, wheels locked, appropriate side rails in place/Physically safe environment - no spills, clutter or unnecessary equipment/Purposeful Proactive Rounding/Room/bathroom lighting operational, light cord in reach/Unable to comprehend

## 2024-11-19 NOTE — PATIENT PROFILE ADULT - FUNCTIONAL ASSESSMENT - BASIC MOBILITY SECTION LABEL
4/4. Juarez. Choices for rehab are [facility] and [facility]. Referral made to both facilities. Felicity Short RN      1400- [facility] Felicity Short RN    Pt updated by LUZ Owusu RN case Shelli Gleason Child -  .

## 2024-11-19 NOTE — DIETITIAN INITIAL EVALUATION ADULT - NSFNSPHYEXAMSKINFT_GEN_A_CORE
Pressure Injury 1: coccyx, Unstageable  Pressure Injury 2: none, none  Pressure Injury 3: none, none  Pressure Injury 4: none, none  Pressure Injury 5: none, none  Pressure Injury 6: none, none  Pressure Injury 7: none, none  Pressure Injury 8: none, none  Pressure Injury 9: none, none  Pressure Injury 10: none, none  Pressure Injury 11: none, none Pressure Injury 1: coccyx, Unstageable

## 2024-11-20 NOTE — PROGRESS NOTE ADULT - PROBLEM SELECTOR PLAN 1
Resp failure on this admission may reflect infection vs CHF vs COPD exacerbation  Now main component is CHF. Being diuresed. On dobutamine.  guarded prognosis.

## 2024-11-20 NOTE — PROGRESS NOTE ADULT - ASSESSMENT
Patient is a 89 y/o M with PMH COPD, and prostate cancer (in remission s/p radiation) who presented with 2 day history of worsening shortness of breath, and confusion. As per daughter patient had been complaining of worsening shortness of breath and difficulty breathing for 2 days associated with LE swelling, and orthopnea. Patient is admitted for AHRF likely 2/2 to acute systolic HF. Cardiology was consulted

## 2024-11-20 NOTE — PROGRESS NOTE ADULT - SUBJECTIVE AND OBJECTIVE BOX
follow up on:  complex medical decision making related to goals of care    [facility] Palliative Consult Service:  Mariana Williamson DO: cell [phone])  Rex Jones MD: cell ([phone])  Tom Delgado NP: cell ([phone])   Lei Mckeon LMSW: cell [phone])   Jessica Fleischer-Black, MD: cell: ([phone])    Can contact any Palliative Team member via Microsoft Teams for consults and questions    OVERNIGHT EVENTS: TTE showed EF 18%. Lactate improving. Plan for SAT/SBT today    Present Symptoms:     Review of Systems:  Unable to obtain due to poor mentation  Present Symptoms:   Pain:             Location -                               Aggravating factors -             Quality -             Radiation -             Timing-             Severity (0-10 scale):             Minimal acceptable level (0-10 scale):  Fatigue:  Nausea:  Lack of Appetite:   SOB:  Depression:  Anxiety:  Constipation:     CPOT:    https://ccs-st.org/resources/Documents/Sedation%20Analgesia%20Delirium%20in%20CC/CCS%20STH%20Guideline%20template%20CPOT.pdf  PAIN AD Score:   http://geriatrictoolkit.[place].[facility]/cog/painad.pdf (press ctrl +  left click to view)MEDICATIONS  (STANDING):  chlorhexidine 0.12% Liquid 15 milliLiter(s) Oral Mucosa every 12 hours  chlorhexidine 2% Cloths 1 Application(s) Topical <User Schedule>  DOBUTamine Infusion 2.5 MICROgram(s)/kG/Min (4.02 mL/Hr) IV Continuous <Continuous>  enoxaparin Injectable 30 milliGRAM(s) SubCutaneous every 24 hours  pantoprazole   Suspension 40 milliGRAM(s) Oral daily  propofol Infusion 10 MICROgram(s)/kG/Min (3.22 mL/Hr) IV Continuous <Continuous>    MEDICATIONS  (PRN):  albuterol/ipratropium for Nebulization 3 milliLiter(s) Nebulizer every 6 hours PRN Shortness of Breath and/or Wheezing      PHYSICAL EXAM:  Vital Signs Last 24 Hrs  T(C): 37.2 (20 Nov 2024 12:00), Max: 37.4 (19 Nov 2024 21:00)  T(F): 99 (20 Nov 2024 12:00), Max: 99.3 (19 Nov 2024 21:00)  HR: 83 (20 Nov 2024 12:00) (73 - 84)  BP: 101/53 (20 Nov 2024 12:00) (99/61 - 121/66)  BP(mean): 69 (20 Nov 2024 12:00) (64 - 81)  RR: 15 (20 Nov 2024 12:00) (12 - 19)  SpO2: 100% (20 Nov 2024 12:00) (99% - 100%)    Parameters below as of 20 Nov 2024 07:00  Patient On (Oxygen Delivery Method): ventilator    O2 Concentration (%): 40    General: oriented x 0 Sedated on propofol    Palliative Performance Scale/Karnofsky Score: 10%  http://npcrc.org/files/news/palliative_performance_scale_ppsv2.pdf    HEENT:  ET tube/NGT  Lungs: CTA b/l. No wheeze. In ventilator  CV: RRR, S1S2, tachycardia  GI: soft non distended non tender  incontinent   :   meredith  Musculoskeletal: weakness x4 edema x2 to b/l LE   bedbound in ICU bed  Skin:  pressure ulcer to sacrum. Unstagable  Neuro: sedated  Oral intake ability: unable/only mouth care    LABS:                          10.6   10.15 )-----------( 159      ( 20 Nov 2024 03:37 )             30.4     11-20    131[L]  |  98  |  28[H]  ----------------------------<  117[H]  3.6   |  26  |  0.85    Ca    8.4      20 Nov 2024 03:37  Phos  3.4     11-20  Mg     2.3     11-20    TPro  5.8[L]  /  Alb  3.0[L]  /  TBili  1.1  /  DBili  x   /  AST  118[H]  /  ALT  196[H]  /  AlkPhos  151[H]  11-20    Urinalysis Basic - ( 20 Nov 2024 03:37 )    Color: x / Appearance: x / SG: x / pH: x  Gluc: 117 mg/dL / Ketone: x  / Bili: x / Urobili: x   Blood: x / Protein: x / Nitrite: x   Leuk Esterase: x / RBC: x / WBC x   Sq Epi: x / Non Sq Epi: x / Bacteria: x        RADIOLOGY & ADDITIONAL STUDIES:    TRANSTHORACIC ECHOCARDIOGRAM REPORT  ________________________________________________________________________________                                      _______       Pt. Name:       CHE FERGUSON Study Date:    11/19/2024  MRN:            [id number]          YOB: 1928  Accession #:    [id number]          Age:           89 years  Account#:       [id number]         Gender:        M  Heart Rate:                        Height:        60.00 in (152.40 cm)  Rhythm:                            Weight:        93.00 lb (42.18 kg)  Blood Pressure: 111/61 mmHg        BSA/BMI:       1.35 m² / 18.16 kg/m²  ________________________________________________________________________________________  Referring Physician:    [id number] Memo Zepeda  Interpreting Physician: Malgorzata Palumbo MD  Primary Sonographer:    Bradford Lopez [facility]    CPT:               ECHO TTE WO CON COMP W DOPP - 11244.m  Indication(s):     Heart failure, unspecified - I50.9  Procedure:         Transthoracic echocardiogram with 2-D, M-mode and complete                     spectral and color flow Doppler.  Ordering Location: [facility]  Admission Status:  Inpatient  Study Information: Image quality for this study is adequate.    _______________________________________________________________________________________     CONCLUSIONS:      1. Left ventricular cavity is mildly dilated. Left ventricular wall thickness is normal. Left ventricular systolic function is severely decreased with an ejection fraction of 18 % by Mejias's method of disks. Global left ventricular hypokinesis.   2. There is moderate (grade 2) left ventricular diastolic dysfunction.   3. Normal right ventricular cavity size, with normal wall thickness, and normal right ventricular systolic function. Tricuspid annular plane systolic excursion (TAPSE) is 1.7 cm (normal >=1.7 cm). Tricuspid annular tissue Doppler S' is 11.0 cm/s (normal >10 cm/s).   4. Left atrium is severely dilated.   5. Moderate mitral regurgitation.   6. Mild tricuspid regurgitation.   7. Estimated pulmonary artery systolic pressure is 44 mmHg, consistent with mild pulmonary hypertension.   8. The inferior vena cava is normal in size measuring 1.90 cm in diameter, (normal <2.1cm) with abnormal inspiratory collapse (abnormal <50%) consistent with mildly elevated right atrial pressure (~8, range 5-10mmHg).   9. No pericardial effusion seen.  10. Bilateral pleural effusion noted.  11. No prior echocardiogram is available for comparison.    ________________________________________________________________________________________  FINDINGS:     Left Ventricle:  The left ventricular cavity is mildly dilated. Left ventricular wall thickness is normal. Left ventricular systolic function is severely decreased with a calculated ejection fraction of 18 % by the Mejias's biplane method of disks. There is global left ventricular hypokinesis. There is moderate (grade 2) left ventricular diastolic dysfunction.     Right Ventricle:  The right ventricular cavity is normal in size, with normal wall thickness and right ventricular systolic function is normal. Tricuspid annular plane systolic excursion (TAPSE) is 1.7 cm (normal >=1.7 cm). Tricuspid annular tissue Doppler S' is 11.0 cm/s (normal >10 cm/s).     Left Atrium:  The left atrium is severely dilated with an indexed volume of 89.36 ml/m².     Right Atrium:  The right atrium is normal in size with an indexed area of 15.50 cm²/m².     Interatrial Septum:  The interatrial septum appears intact.     Aortic Valve:  The aortic valve appears trileaflet with reduced systolic excursion. There is severe aortic stenosis. The peak transaortic velocity is 2.47 m/s, peak transaortic gradient is 24.4 mmHg and mean transaortic gradient is 14.2 mmHg with an LVOT/aortic valve VTI ratio of 0.39. The aortic valve area is estimated at 1.03 cm² by the continuity equation. There is no evidence of aortic regurgitation.     Mitral Valve:  Structurally normal mitral valve with normal leaflet excursion. There is caseous mitral annular calcification noted of the mitral valve annulus. There is mild leaflet calcification. There is no mitral valve stenosis. There is moderate mitral regurgitation.     Tricuspid Valve:  Structurally normal tricuspid valve with normal leaflet excursion. There is mild tricuspid regurgitation. Estimated pulmonary artery systolic pressure is 44 mmHg, consistent with mild pulmonary hypertension.     Pulmonic Valve:  Structurally normal pulmonic valve with normal leaflet excursion. There is trace pulmonic regurgitation.     Aorta:  The aortic root appears normal in size.     Pericardium:  No pericardial effusion seen.     Pleura:  Bilateral pleural effusion noted.     Systemic Veins:  The inferior vena cava is normal in size measuring 1.90 cm in diameter, (normal <2.1cm) with abnormal inspiratory collapse (abnormal <50%) consistent with mildly elevated right atrial pressure (~8, range 5-10mmHg).  ____________________________________________________________________  QUANTITATIVE DATA:  Left Ventricle Measurements: (Indexed to BSA)     IVSd (2D):   0.7 cm  LVPWd (2D):  0.7 cm  LVIDd (2D):  6.3 cm  LVIDs (2D):  6.0 cm  LV Mass:     174 g  129.1 g/m²  LV Vol d, MOD A2C: 163.9 ml 121.55 ml/m²  LV Vol d, MOD A4C: 174.2 ml 129.25 ml/m²  LV Vol d, MOD BP:  169.1 ml 125.46 ml/m²  LV Vol s, MOD A2C: 128.4 ml 95.28 ml/m²  LV Vol s, MOD A4C: 147.4 ml 109.36 ml/m²  LV Vol s, MOD BP:  138.2 ml 102.49 ml/m²  LVOT SV MOD BP:    31.0 ml  LV EF% MOD BP:     18 %     MV E Vmax: 1.01 m/s  MV A Vmax: 1.14 m/s  MV E/A:    0.89  MV DT:     313 msec    Aorta Measurements: (Normal range) (Indexed to BSA)     Ao Root 3.1 cm            Left Atrium Measurements: (Indexed to BSA)  LA Diam 2D:        4.10 cm  LA Vol s, MOD A4C: 98.85 ml.  LA Vol s, MOD A2C: 131.24 ml.  LA Vol s, MOD BP:  120.46 ml  89.36 ml/m²         Right Ventricle Measurements:     TAPSE: 1.7 cm       LVOT / RVOT/ Qp/Qs Data: (Indexed to BSA)  LVOT Diameter:  1.83 cm  LVOT Area:      2.63 cm²  LVOT Vmax:      1.00 m/s  LVOT VTI:       21.04 cm  LVOT peak grad: 4 mmHg  LVOT mean grad: 1.7 mmHg  LVOT SV:        55.3 ml  41.04 ml/m²    Aortic Valve Measurements:  AV Vmax:                2.5 m/s  AV Peak Gradient:       24.4 mmHg  AV Mean Gradient:       14.2 mmHg  AV VTI:                 53.6 cm  AV VTI Ratio:           0.39  AoV EOA, Contin:        1.03 cm²  AoV EOA, Contin i:      0.77 cm²/m²  AoV Dimensionless Index 0.39    Mitral Valve Measurements:     MV Vmax:       1.15 m/s  MV VTI (tips): 30.05 cm  MV Mean Grad:  2.3 mmHg  MV Peak Grad:  5.3 mmHg  MV E Vmax:     1.0 m/s  MV A Vmax:     1.1 m/s  MV E/A:        0.9       Tricuspid Valve Measurements:     TV S'             11.0 cm/s  TR Vmax:          3.0 m/s  TR Peak Gradient: 36.2 mmHg  RA Pressure:      8 mmHg  PASP:             44 mmHg    ________________________________________________________________________________________  Electronically signed on 11/20/2024 at 8:16:00 AM by Malgorzata Palumbo MD         *** Final ***

## 2024-11-20 NOTE — CONSULT NOTE ADULT - SUBJECTIVE AND OBJECTIVE BOX
Podiatry HPI: 89 year old male with a PMHx of COPD and prostate cancer (in remission s/p radiation) presented to the ED with a 2 day history of worsening SOB and confusion. Podiatry was consulted on floors for wounds on the lower extremities. Patient was awake, but remained unresponsive and daughter was not bedside at the time of consultation. No other pedal complaints, no constitutional symptoms noted besides SOB, LE swelling, and confusion.     HPI: Patient is a 89 y/o M with PMH COPD, and prostate cancer (in remission s/p radiation) who presented with 2 day history of worsening shortness of breath, and confusion. Patient was intubated at time of evaluation and collateral history was provided by Buffy Salinas (Daughter - on call). As per daughter patient had been complaining of worsening shortness of breath and difficulty breathing for 2 days. She denies any associated cough, phlegm, or fevers. She reports patient complained of worsening shortness of breath while lying down, and she did notice worsening LE swelling in the past few weeks. She reports patient was diagnosed with left sided pneumonia 3 weeks ago and completed course of Augmentin outpatient after which his symptoms resolved. As per the daughter patient has no history of cardiac disease, and has no history of stent placement, or MI. She reports patient is non-compliant with symbicort and albuterol inhaler, but is not on any other medication. She denies any other symptoms that patient complained about recently.   ED course: Patient was given ceftriaxone, azithromycin, and given 3L IV fluid. Paitent was also placed on BiPAP due to increased work of breathing. However, patient was not able to tolerate BiPAP and ripped it off. Patient was saturating 73% on room air at which time patient was intubated.       Medications:  albuterol/ipratropium for Nebulization 3 milliLiter(s) Nebulizer every 6 hours PRN  chlorhexidine 2% Cloths 1 Application(s) Topical <User Schedule>  DOBUTamine Infusion 2.5 MICROgram(s)/kG/Min IV Continuous <Continuous>  enoxaparin Injectable 30 milliGRAM(s) SubCutaneous every 24 hours  pantoprazole   Suspension 40 milliGRAM(s) Oral daily  propofol Infusion 10 MICROgram(s)/kG/Min IV Continuous <Continuous>    FHx:  No pertinent family history of first degree relatives    PMHx:   Prostate cancer  Chronic obstructive pulmonary disease    PSHx:  No past surgical history      Labs                          10.6   10.15 )-----------( 159      ( 20 Nov 2024 03:37 )             30.4      11-20    131[L]  |  98  |  28[H]  ----------------------------<  117[H]  3.6   |  26  |  0.85    Ca    8.4      20 Nov 2024 03:37  Phos  3.4     11-20  Mg     2.3     11-20    TPro  5.8[L]  /  Alb  3.0[L]  /  TBili  1.1  /  DBili  x   /  AST  118[H]  /  ALT  196[H]  /  AlkPhos  151[H]  11-20     Vital Signs Last 24 Hrs  T(C): 37.2 (20 Nov 2024 12:00), Max: 37.4 (19 Nov 2024 21:00)  T(F): 99 (20 Nov 2024 12:00), Max: 99.3 (19 Nov 2024 21:00)  HR: 82 (20 Nov 2024 12:21) (73 - 84)  BP: 101/53 (20 Nov 2024 12:00) (99/61 - 121/66)  BP(mean): 69 (20 Nov 2024 12:00) (64 - 81)  RR: 15 (20 Nov 2024 12:00) (12 - 19)  SpO2: 100% (20 Nov 2024 12:21) (99% - 100%)    Parameters below as of 20 Nov 2024 07:00  Patient On (Oxygen Delivery Method): ventilator    O2 Concentration (%): 40  WBC Count: 10.15 K/uL (11-20-24 @ 03:37)      LE FOCUSED PHYSICAL EXAM:  Vasc: DP/PT faintly palpable bilaterally. CFT <3 seconds x 10. Temp Gradient within normal limits bilaterally. No edema, no erythema, no varicosities observed bilaterally.   Derm: Nails are normotrophic and cut to a hygienic length x 10. Skin appears supple, well-hydrated, and intact bilaterally. No open lesions, no ulcerations, no purulence, no drainage, no soft tissue crepitus, no fluctuance, no IDM, no streaking, no signs of an acute/active infection. There is ecchymosis consistent with DTI from pressure on the lateral and posterior aspects of the left ankle.   Neuro: Protective sensation unable to be assessed due to AAO status  MSK: Muscle strength deferred. No pain elicited upon palpation bilaterally. No pain elicited upon ROM of pedal and ankle joints bilaterally. Negative calf tenderness bilaterally.

## 2024-11-20 NOTE — PROGRESS NOTE ADULT - ASSESSMENT
89 M from home with SOB. Hx of COPD, prostate CA s/p radiation. Treated for sepsis in ED, intubated for resp distress after failing bipap. EF 18%.

## 2024-11-20 NOTE — CONSULT NOTE ADULT - ASSESSMENT
A:   Pressure injury without open lesions on the left lower extremity    P:   Patient evaluated, chart reviewed and updated  Unable to order any radiologic / vascular studies as patient is in the ICU, but no real indication for studies as of now regardless - no trauma, pulses faintly palpable, no open lesions  Unable to discuss diagnosis and treatment plan with the patient as he is non-responsive  No dressings required, no open lesions. Recommend alleyvn pad for more padding if needed.   Patient to keep CAIR boots on at all times to offload areas of pressure  Patient is stable from podiatry's standpoint. No surgical intervention required at this time. Podiatry signing off.   Upon discharge, if able, patient may follow up outpatient with Dr. Lelia Loja at [phone] [place] [address]. Please call [phone] to make an appointment within 1 week.   Discussed with attending Dr. Loja A:   Pressure injury without open lesions on the left lower extremity  Difficulty ambulating       P:   Patient evaluated, chart reviewed and updated  Unable to order any radiologic / vascular studies as patient is in the ICU, but no indication for studies as of now regardless - no trauma, pulses faintly palpable, no open lesions  Unable to discuss diagnosis and treatment plan with the patient as he is non-responsive  No dressings required, no open lesions. Recommend alleyvn pad for more padding if needed.   Patient to keep CAIR boots on at all times to offload areas of pressure  Patient is stable from podiatry's standpoint. No surgical intervention required at this time. Podiatry signing off.   Upon discharge, if able, patient may follow up outpatient with Dr. Lelia Loja at [phone] [place] [address]. Please call [phone] to make an appointment within 1 week.   Discussed with attending Dr. Loja

## 2024-11-20 NOTE — PROGRESS NOTE ADULT - SUBJECTIVE AND OBJECTIVE BOX
PRESENTING CC:    OVERNIGHT EVENTS:    SUBJECTIVE:         ------------------------  PMH:   PAST MEDICAL & SURGICAL HISTORY:  Prostate cancer    Chronic obstructive pulmonary disease        Allergies    No Known Allergies    Intolerances        MEDICATIONS  (STANDING):  chlorhexidine 2% Cloths 1 Application(s) Topical <User Schedule>  DOBUTamine Infusion 2.5 MICROgram(s)/kG/Min (4.02 mL/Hr) IV Continuous <Continuous>  enoxaparin Injectable 30 milliGRAM(s) SubCutaneous every 24 hours  pantoprazole   Suspension 40 milliGRAM(s) Oral daily  propofol Infusion 10 MICROgram(s)/kG/Min (3.22 mL/Hr) IV Continuous <Continuous>    MEDICATIONS  (PRN):  albuterol/ipratropium for Nebulization 3 milliLiter(s) Nebulizer every 6 hours PRN Shortness of Breath and/or Wheezing      FAMILY HISTORY:    Reviewed; no change from my prior note    SOCIAL HISTORY:  Reviewed, no change from my prior note    REVIEW OF SYSTEMS:  Constitutional: [ ] fever, [ ]weight loss,  [ ]fatigue  Eyes: [ ] visual changes  Respiratory: [ ]shortness of breath;  [ ] cough, [ ]wheezing, [ ]chills, [ ]hemoptysis  Cardiovascular: [ ] chest pain, [ ]palpitations, [ ]dizziness,  [ ]leg swelling [ ]syncope  Gastrointestinal: [ ] abdominal pain, [ ]nausea, [ ]vomiting,  [ ]diarrhea   Genitourinary: [ ] dysuria, [ ] hematuria  Neurologic: [ ] headaches [ ] tremors [ ] weakness [ ] lightheadedness  Skin: [ ] itching, [ ]burning, [ ] rashes  Endocrine: [ ] heat or cold intolerance  Musculoskeletal: [ ] joint pain or swelling; [ ] muscle, back, or extremity pain  Psychiatric: [ ] depression, [ ]anxiety, [ ]mood swings, or [ ]difficulty sleeping  Hematologic: [ ] easy bruising, [ ] bleeding gums    [x] All remaining systems negative except as per above.   [  ] Unable to obtain    Vital Signs Last 24 Hrs  T(C): 37.6 (20 Nov 2024 15:00), Max: 37.6 (20 Nov 2024 14:00)  T(F): 99.7 (20 Nov 2024 15:00), Max: 99.7 (20 Nov 2024 14:00)  HR: 104 (20 Nov 2024 15:00) (73 - 104)  BP: 106/53 (20 Nov 2024 15:00) (101/53 - 121/66)  BP(mean): 68 (20 Nov 2024 15:00) (61 - 81)  RR: 16 (20 Nov 2024 15:00) (13 - 22)  SpO2: 97% (20 Nov 2024 15:00) (91% - 100%)    Parameters below as of 20 Nov 2024 12:26  Patient On (Oxygen Delivery Method): nasal cannula  O2 Flow (L/min): 2    I&O's Summary    19 Nov 2024 07:01  -  20 Nov 2024 07:00  --------------------------------------------------------  IN: 333.6 mL / OUT: 4530 mL / NET: -4196.4 mL    20 Nov 2024 07:01  -  20 Nov 2024 16:51  --------------------------------------------------------  IN: 349.6 mL / OUT: 2380 mL / NET: -2030.4 mL        PHYSICAL EXAM:  General: No acute distress  HEENT: EOMI  Neck: No JVD  Lungs: Clear to auscultation bilaterally; No rales or wheezing  Heart: Regular rate and rhythm; No murmurs, rubs, or gallops  Abdomen: Soft, non tender, non distended   Extremities: Warm, no edema   Nervous system:  Alert & Oriented X3  Psychiatric: Normal affect  Skin: No rashes or lesions    LABS:  11-20    131[L]  |  98  |  28[H]  ----------------------------<  117[H]  3.6   |  26  |  0.85    Ca    8.4      20 Nov 2024 03:37  Phos  3.4     11-20  Mg     2.3     11-20    TPro  5.8[L]  /  Alb  3.0[L]  /  TBili  1.1  /  DBili  x   /  AST  118[H]  /  ALT  196[H]  /  AlkPhos  151[H]  11-20    Creatinine Trend: 0.85<--, 1.06<--, 1.03<--, 1.02<--, 1.01<--, 1.06<--                        10.6   10.15 )-----------( 159      ( 20 Nov 2024 03:37 )             30.4     PT/INR - ( 19 Nov 2024 16:00 )   PT: 20.6 sec;   INR: 1.79 ratio         PTT - ( 19 Nov 2024 16:00 )  PTT:34.9 sec  Lipid Panel:   Cardiac Enzymes:         RADIOLOGY:    ECG [my interpretation]:    TELEMETRY:    ECHO:    STRESS TEST:    CATHETERIZATION:               PRESENTING CC: shortness of breath, altered mental status    OVERNIGHT EVENTS: Patient was extubated today      PMH:   PAST MEDICAL & SURGICAL HISTORY:  Prostate cancer    Chronic obstructive pulmonary disease        Allergies    No Known Allergies    Intolerances        MEDICATIONS  (STANDING):  chlorhexidine 2% Cloths 1 Application(s) Topical <User Schedule>  DOBUTamine Infusion 2.5 MICROgram(s)/kG/Min (4.02 mL/Hr) IV Continuous <Continuous>  enoxaparin Injectable 30 milliGRAM(s) SubCutaneous every 24 hours  pantoprazole   Suspension 40 milliGRAM(s) Oral daily  propofol Infusion 10 MICROgram(s)/kG/Min (3.22 mL/Hr) IV Continuous <Continuous>    MEDICATIONS  (PRN):  albuterol/ipratropium for Nebulization 3 milliLiter(s) Nebulizer every 6 hours PRN Shortness of Breath and/or Wheezing      FAMILY HISTORY:    Reviewed; no change from my prior note    SOCIAL HISTORY:  Reviewed, no change from my prior note    REVIEW OF SYSTEMS:  Constitutional: [ ] fever, [ ]weight loss,  [ ]fatigue  Eyes: [ ] visual changes  Respiratory: [ ]shortness of breath;  [ ] cough, [ ]wheezing, [ ]chills, [ ]hemoptysis  Cardiovascular: [ ] chest pain, [ ]palpitations, [ ]dizziness,  [ ]leg swelling [ ]syncope  Gastrointestinal: [ ] abdominal pain, [ ]nausea, [ ]vomiting,  [ ]diarrhea   Genitourinary: [ ] dysuria, [ ] hematuria  Neurologic: [ ] headaches [ ] tremors [ ] weakness [ ] lightheadedness  Skin: [ ] itching, [ ]burning, [ ] rashes  Endocrine: [ ] heat or cold intolerance  Musculoskeletal: [ ] joint pain or swelling; [ ] muscle, back, or extremity pain  Psychiatric: [ ] depression, [ ]anxiety, [ ]mood swings, or [ ]difficulty sleeping  Hematologic: [ ] easy bruising, [ ] bleeding gums    [x] All remaining systems negative except as per above.   [  ] Unable to obtain    Vital Signs Last 24 Hrs  T(C): 37.6 (20 Nov 2024 15:00), Max: 37.6 (20 Nov 2024 14:00)  T(F): 99.7 (20 Nov 2024 15:00), Max: 99.7 (20 Nov 2024 14:00)  HR: 104 (20 Nov 2024 15:00) (73 - 104)  BP: 106/53 (20 Nov 2024 15:00) (101/53 - 121/66)  BP(mean): 68 (20 Nov 2024 15:00) (61 - 81)  RR: 16 (20 Nov 2024 15:00) (13 - 22)  SpO2: 97% (20 Nov 2024 15:00) (91% - 100%)    Parameters below as of 20 Nov 2024 12:26  Patient On (Oxygen Delivery Method): nasal cannula  O2 Flow (L/min): 2    I&O's Summary    19 Nov 2024 07:01  -  20 Nov 2024 07:00  --------------------------------------------------------  IN: 333.6 mL / OUT: 4530 mL / NET: -4196.4 mL    20 Nov 2024 07:01  -  20 Nov 2024 16:51  --------------------------------------------------------  IN: 349.6 mL / OUT: 2380 mL / NET: -2030.4 mL        PHYSICAL EXAM:  GENERAL: elderly, cachetic appearing male  EYES: PERRL  NECK: Supple, No JVD; Trachea midline  NERVOUS SYSTEM: Alert, awake x1  CHEST/LUNG:  breath sounds present and crackles bilaterally, No rales, rhonchi, wheezing  HEART: Regular rate and rhythm; No murmurs, rubs, or gallops  ABDOMEN: Soft, Nondistended; Bowel sounds present, no pain or masses on palpation  : voiding well, Hilliard in place  EXTREMITIES:  2+ Peripheral Pulses, +1 b/l lower ext    LABS  11-20    131[L]  |  98  |  28[H]  ----------------------------<  117[H]  3.6   |  26  |  0.85    Ca    8.4      20 Nov 2024 03:37  Phos  3.4     11-20  Mg     2.3     11-20    TPro  5.8[L]  /  Alb  3.0[L]  /  TBili  1.1  /  DBili  x   /  AST  118[H]  /  ALT  196[H]  /  AlkPhos  151[H]  11-20    Creatinine Trend: 0.85<--, 1.06<--, 1.03<--, 1.02<--, 1.01<--, 1.06<--                        10.6   10.15 )-----------( 159      ( 20 Nov 2024 03:37 )             30.4     PT/INR - ( 19 Nov 2024 16:00 )   PT: 20.6 sec;   INR: 1.79 ratio         PTT - ( 19 Nov 2024 16:00 )  PTT:34.9 sec  Lipid Panel:   Cardiac Enzymes:         RADIOLOGY: < from: Xray Chest 1 View- PORTABLE-Urgent (Xray Chest 1 View- PORTABLE-Urgent .) (11.19.24 @ 12:59) >  AP chest. Prior 11/18/2024.    IMPRESSION: Nasogastric tube tip is excluded on the film but can be   traced to below the level of the gastroesophageal junction. Endotracheal   tube reidentified in position. No gross change heart and lungs.    --- End of Report ---        < end of copied text >  < from: CT Angio Chest PE Protocol w/ IV Cont (11.18.24 @ 21:15) >  IMPRESSION:  Limited evaluation for pulmonary embolus within the right segmental and   subsegmental pulmonary arteries due to mixing of contrast. There is no   pulmonary embolus seen within the remaining opacified pulmonary arterial   tree.    Cardiomegaly and Bilateral pleural effusions, left greater than right.   Suggestive of cardiogenic failure.    Reflux of contrast into the hepatic veins and intrahepatic IVC may   suggest right-sided heart dysfunction. Correlate clinically.     Heterogeneous hepatic enhancement which may be related to congestion or   underlying hepatic dysfunction. Correlate clinically.    Apparent wall thickening of the urinary bladder which may be related   sequelae of chronic outlet obstruction from prostate enlargement or   cystitis. Correlate clinically.    Question diffuse wall thickening of the stomach and small bowel.   Correlate clinically for diffuse gastroenteritis. This may be of   infectious or inflammatory etiology, the diffuse congestion or ischemic   bowel disease may also be considered. Correlate clinically.    Cholelithiasis. Gallbladder wall thickening versus pericholecystic fluid   is nonspecific in the setting of generalized edema.    Multiple soft tissue lipomas noted. Question of lipomatous lesion versus   fluid collection in the left supraclavicular region. Artifact limits   evaluation is region.    Focal bronchiectasis versus thick-walled cyst of the left lower lobe and   focal bronchiectasis with associated wall thickening of the right lower   lobe. This may be infectious or inflammatory in nature. Follow-up to   assess resolution.    9 mm left lower lobe pulmonary nodule. Single nodule larger than 8mm   should be followed by CT, PET/CT or tissue sampling at 3 months. --   ?Reference: Guidelines for Management of Incidental Pulmonary Nodules   Detected on CT Images: From the Fleischner Society 2017?    Additional findings as above.    --- End of Report ---    < end of copied text >  < from: Xray Chest 1 View- PORTABLE-Urgent (Xray Chest 1 View- PORTABLE-Urgent .) (11.18.24 @ 20:48) >    IMPRESSION: Status post intubation. Small bilateral pleural effusions.   Cardiomegaly.    --- End of Report ---    < end of copied text >  < from: Xray Chest 1 View- PORTABLE-Urgent (11.18.24 @ 18:30) >  IMPRESSION: Status post intubation. Small bilateral pleural effusions.   Cardiomegaly.    --- End of Report ---    < end of copied text >      ECG:  < from: 12 Lead ECG (11.19.24 @ 00:50) >  Diagnosis Line Sinus rhythm with occasional Premature ventricular complexes HR 84  Possible Left atrial enlargement  Left bundle branch block  Abnormal ECG    Confirmed by ASHANTI BLACKMON, [facility] ([id number]) on 11/20/2024 12:33:29 PM    < end of copied text >    TELEMETRY: NSR, with NSVTs HR range     ECHO:< from: TTE W or WO Ultrasound Enhancing Agent (11.19.24 @ 14:57) >  CONCLUSIONS:      1. Left ventricular cavity is mildly dilated. Left ventricular wall thickness is normal. Left ventricular systolic function is severely decreased with an ejection fraction of 18 % by Mejias's method of disks. Global left ventricular hypokinesis.   2. There is moderate (grade 2) left ventricular diastolic dysfunction.   3. Normal right ventricular cavity size, with normal wall thickness, and normal right ventricular systolic function. Tricuspid annular plane systolic excursion (TAPSE) is 1.7 cm (normal >=1.7 cm). Tricuspid annular tissue Doppler S' is 11.0 cm/s (normal >10 cm/s).   4. Left atrium is severely dilated.   5. Moderate mitral regurgitation.   6. Mild tricuspidregurgitation.   7. Estimated pulmonary artery systolic pressure is 44 mmHg, consistent with mild pulmonary hypertension.   8. The inferior vena cava is normal in size measuring 1.90 cm in diameter, (normal <2.1cm) with abnormal inspiratory collapse (abnormal <50%) consistent with mildly elevated right atrial pressure (~8, range 5-10mmHg).   9. No pericardial effusion seen.  10. Bilateral pleural effusion noted.  11. No prior echocardiogram is available for comparison.    ________________________________________________________________________________________    < end of copied text >

## 2024-11-20 NOTE — PROGRESS NOTE ADULT - SUBJECTIVE AND OBJECTIVE BOX
INTERVAL HPI/OVERNIGHT EVENTS:       PRESSORS: [ ] YES [ ] NO  WHICH:    ANTIBIOTICS:                  DATE STARTED:  ANTIBIOTICS:                  DATE STARTED:    Antimicrobial:    Cardiovascular:  DOBUTamine Infusion 2.5 MICROgram(s)/kG/Min IV Continuous <Continuous>    Pulmonary:  albuterol/ipratropium for Nebulization 3 milliLiter(s) Nebulizer every 6 hours PRN    Hematalogic:  enoxaparin Injectable 30 milliGRAM(s) SubCutaneous every 24 hours    Other:  chlorhexidine 0.12% Liquid 15 milliLiter(s) Oral Mucosa every 12 hours  chlorhexidine 2% Cloths 1 Application(s) Topical <User Schedule>  pantoprazole   Suspension 40 milliGRAM(s) Oral daily  propofol Infusion 10 MICROgram(s)/kG/Min IV Continuous <Continuous>    albuterol/ipratropium for Nebulization 3 milliLiter(s) Nebulizer every 6 hours PRN  chlorhexidine 0.12% Liquid 15 milliLiter(s) Oral Mucosa every 12 hours  chlorhexidine 2% Cloths 1 Application(s) Topical <User Schedule>  DOBUTamine Infusion 2.5 MICROgram(s)/kG/Min IV Continuous <Continuous>  enoxaparin Injectable 30 milliGRAM(s) SubCutaneous every 24 hours  pantoprazole   Suspension 40 milliGRAM(s) Oral daily  propofol Infusion 10 MICROgram(s)/kG/Min IV Continuous <Continuous>    Drug Dosing Weight  Height (cm): 154.9 (18 Nov 2024 16:15)  Weight (kg): 53.6 (18 Nov 2024 23:43)  BMI (kg/m2): 22.3 (18 Nov 2024 23:43)  BSA (m2): 1.51 (18 Nov 2024 23:43)    PHYSICAL EXAM:  GENERAL: elderly, cachetic appearing male, intubated, sedated laying in bed   EYES: PERRL  NECK: Supple, No JVD; Trachea midline  NERVOUS SYSTEM:  sedated, withdraws to pain in B/L upper and lower extremities, not following commands, +cough and gag  CHEST/LUNG:  breath sounds present and crackles bilaterally, No rales, rhonchi, wheezing  HEART: Regular rate and rhythm; No murmurs, rubs, or gallops  ABDOMEN: Soft, Nondistended; Bowel sounds present, no pain or masses on palpation  : voiding well, Roth in place  EXTREMITIES:  2+ Peripheral Pulses, +4 b/l lower ext    LINES/DRAINS/DEVICES  CENTRAL LINE: [ ] YES [X] NO  LOCATION:     ROTH: [X] YES [ ] NO     A-LINE:  [ ] YES [X] NO  LOCATION:       ICU Vital Signs Last 24 Hrs  T(C): 37.2 (20 Nov 2024 12:00), Max: 37.4 (19 Nov 2024 21:00)  T(F): 99 (20 Nov 2024 12:00), Max: 99.3 (19 Nov 2024 21:00)  HR: 83 (20 Nov 2024 12:00) (72 - 84)  BP: 101/53 (20 Nov 2024 12:00) (99/61 - 121/66)  BP(mean): 69 (20 Nov 2024 12:00) (64 - 81)  ABP: --  ABP(mean): --  RR: 15 (20 Nov 2024 12:00) (12 - 19)  SpO2: 100% (20 Nov 2024 12:00) (99% - 100%)    O2 Parameters below as of 20 Nov 2024 07:00  Patient On (Oxygen Delivery Method): ventilator    O2 Concentration (%): 40      ABG - ( 20 Nov 2024 11:25 )  pH, Arterial: 7.52  pH, Blood: x     /  pCO2: 34    /  pO2: 123   / HCO3: 28    / Base Excess: 5.1   /  SaO2: 99            11-19 @ 07:01  -  11-20 @ 07:00  --------------------------------------------------------  IN: 333.6 mL / OUT: 4530 mL / NET: -4196.4 mL      Mode: AC/ CMV (Assist Control/ Continuous Mandatory Ventilation)  RR (machine): 14  TV (machine): 400  FiO2: 40  PEEP: 5  ITime: 0.9  MAP: 8  PIP: 19        LABS:  CBC Full  -  ( 20 Nov 2024 03:37 )  WBC Count : 10.15 K/uL  RBC Count : 3.87 M/uL  Hemoglobin : 10.6 g/dL  Hematocrit : 30.4 %  Platelet Count - Automated : 159 K/uL  Mean Cell Volume : 78.6 fl  Mean Cell Hemoglobin : 27.4 pg  Mean Cell Hemoglobin Concentration : 34.9 g/dL  Auto Neutrophil # : x  Auto Lymphocyte # : x  Auto Monocyte # : x  Auto Eosinophil # : x  Auto Basophil # : x  Auto Neutrophil % : x  Auto Lymphocyte % : x  Auto Monocyte % : x  Auto Eosinophil % : x  Auto Basophil % : x    11-20    131[L]  |  98  |  28[H]  ----------------------------<  117[H]  3.6   |  26  |  0.85    Ca    8.4      20 Nov 2024 03:37  Phos  3.4     11-20  Mg     2.3     11-20    TPro  5.8[L]  /  Alb  3.0[L]  /  TBili  1.1  /  DBili  x   /  AST  118[H]  /  ALT  196[H]  /  AlkPhos  151[H]  11-20    PT/INR - ( 19 Nov 2024 16:00 )   PT: 20.6 sec;   INR: 1.79 ratio         PTT - ( 19 Nov 2024 16:00 )  PTT:34.9 sec  Urinalysis Basic - ( 20 Nov 2024 03:37 )    Color: x / Appearance: x / SG: x / pH: x  Gluc: 117 mg/dL / Ketone: x  / Bili: x / Urobili: x   Blood: x / Protein: x / Nitrite: x   Leuk Esterase: x / RBC: x / WBC x   Sq Epi: x / Non Sq Epi: x / Bacteria: x      Culture Results:   No growth at 24 hours (11-18 @ 17:10)  Culture Results:   No growth at 24 hours (11-18 @ 17:00)      RADIOLOGY & ADDITIONAL STUDIES REVIEWED DURING TEAM ROUNDS       INTERVAL HPI/OVERNIGHT EVENTS:   Patient remained on Dobutamine overnight, net neg 4L. This AM patient assessed at bedside and following commands appropriately so placed on S 5/5.     Antimicrobial:    Cardiovascular:  DOBUTamine Infusion 2.5 MICROgram(s)/kG/Min IV Continuous <Continuous>    Pulmonary:  albuterol/ipratropium for Nebulization 3 milliLiter(s) Nebulizer every 6 hours PRN    Hematalogic:  enoxaparin Injectable 30 milliGRAM(s) SubCutaneous every 24 hours    Other:  chlorhexidine 0.12% Liquid 15 milliLiter(s) Oral Mucosa every 12 hours  chlorhexidine 2% Cloths 1 Application(s) Topical <User Schedule>  pantoprazole   Suspension 40 milliGRAM(s) Oral daily  propofol Infusion 10 MICROgram(s)/kG/Min IV Continuous <Continuous>    albuterol/ipratropium for Nebulization 3 milliLiter(s) Nebulizer every 6 hours PRN  chlorhexidine 0.12% Liquid 15 milliLiter(s) Oral Mucosa every 12 hours  chlorhexidine 2% Cloths 1 Application(s) Topical <User Schedule>  DOBUTamine Infusion 2.5 MICROgram(s)/kG/Min IV Continuous <Continuous>  enoxaparin Injectable 30 milliGRAM(s) SubCutaneous every 24 hours  pantoprazole   Suspension 40 milliGRAM(s) Oral daily  propofol Infusion 10 MICROgram(s)/kG/Min IV Continuous <Continuous>    Drug Dosing Weight  Height (cm): 154.9 (18 Nov 2024 16:15)  Weight (kg): 53.6 (18 Nov 2024 23:43)  BMI (kg/m2): 22.3 (18 Nov 2024 23:43)  BSA (m2): 1.51 (18 Nov 2024 23:43)    PHYSICAL EXAM:  GENERAL: elderly, cachetic appearing male, intubated, sedated laying in bed   EYES: PERRL  NECK: Supple, No JVD; Trachea midline  NERVOUS SYSTEM:  sedated, withdraws to pain in B/L upper and lower extremities, not following commands, +cough and gag  CHEST/LUNG:  breath sounds present and crackles bilaterally, No rales, rhonchi, wheezing  HEART: Regular rate and rhythm; No murmurs, rubs, or gallops  ABDOMEN: Soft, Nondistended; Bowel sounds present, no pain or masses on palpation  : voiding well, Roth in place  EXTREMITIES:  2+ Peripheral Pulses, +4 b/l lower ext    LINES/DRAINS/DEVICES  CENTRAL LINE: [ ] YES [X] NO  LOCATION:     ROTH: [X] YES [ ] NO     A-LINE:  [ ] YES [X] NO  LOCATION:       ICU Vital Signs Last 24 Hrs  T(C): 37.2 (20 Nov 2024 12:00), Max: 37.4 (19 Nov 2024 21:00)  T(F): 99 (20 Nov 2024 12:00), Max: 99.3 (19 Nov 2024 21:00)  HR: 83 (20 Nov 2024 12:00) (72 - 84)  BP: 101/53 (20 Nov 2024 12:00) (99/61 - 121/66)  BP(mean): 69 (20 Nov 2024 12:00) (64 - 81)  ABP: --  ABP(mean): --  RR: 15 (20 Nov 2024 12:00) (12 - 19)  SpO2: 100% (20 Nov 2024 12:00) (99% - 100%)    O2 Parameters below as of 20 Nov 2024 07:00  Patient On (Oxygen Delivery Method): ventilator    O2 Concentration (%): 40      ABG - ( 20 Nov 2024 11:25 )  pH, Arterial: 7.52  pH, Blood: x     /  pCO2: 34    /  pO2: 123   / HCO3: 28    / Base Excess: 5.1   /  SaO2: 99            11-19 @ 07:01  -  11-20 @ 07:00  --------------------------------------------------------  IN: 333.6 mL / OUT: 4530 mL / NET: -4196.4 mL      Mode: AC/ CMV (Assist Control/ Continuous Mandatory Ventilation)  RR (machine): 14  TV (machine): 400  FiO2: 40  PEEP: 5  ITime: 0.9  MAP: 8  PIP: 19        LABS:  CBC Full  -  ( 20 Nov 2024 03:37 )  WBC Count : 10.15 K/uL  RBC Count : 3.87 M/uL  Hemoglobin : 10.6 g/dL  Hematocrit : 30.4 %  Platelet Count - Automated : 159 K/uL  Mean Cell Volume : 78.6 fl  Mean Cell Hemoglobin : 27.4 pg  Mean Cell Hemoglobin Concentration : 34.9 g/dL  Auto Neutrophil # : x  Auto Lymphocyte # : x  Auto Monocyte # : x  Auto Eosinophil # : x  Auto Basophil # : x  Auto Neutrophil % : x  Auto Lymphocyte % : x  Auto Monocyte % : x  Auto Eosinophil % : x  Auto Basophil % : x    11-20    131[L]  |  98  |  28[H]  ----------------------------<  117[H]  3.6   |  26  |  0.85    Ca    8.4      20 Nov 2024 03:37  Phos  3.4     11-20  Mg     2.3     11-20    TPro  5.8[L]  /  Alb  3.0[L]  /  TBili  1.1  /  DBili  x   /  AST  118[H]  /  ALT  196[H]  /  AlkPhos  151[H]  11-20    PT/INR - ( 19 Nov 2024 16:00 )   PT: 20.6 sec;   INR: 1.79 ratio         PTT - ( 19 Nov 2024 16:00 )  PTT:34.9 sec  Urinalysis Basic - ( 20 Nov 2024 03:37 )    Color: x / Appearance: x / SG: x / pH: x  Gluc: 117 mg/dL / Ketone: x  / Bili: x / Urobili: x   Blood: x / Protein: x / Nitrite: x   Leuk Esterase: x / RBC: x / WBC x   Sq Epi: x / Non Sq Epi: x / Bacteria: x      Culture Results:   No growth at 24 hours (11-18 @ 17:10)  Culture Results:   No growth at 24 hours (11-18 @ 17:00)      RADIOLOGY & ADDITIONAL STUDIES REVIEWED DURING TEAM ROUNDS

## 2024-11-20 NOTE — PROGRESS NOTE ADULT - PROBLEM SELECTOR PLAN 1
-secondary to acutely decompensated HFrEF  - unclear etiology. No prior cardiac workup.   Evidence of end organ dysfunction with transaminitis, and lactic acidosis.   ECHO with LVEF 18 % and global left ventricular hypokinesis.  Lactate improved on inotrope therapy. Continue dobutamine as tolerated  Continue IV diuresis with goal net negative 1-2 liters/24 hours.

## 2024-11-20 NOTE — PROGRESS NOTE ADULT - ASSESSMENT
Patient is a 89 y/o M with PMH COPD, and prostate cancer (in remission s/p radiation) who presented with 2 day history of worsening shortness of breath, and confusion. As per daughter patient had been complaining of worsening shortness of breath and difficulty breathing for 2 days associated with LE swelling, and orthopnea. In the ED patient was given ceftriaxone, azithromycin, and given 3L IV fluid. Bedside POCUS revealed b/l B-lines, significant dilated cardiomyopathy and poor contractility. Patient is admitted for AHRF likely 2/2 to acute systolic HF.     #AHRF  #New onset acute systolic HF.   #elevated lactate  #COPD  #Cholelithiasis  #Transaminitis  #Hyponatremia    Plan:   NEURO:  #sedation  - will defer SAT today given patient is being actively diuresed  - plan for SAT/SBT in AM    CARDIO:  #New onset acute systolic HF  - P/w shortness of breath, hypoxia, LE swelling. Family reports no known history of HF, but shares 4 week history of LE edema   - Pro-BNP 11/18 10,828  - Trop 11/18 PEAKED AT 60.6.   - EKG - NSR w/ left bundle branch block and PVCs.   - POCUS at bedside revealed B-lines, significantly dilated cardiomyopathy, severely reduced LV systolic function, and small Left sided pleural effusion  - decrease lasix to 40mg IVP QD   - continue dobutamine 2.5mg to augment diuresis  - Daily weights and  Strict I&Os.   - TTE 11/20 Left ventricular systolic function is severely decreased with a calculated ejection fraction of 18 % by the Mejias's biplane method of disks. There is global left ventricular hypokinesis. There is moderate (grade 2) left ventricular diastolic dysfunction.  - Cardiology consulted, Panchito BOOGIE:  #AHRF 2/2 to Acute CHF vs PNA.   - P/w shortness of breath, hypoxia, LE swelling.   - requiring mechanical ventilation, vent adjusted per ABG: now 14/450/5/40%  - CTA chest: No PE, Cardiomegaly and Bilateral pleural effusions, left greater than right. Suggestive of cardiogenic failure. Reflux of contrast into the hepatic veins and intrahepatic IVC may suggest right-sided heart dysfunction. Focal bronchiectasis versus thick-walled cyst of the left lower lobe and focal bronchiectasis with associated wall thickening of the right lower lobe. This may be infectious or inflammatory in nature.   - WBC 7.9, afebrile, without evidence of infection,   - will discontinue ceftriaxone and azithromycin, hypoxia likely related to new onset HF    - F/U sputum Cx.   - F/u Bcx  - F/U Legionella, strep, mycoplasma, MRSA      #COPD  - on home symbicort 80 mcg-4.5mcg 2 puffs Q12 and albuterol 1.25mg q6  - continue symbicort 2 puff daily  - will switch to duoneb q6 PRN for SOB/wheezing    GI:  #Cholelithiasis   -  CT A/P 11/18 : Cholelithiasis. Gallbladder wall thickening versus pericholecystic fluid is nonspecific in the setting of generalized edema.  - patient with benign abdominal exam, no cholestatic pattern on LFTs, no evidence of infection,   - will obtain collateral when patient able      #Transaminitis Likely 2/2 to hepatic congestion in the setting of acute decompensated CHF  - P/w ALP - 182, ALP - 151, AST - 200  - CT A/P 11/18: - Heterogeneous hepatic enhancement which may be related to congestion or underlying hepatic dysfunction. Correlate clinically.  - Trend LFTs daily    #nutrition  - NPO  - NGT placed  - will begin tube feeds once NGT confirmed by xray     RENAL:  #Hypervolemic hyponatremia 2/2 systolic heart failure  - Na 123 on admission  - s/p 1L NS and lasix 40mg IV in ED  - continue furosemide 40mg IV BID  - Do not ovecorrect > 8mEq in  24 hours  - F/U BMP @ 2PM      #elevated lactate 2/2 new onset acute systolic heart failure  - P/w lactate 5.3>9.1>6.4>4.9>3.5  - repeat lactate 2pm    INFECTIOUS:  #PNA  - See above.   -     HEMO:  #normocytic anemia  - Hgb 12.0 on admission, now 11.1  - likely in setting of critical illness  - monitor CBC daily  - transfuse for Hgb goal >7    #elevated INR  - INR 2.5 on admission  - possibly due to hepatic congestion 2/2 heart failure  - monitor for s/s bleeding  - repeat coags @ 2pm      ENDO:  #no active issues    -    MSK/SKIN:  #no active issues         PPX:  - DVT: lovenox  - GI: PPI         Dispo:  ICU  Full code.      Patient is a 89 y/o M with PMH COPD, and prostate cancer (in remission s/p radiation) who presented with 2 day history of worsening shortness of breath, and confusion. As per daughter patient had been complaining of worsening shortness of breath and difficulty breathing for 2 days associated with LE swelling, and orthopnea. In the ED patient was given ceftriaxone, azithromycin, and given 3L IV fluid. Bedside POCUS revealed b/l B-lines, significant dilated cardiomyopathy and poor contractility. Patient is admitted for AHRF likely 2/2 to acute systolic HF.     #AHRF  #New onset acute systolic HF.   #elevated lactate  #COPD  #Cholelithiasis  #Transaminitis  #Hyponatremia    Plan:   NEURO:  #sedation  - will defer SAT today given patient is being actively diuresed  - plan for SAT/SBT in AM    CARDIO:  #New onset acute systolic HF  - P/w shortness of breath, hypoxia, LE swelling. Family reports no known history of HF, but shares 4 week history of LE edema   - Pro-BNP 11/18 10,828  - Trop 11/18 PEAKED AT 60.6.   - EKG - NSR w/ left bundle branch block and PVCs.   - POCUS at bedside revealed B-lines, significantly dilated cardiomyopathy, severely reduced LV systolic function, and small Left sided pleural effusion  - decrease lasix to 40mg IVP QD   - continue dobutamine 2.5mg to augment diuresis  - TTE 11/20 Left ventricular systolic function is severely decreased with a calculated ejection fraction of 18 % by the Mejias's biplane method of disks. There is global left ventricular hypokinesis. There is moderate (grade 2) left ventricular diastolic dysfunction.  - Cardiology consulted, Panchito recommending continuing medical management.     PULM:  #AHRF 2/2 to Acute CHF   - P/w shortness of breath, hypoxia, LE swelling.   - requiring mechanical ventilation, vent adjusted per ABG: now 14/450/5/40%  - CTA chest: No PE, Cardiomegaly and Bilateral pleural effusions, left greater than right. Suggestive of cardiogenic failure. Reflux of contrast into the hepatic veins and intrahepatic IVC may suggest right-sided heart dysfunction. Focal bronchiectasis versus thick-walled cyst of the left lower lobe and focal bronchiectasis with associated wall thickening of the right lower lobe. This may be infectious or inflammatory in nature.   - extubated after SBT to nasal cannula    #COPD  - on home symbicort 80 mcg-4.5mcg 2 puffs Q12 and albuterol 1.25mg q6  - continue symbicort 2 puff daily  - will switch to duoneb q6 PRN for SOB/wheezing    GI:  #Cholelithiasis   -  CT A/P 11/18 : Cholelithiasis. Gallbladder wall thickening versus pericholecystic fluid is nonspecific in the setting of generalized edema.  - patient with benign abdominal exam, no cholestatic pattern on LFTs, no evidence of infection,   - will obtain collateral when patient able    #Transaminitis Likely 2/2 to hepatic congestion in the setting of acute decompensated CHF  - P/w ALP - 182, ALP - 151, AST - 200  - CT A/P 11/18: - Heterogeneous hepatic enhancement which may be related to congestion or underlying hepatic dysfunction. Correlate clinically.  - Trend LFTs daily    #nutrition  - NPO  - bedside swallow after extubation, and advance diet as able     RENAL:  #Hypervolemic hyponatremia 2/2 systolic heart failure  - Na 123 on admission now 131  - continue furosemide 40mg IV qd  - Stict I&O      #elevated lactate 2/2 new onset acute systolic heart failure  - P/w lactate 5.3>9.1>6.4>4.9>3.5  - repeat lactate 2pm    INFECTIOUS:  #No active issues  - WBC 7.9, afebrile, without evidence of infection,   - BCx NTD  - atypical w/u neg   - will discontinue ceftriaxone and azithromycin, hypoxia likely related to new onset HF    - F/U sputum Cx.     HEMO:  #normocytic anemia  - Hgb 12.0 on admission, now 11.1  - likely in setting of critical illness  - monitor CBC daily  - transfuse for Hgb goal >7    #elevated INR  - INR 2.5 on admission > 1.7  - possibly due to hepatic congestion 2/2 heart failure  - monitor for s/s bleeding    ENDO:  #no active issues (A1c 5.6)    MSK/SKIN:  # Lower extremity DTI  - Podiatry consulted    PPX:  - DVT: lovenox  - GI: PPI       Dispo:  ICU  Full code.

## 2024-11-21 NOTE — PROGRESS NOTE ADULT - PROBLEM SELECTOR PLAN 1
Resp failure on this admission may reflect infection vs CHF vs COPD exacerbation  Now main component is CHF. Being diuresed. Now off dobutamine  guarded prognosis.

## 2024-11-21 NOTE — PROGRESS NOTE ADULT - ASSESSMENT
Patient is a 89 y/o M with PMH COPD, and prostate cancer (in remission s/p radiation) who presented with 2 day history of worsening shortness of breath, and confusion. As per daughter patient had been complaining of worsening shortness of breath and difficulty breathing for 2 days associated with LE swelling, and orthopnea. In the ED patient was given ceftriaxone, azithromycin, and given 3L IV fluid. Bedside POCUS revealed b/l B-lines, significant dilated cardiomyopathy and poor contractility. Patient is admitted for AHRF likely 2/2 to acute systolic HF.     #AHRF  #New onset acute systolic HF.   #elevated lactate  #COPD  #Cholelithiasis  #Transaminitis  #Hyponatremia    Plan:   NEURO:  #sedation  - will defer SAT today given patient is being actively diuresed  - plan for SAT/SBT in AM    CARDIO:  #New onset acute systolic HF  - P/w shortness of breath, hypoxia, LE swelling. Family reports no known history of HF, but shares 4 week history of LE edema   - Pro-BNP 11/18 10,828  - Trop 11/18 PEAKED AT 60.6.   - EKG - NSR w/ left bundle branch block and PVCs.   - POCUS at bedside revealed B-lines, significantly dilated cardiomyopathy, severely reduced LV systolic function, and small Left sided pleural effusion  - decrease lasix to 40mg IVP QD   - continue dobutamine 2.5mg to augment diuresis  - TTE 11/20 Left ventricular systolic function is severely decreased with a calculated ejection fraction of 18 % by the Mejias's biplane method of disks. There is global left ventricular hypokinesis. There is moderate (grade 2) left ventricular diastolic dysfunction.  - Cardiology consulted, Panchito recommending continuing medical management.     PULM:  #AHRF 2/2 to Acute CHF   - P/w shortness of breath, hypoxia, LE swelling.   - requiring mechanical ventilation, vent adjusted per ABG: now 14/450/5/40%  - CTA chest: No PE, Cardiomegaly and Bilateral pleural effusions, left greater than right. Suggestive of cardiogenic failure. Reflux of contrast into the hepatic veins and intrahepatic IVC may suggest right-sided heart dysfunction. Focal bronchiectasis versus thick-walled cyst of the left lower lobe and focal bronchiectasis with associated wall thickening of the right lower lobe. This may be infectious or inflammatory in nature.   - extubated after SBT to nasal cannula    #COPD  - on home symbicort 80 mcg-4.5mcg 2 puffs Q12 and albuterol 1.25mg q6  - continue symbicort 2 puff daily  - will switch to duoneb q6 PRN for SOB/wheezing    GI:  #Cholelithiasis   -  CT A/P 11/18 : Cholelithiasis. Gallbladder wall thickening versus pericholecystic fluid is nonspecific in the setting of generalized edema.  - patient with benign abdominal exam, no cholestatic pattern on LFTs, no evidence of infection,   - will obtain collateral when patient able    #Transaminitis Likely 2/2 to hepatic congestion in the setting of acute decompensated CHF  - P/w ALP - 182, ALP - 151, AST - 200  - CT A/P 11/18: - Heterogeneous hepatic enhancement which may be related to congestion or underlying hepatic dysfunction. Correlate clinically.  - Trend LFTs daily    #nutrition  - NPO  - bedside swallow after extubation, and advance diet as able     RENAL:  #Hypervolemic hyponatremia 2/2 systolic heart failure  - Na 123 on admission now 131  - continue furosemide 40mg IV qd  - Stict I&O      #elevated lactate 2/2 new onset acute systolic heart failure  - P/w lactate 5.3>9.1>6.4>4.9>3.5  - repeat lactate 2pm    INFECTIOUS:  #No active issues  - WBC 7.9, afebrile, without evidence of infection,   - BCx NTD  - atypical w/u neg   - will discontinue ceftriaxone and azithromycin, hypoxia likely related to new onset HF    - F/U sputum Cx.     HEMO:  #normocytic anemia  - Hgb 12.0 on admission, now 11.1  - likely in setting of critical illness  - monitor CBC daily  - transfuse for Hgb goal >7    #elevated INR  - INR 2.5 on admission > 1.7  - possibly due to hepatic congestion 2/2 heart failure  - monitor for s/s bleeding    ENDO:  #no active issues (A1c 5.6)    MSK/SKIN:  # Lower extremity DTI  - Podiatry consulted    PPX:  - DVT: lovenox  - GI: PPI       Dispo:  ICU  Full code.      Patient is a 89 y/o M with PMH COPD, and prostate cancer (in remission s/p radiation) who presented with 2 day history of worsening shortness of breath, and confusion. As per daughter patient had been complaining of worsening shortness of breath and difficulty breathing for 2 days associated with LE swelling, and orthopnea. In the ED patient was given ceftriaxone, azithromycin, and given 3L IV fluid. Bedside POCUS revealed b/l B-lines, significant dilated cardiomyopathy and poor contractility. Patient is admitted for AHRF likely 2/2 to acute systolic HF.     #AHRF  #New onset acute systolic HF.   #elevated lactate  #COPD  #Cholelithiasis  #Transaminitis  #Hyponatremia    Plan:   NEURO:  #No active issues  - Extubated to nasal cannula    - GOC discussion held with patient and family by Palliative Dr. Smith, patient now DNR/DNI with plan for home hospice on discharge.     CARDIO:  #New onset acute systolic HF  - P/w shortness of breath, hypoxia, LE swelling. Family reports no known history of HF, but shares 4 week history of LE edema   - Pro-BNP  10,828  - Trop  PEAKED AT 60.6.   - EKG - NSR w/ left bundle branch block and PVCs.   - Bedside POCUS : VTI 12,  held and VTI repeated after 1 hour and remains unchanged  - continue lasix to 40mg IVP QD   - Monitor off dobutamine 2.5mg  - TTE  Left ventricular systolic function is severely decreased with a calculated ejection fraction of 18 % by the Mejias's biplane method of disks. There is global left ventricular hypokinesis. There is moderate (grade 2) left ventricular diastolic dysfunction.  - Cardiology consulted, Panchito recommending continuing medical management.     PULM:  #AHRF 2/2 to Acute CHF   - P/w shortness of breath, hypoxia, LE swelling.   - CTA chest: No PE, Cardiomegaly and Bilateral pleural effusions, left greater than right. Suggestive of cardiogenic failure. Reflux of contrast into the hepatic veins and intrahepatic IVC may suggest right-sided heart dysfunction. Focal bronchiectasis versus thick-walled cyst of the left lower lobe and focal bronchiectasis with associated wall thickening of the right lower lobe. This may be infectious or inflammatory in nature.   - extubated after SBT to nasal cannula    #COPD  - on home symbicort 80 mcg-4.5mcg 2 puffs Q12 and albuterol 1.25mg q6  - continue symbicort 2 puff daily  - will switch to duoneb q6 PRN for SOB/wheezing    GI:  #Cholelithiasis   -  CT A/P  : Cholelithiasis. Gallbladder wall thickening versus pericholecystic fluid is nonspecific in the setting of generalized edema.  - patient with benign abdominal exam, no cholestatic pattern on LFTs, no evidence of infection,   - will obtain collateral when patient able    #Transaminitis Likely 2/2 to hepatic congestion in the setting of acute decompensated CHF  - P/w ALP - 182, ALP - 151, AST - 200  - CT A/P : - Heterogeneous hepatic enhancement which may be related to congestion or underlying hepatic dysfunction. Correlate clinically.  - Trend LFTs daily    #nutrition  - NPO  - bedside swallow after extubation, and advance diet as able     RENAL:  #Hypervolemic hyponatremia 2/2 systolic heart failure  - Na 123 on admission now 131  - continue furosemide 40mg IV qd  - Stict I&O    #elevated lactate 2/2 new onset acute systolic heart failure  - P/w lactate 5.3>9.1>6.4>4.9>3.5  - repeat lactate 2pm    INFECTIOUS:  #No active issues  - WBC 7.9, afebrile, without evidence of infection,   - BCx NTD  - atypical w/u neg, sputum cx norm resp real   - will discontinue ceftriaxone and azithromycin, hypoxia likely related to new onset HF      HEMO:  #normocytic anemia  - Hgb 12.0 on admission, now 11.1  - likely in setting of critical illness  - monitor CBC daily  - transfuse for Hgb goal >7    #elevated INR  - INR 2.5 on admission > 1.7  - possibly due to hepatic congestion 2/2 heart failure  - monitor for s/s bleeding    ENDO:  #no active issues (A1c 5.6)    MSK/SKIN:  # Lower extremity DTI  - Podiatry consulted    PPX:  - DVT: lovenox  - GI: PPI       Dispo:  ICU  DNR/DNI

## 2024-11-21 NOTE — PROGRESS NOTE ADULT - SUBJECTIVE AND OBJECTIVE BOX
INTERVAL HPI/OVERNIGHT EVENTS:       PRESSORS: [ ] YES [ ] NO  WHICH:    ANTIBIOTICS:                  DATE STARTED:  ANTIBIOTICS:                  DATE STARTED:    Antimicrobial:    Cardiovascular:  furosemide   Injectable 40 milliGRAM(s) IV Push daily    Pulmonary:  albuterol/ipratropium for Nebulization 3 milliLiter(s) Nebulizer every 6 hours PRN    Hematalogic:  enoxaparin Injectable 30 milliGRAM(s) SubCutaneous every 24 hours    Other:  chlorhexidine 2% Cloths 1 Application(s) Topical <User Schedule>  pantoprazole   Suspension 40 milliGRAM(s) Oral daily  potassium chloride    Tablet ER 40 milliEquivalent(s) Oral every 4 hours  potassium chloride  10 mEq/100 mL IVPB 10 milliEquivalent(s) IV Intermittent every 1 hour    albuterol/ipratropium for Nebulization 3 milliLiter(s) Nebulizer every 6 hours PRN  chlorhexidine 2% Cloths 1 Application(s) Topical <User Schedule>  enoxaparin Injectable 30 milliGRAM(s) SubCutaneous every 24 hours  furosemide   Injectable 40 milliGRAM(s) IV Push daily  pantoprazole   Suspension 40 milliGRAM(s) Oral daily  potassium chloride    Tablet ER 40 milliEquivalent(s) Oral every 4 hours  potassium chloride  10 mEq/100 mL IVPB 10 milliEquivalent(s) IV Intermittent every 1 hour    Drug Dosing Weight  Height (cm): 154.9 (18 Nov 2024 16:15)  Weight (kg): 53.6 (18 Nov 2024 23:43)  BMI (kg/m2): 22.3 (18 Nov 2024 23:43)  BSA (m2): 1.51 (18 Nov 2024 23:43)    PHYSICAL EXAM:      LINES/DRAINS/DEVICES  CENTRAL LINE: [ ] YES [ ] NO  LOCATION:     ROTH: [ ] YES [ ] NO     A-LINE:  [ ] YES [ ] NO  LOCATION:       ICU Vital Signs Last 24 Hrs  T(C): 36.6 (21 Nov 2024 16:00), Max: 38.1 (20 Nov 2024 20:00)  T(F): 97.9 (21 Nov 2024 16:00), Max: 100.6 (20 Nov 2024 20:00)  HR: 82 (21 Nov 2024 16:00) (82 - 103)  BP: 84/63 (21 Nov 2024 16:00) (83/58 - 120/62)  BP(mean): 70 (21 Nov 2024 16:00) (64 - 78)  ABP: --  ABP(mean): --  RR: 15 (21 Nov 2024 16:00) (12 - 19)  SpO2: 100% (21 Nov 2024 16:00) (95% - 100%)    O2 Parameters below as of 21 Nov 2024 07:00  Patient On (Oxygen Delivery Method): nasal cannula  O2 Flow (L/min): 2          ABG - ( 20 Nov 2024 11:25 )  pH, Arterial: 7.52  pH, Blood: x     /  pCO2: 34    /  pO2: 123   / HCO3: 28    / Base Excess: 5.1   /  SaO2: 99              11-20 @ 07:01  -  11-21 @ 07:00  --------------------------------------------------------  IN: 863.6 mL / OUT: 3910 mL / NET: -3046.4 mL        LABS:  CBC Full  -  ( 21 Nov 2024 04:31 )  WBC Count : 9.20 K/uL  RBC Count : 3.88 M/uL  Hemoglobin : 10.6 g/dL  Hematocrit : 31.4 %  Platelet Count - Automated : 146 K/uL  Mean Cell Volume : 80.9 fl  Mean Cell Hemoglobin : 27.3 pg  Mean Cell Hemoglobin Concentration : 33.8 g/dL  Auto Neutrophil # : x  Auto Lymphocyte # : x  Auto Monocyte # : x  Auto Eosinophil # : x  Auto Basophil # : x  Auto Neutrophil % : x  Auto Lymphocyte % : x  Auto Monocyte % : x  Auto Eosinophil % : x  Auto Basophil % : x    11-21    137  |  101  |  18  ----------------------------<  118[H]  3.0[L]   |  30  |  0.75    Ca    8.5      21 Nov 2024 15:15  Phos  2.3     11-21  Mg     2.3     11-21    TPro  5.2[L]  /  Alb  2.6[L]  /  TBili  2.1[H]  /  DBili  x   /  AST  101[H]  /  ALT  147[H]  /  AlkPhos  117  11-21      Urinalysis Basic - ( 21 Nov 2024 15:15 )    Color: x / Appearance: x / SG: x / pH: x  Gluc: 118 mg/dL / Ketone: x  / Bili: x / Urobili: x   Blood: x / Protein: x / Nitrite: x   Leuk Esterase: x / RBC: x / WBC x   Sq Epi: x / Non Sq Epi: x / Bacteria: x      Culture Results:   Commensal real consistent with body site (11-20 @ 16:00)  Culture Results:   No growth at 48 Hours (11-18 @ 17:10)  Culture Results:   No growth at 48 Hours (11-18 @ 17:00)      RADIOLOGY & ADDITIONAL STUDIES REVIEWED DURING TEAM ROUNDS    [ ]GOALS OF CARE DISCUSSION WITH PATIENT/FAMILY/PROXY:    CRITICAL CARE TIME SPENT: 35 minutes   INTERVAL HPI/OVERNIGHT EVENTS:   - No acute events overnight. Patient is awake and alert, in nad. Bedside POCUS showing VTI 12 while on 2.5mcg of .     PRESSORS: [ ] YES [ ] NO  WHICH:    ANTIBIOTICS:                  DATE STARTED:  ANTIBIOTICS:                  DATE STARTED:    Antimicrobial:    Cardiovascular:  furosemide   Injectable 40 milliGRAM(s) IV Push daily    Pulmonary:  albuterol/ipratropium for Nebulization 3 milliLiter(s) Nebulizer every 6 hours PRN    Hematalogic:  enoxaparin Injectable 30 milliGRAM(s) SubCutaneous every 24 hours    Other:  chlorhexidine 2% Cloths 1 Application(s) Topical <User Schedule>  pantoprazole   Suspension 40 milliGRAM(s) Oral daily  potassium chloride    Tablet ER 40 milliEquivalent(s) Oral every 4 hours  potassium chloride  10 mEq/100 mL IVPB 10 milliEquivalent(s) IV Intermittent every 1 hour    albuterol/ipratropium for Nebulization 3 milliLiter(s) Nebulizer every 6 hours PRN  chlorhexidine 2% Cloths 1 Application(s) Topical <User Schedule>  enoxaparin Injectable 30 milliGRAM(s) SubCutaneous every 24 hours  furosemide   Injectable 40 milliGRAM(s) IV Push daily  pantoprazole   Suspension 40 milliGRAM(s) Oral daily  potassium chloride    Tablet ER 40 milliEquivalent(s) Oral every 4 hours  potassium chloride  10 mEq/100 mL IVPB 10 milliEquivalent(s) IV Intermittent every 1 hour    Drug Dosing Weight  Height (cm): 154.9 (2024 16:15)  Weight (kg): 53.6 (2024 23:43)  BMI (kg/m2): 22.3 (2024 23:43)  BSA (m2): 1.51 (2024 23:43)    PHYSICAL EXAM:  GENERAL: elderly, cachetic appearing male, intubated, sedated laying in bed   EYES: PERRL  NECK: Supple, No JVD; Trachea midline  NERVOUS SYSTEM: Awake and alert, following commands appropriately.   CHEST/LUNG: Diminished breath sounds bilaterally.   HEART: Regular rate and rhythm; No murmurs, rubs, or gallops  ABDOMEN: Soft, Nondistended; Bowel sounds present, no pain or masses on palpation  : voiding well, Roth in place  EXTREMITIES:  2+ Peripheral Pulses, +4 b/l lower ext    LINES/DRAINS/DEVICES  CENTRAL LINE: [ ] YES [X] NO  LOCATION:     ROTH: [X] YES [ ] NO     A-LINE:  [ ] YES [X] NO  LOCATION:       ICU Vital Signs Last 24 Hrs  T(C): 36.6 (2024 16:00), Max: 38.1 (2024 20:00)  T(F): 97.9 (2024 16:00), Max: 100.6 (2024 20:00)  HR: 82 (2024 16:00) (82 - 103)  BP: 84/63 (2024 16:00) (83/58 - 120/62)  BP(mean): 70 (2024 16:00) (64 - 78)  ABP: --  ABP(mean): --  RR: 15 (2024 16:00) (12 - 19)  SpO2: 100% (2024 16:00) (95% - 100%)    O2 Parameters below as of 2024 07:00  Patient On (Oxygen Delivery Method): nasal cannula  O2 Flow (L/min): 2      ABG - ( 2024 11:25 )  pH, Arterial: 7.52  pH, Blood: x     /  pCO2: 34    /  pO2: 123   / HCO3: 28    / Base Excess: 5.1   /  SaO2: 99        11 @ 07:01  -  - @ 07:00  --------------------------------------------------------  IN: 863.6 mL / OUT: 3910 mL / NET: -3046.4 mL      LABS:  CBC Full  -  ( 2024 04:31 )  WBC Count : 9.20 K/uL  RBC Count : 3.88 M/uL  Hemoglobin : 10.6 g/dL  Hematocrit : 31.4 %  Platelet Count - Automated : 146 K/uL  Mean Cell Volume : 80.9 fl  Mean Cell Hemoglobin : 27.3 pg  Mean Cell Hemoglobin Concentration : 33.8 g/dL  Auto Neutrophil # : x  Auto Lymphocyte # : x  Auto Monocyte # : x  Auto Eosinophil # : x  Auto Basophil # : x  Auto Neutrophil % : x  Auto Lymphocyte % : x  Auto Monocyte % : x  Auto Eosinophil % : x  Auto Basophil % : x        137  |  101  |  18  ----------------------------<  118[H]  3.0[L]   |  30  |  0.75    Ca    8.5      2024 15:15  Phos  2.3       Mg     2.3         TPro  5.2[L]  /  Alb  2.6[L]  /  TBili  2.1[H]  /  DBili  x   /  AST  101[H]  /  ALT  147[H]  /  AlkPhos  117        Urinalysis Basic - ( 2024 15:15 )    Color: x / Appearance: x / SG: x / pH: x  Gluc: 118 mg/dL / Ketone: x  / Bili: x / Urobili: x   Blood: x / Protein: x / Nitrite: x   Leuk Esterase: x / RBC: x / WBC x   Sq Epi: x / Non Sq Epi: x / Bacteria: x      Culture Results:   Commensal real consistent with body site ( @ 16:00)  Culture Results:   No growth at 48 Hours ( @ 17:10)  Culture Results:   No growth at 48 Hours ( @ 17:00)      RADIOLOGY & ADDITIONAL STUDIES REVIEWED DURING TEAM ROUNDS    [ ]GOALS OF CARE DISCUSSION WITH PATIENT/FAMILY/PROXY:    CRITICAL CARE TIME SPENT: 35 minutes

## 2024-11-21 NOTE — PROGRESS NOTE ADULT - PROBLEM SELECTOR PLAN 2
-resolved  - likely secondary to ADHF   - continue diuresis with Lasix 40g IV daily. -resolved  - likely secondary to ADHF

## 2024-11-21 NOTE — PROGRESS NOTE ADULT - ASSESSMENT
89 M from home with SOB. Hx of COPD, prostate CA s/p radiation. Treated for sepsis in ED, intubated for resp distress after failing bipap. EF 18%. Extubated with plan for home hospice after d/c.

## 2024-11-21 NOTE — PROGRESS NOTE ADULT - PROBLEM SELECTOR PLAN 1
-secondary to acutely decompensated HFrEF  - unclear etiology. No prior cardiac workup.   Evidence of end organ dysfunction with transaminitis, and lactic acidosis.   ECHO with LVEF 18 % and global left ventricular hypokinesis.  Lactate improved on inotrope therapy. Continue dobutamine as tolerated  Continue IV diuresis with goal net negative 1-2 liters/24 hours. -secondary to acutely decompensated HFrEF  - unclear etiology. No prior cardiac workup.   -Evidence of end organ dysfunction with transaminitis, and lactic acidosis.   -ECHO with LVEF 18 % and global left ventricular hypokinesis.  -Lactate improved on inotrope therapy. Continue dobutamine as tolerated  -Continue IV diuresis with Lasix 40g IV daily for goal net negative 1-2 liters/24 hours.  -recommend adding Losartan 12.5 mg PO bid

## 2024-11-21 NOTE — PROGRESS NOTE ADULT - SUBJECTIVE AND OBJECTIVE BOX
PRESENTING CC: shortness of breath, altered mental status    OVERNIGHT EVENTS: No new events overnight     PMH:   PAST MEDICAL & SURGICAL HISTORY:  Prostate cancer    Chronic obstructive pulmonary disease        Allergies    No Known Allergies    Intolerances        MEDICATIONS  (STANDING):  chlorhexidine 2% Cloths 1 Application(s) Topical <User Schedule>  enoxaparin Injectable 30 milliGRAM(s) SubCutaneous every 24 hours  furosemide   Injectable 40 milliGRAM(s) IV Push daily  pantoprazole   Suspension 40 milliGRAM(s) Oral daily    MEDICATIONS  (PRN):  albuterol/ipratropium for Nebulization 3 milliLiter(s) Nebulizer every 6 hours PRN Shortness of Breath and/or Wheezing      FAMILY HISTORY:    Reviewed; no change from my prior note    SOCIAL HISTORY:  Reviewed, no change from my prior note    REVIEW OF SYSTEMS:  Constitutional: [ ] fever, [ ]weight loss,  [ ]fatigue  Eyes: [ ] visual changes  Respiratory: [ ]shortness of breath;  [ ] cough, [ ]wheezing, [ ]chills, [ ]hemoptysis  Cardiovascular: [ ] chest pain, [ ]palpitations, [ ]dizziness,  [ ]leg swelling [ ]syncope  Gastrointestinal: [ ] abdominal pain, [ ]nausea, [ ]vomiting,  [ ]diarrhea   Genitourinary: [ ] dysuria, [ ] hematuria  Neurologic: [ ] headaches [ ] tremors [ ] weakness [ ] lightheadedness  Skin: [ ] itching, [ ]burning, [ ] rashes  Endocrine: [ ] heat or cold intolerance  Musculoskeletal: [ ] joint pain or swelling; [ ] muscle, back, or extremity pain  Psychiatric: [ ] depression, [ ]anxiety, [ ]mood swings, or [ ]difficulty sleeping  Hematologic: [ ] easy bruising, [ ] bleeding gums    [x] All remaining systems negative except as per above.   [  ] Unable to obtain    Vital Signs Last 24 Hrs  T(C): 36.9 (21 Nov 2024 12:00), Max: 38.1 (20 Nov 2024 20:00)  T(F): 98.4 (21 Nov 2024 12:00), Max: 100.6 (20 Nov 2024 20:00)  HR: 92 (21 Nov 2024 12:00) (82 - 105)  BP: 120/62 (21 Nov 2024 12:00) (93/54 - 120/62)  BP(mean): 78 (21 Nov 2024 12:00) (64 - 78)  RR: 18 (21 Nov 2024 12:00) (12 - 19)  SpO2: 100% (21 Nov 2024 12:00) (95% - 100%)    Parameters below as of 21 Nov 2024 07:00  Patient On (Oxygen Delivery Method): nasal cannula  O2 Flow (L/min): 2    I&O's Summary    20 Nov 2024 07:01  -  21 Nov 2024 07:00  --------------------------------------------------------  IN: 863.6 mL / OUT: 3910 mL / NET: -3046.4 mL    21 Nov 2024 07:01  -  21 Nov 2024 15:27  --------------------------------------------------------  IN: 12 mL / OUT: 1175 mL / NET: -1163 mL        PHYSICAL EXAM:  General: No acute distress  HEENT: EOMI  Neck: No JVD  Lungs: Clear to auscultation bilaterally; No rales or wheezing  Heart: Regular rate and rhythm; No murmurs, rubs, or gallops  Abdomen: Soft, non tender, non distended   Extremities: Warm,  trace edema bilaterally  Nervous system:  Alert & Oriented X1  Psychiatric: Normal affect  Skin: No rashes or lesions    LABS:  11-21    136  |  101  |  19[H]  ----------------------------<  82  3.2[L]   |  27  |  0.55    Ca    8.2[L]      21 Nov 2024 04:31  Phos  2.3     11-21  Mg     2.3     11-21    TPro  5.2[L]  /  Alb  2.6[L]  /  TBili  2.1[H]  /  DBili  x   /  AST  101[H]  /  ALT  147[H]  /  AlkPhos  117  11-21    Creatinine Trend: 0.55<--, 0.85<--, 1.06<--, 1.03<--, 1.02<--, 1.01<--                        10.6   9.20  )-----------( 146      ( 21 Nov 2024 04:31 )             31.4     PT/INR - ( 19 Nov 2024 16:00 )   PT: 20.6 sec;   INR: 1.79 ratio         PTT - ( 19 Nov 2024 16:00 )  PTT:34.9 sec  Lipid Panel:   Cardiac Enzymes:         RADIOLOGY: < from: Xray Chest 1 View- PORTABLE-Urgent (Xray Chest 1 View- PORTABLE-Urgent .) (11.19.24 @ 12:59) >  AP chest. Prior 11/18/2024.    IMPRESSION: Nasogastric tube tip is excluded on the film but can be   traced to below the level of the gastroesophageal junction. Endotracheal   tube reidentified in position. No gross change heart and lungs.    --- End of Report ---        < end of copied text >  < from: CT Angio Chest PE Protocol w/ IV Cont (11.18.24 @ 21:15) >  IMPRESSION:  Limited evaluation for pulmonary embolus within the right segmental and   subsegmental pulmonary arteries due to mixing of contrast. There is no   pulmonary embolus seen within the remaining opacified pulmonary arterial   tree.    Cardiomegaly and Bilateral pleural effusions, left greater than right.   Suggestive of cardiogenic failure.    Reflux of contrast into the hepatic veins and intrahepatic IVC may   suggest right-sided heart dysfunction. Correlate clinically.     Heterogeneous hepatic enhancement which may be related to congestion or   underlying hepatic dysfunction. Correlate clinically.    Apparent wall thickening of the urinary bladder which may be related   sequelae of chronic outlet obstruction from prostate enlargement or   cystitis. Correlate clinically.    Question diffuse wall thickening of the stomach and small bowel.   Correlate clinically for diffuse gastroenteritis. This may be of   infectious or inflammatory etiology, the diffuse congestion or ischemic   bowel disease may also be considered. Correlate clinically.    Cholelithiasis. Gallbladder wall thickening versus pericholecystic fluid   is nonspecific in the setting of generalized edema.    Multiple soft tissue lipomas noted. Question of lipomatous lesion versus   fluid collection in the left supraclavicular region. Artifact limits   evaluation is region.    Focal bronchiectasis versus thick-walled cyst of the left lower lobe and   focal bronchiectasis with associated wall thickening of the right lower   lobe. This may be infectious or inflammatory in nature. Follow-up to   assess resolution.    9 mm left lower lobe pulmonary nodule. Single nodule larger than 8mm   should be followed by CT, PET/CT or tissue sampling at 3 months. --   ?Reference: Guidelines for Management of Incidental Pulmonary Nodules   Detected on CT Images: From the Fleischner Society 2017?    Additional findings as above.    --- End of Report ---    < end of copied text >  < from: Xray Chest 1 View- PORTABLE-Urgent (Xray Chest 1 View- PORTABLE-Urgent .) (11.18.24 @ 20:48) >    IMPRESSION: Status post intubation. Small bilateral pleural effusions.   Cardiomegaly.    --- End of Report ---    < end of copied text >  < from: Xray Chest 1 View- PORTABLE-Urgent (11.18.24 @ 18:30) >  IMPRESSION: Status post intubation. Small bilateral pleural effusions.   Cardiomegaly.    --- End of Report ---    < end of copied text >      ECG:  < from: 12 Lead ECG (11.19.24 @ 00:50) >  Diagnosis Line Sinus rhythm with occasional Premature ventricular complexes HR 84  Possible Left atrial enlargement  Left bundle branch block  Abnormal ECG    Confirmed by ASHANTI BLACKMON, [facility] ([id number]) on 11/20/2024 12:33:29 PM    < end of copied text >    TELEMETRY: NSR, with NSVTs HR range     ECHO:< from: TTE W or WO Ultrasound Enhancing Agent (11.19.24 @ 14:57) >  CONCLUSIONS:      1. Left ventricular cavity is mildly dilated. Left ventricular wall thickness is normal. Left ventricular systolic function is severely decreased with an ejection fraction of 18 % by Mejias's method of disks. Global left ventricular hypokinesis.   2. There is moderate (grade 2) left ventricular diastolic dysfunction.   3. Normal right ventricular cavity size, with normal wall thickness, and normal right ventricular systolic function. Tricuspid annular plane systolic excursion (TAPSE) is 1.7 cm (normal >=1.7 cm). Tricuspid annular tissue Doppler S' is 11.0 cm/s (normal >10 cm/s).   4. Left atrium is severely dilated.   5. Moderate mitral regurgitation.   6. Mild tricuspidregurgitation.   7. Estimated pulmonary artery systolic pressure is 44 mmHg, consistent with mild pulmonary hypertension.   8. The inferior vena cava is normal in size measuring 1.90 cm in diameter, (normal <2.1cm) with abnormal inspiratory collapse (abnormal <50%) consistent with mildly elevated right atrial pressure (~8, range 5-10mmHg).   9. No pericardial effusion seen.  10. Bilateral pleural effusion noted.  11. No prior echocardiogram is available for comparison.    ________________________________________________________________________________________    < end of copied text >

## 2024-11-21 NOTE — PROGRESS NOTE ADULT - SUBJECTIVE AND OBJECTIVE BOX
follow up on:  complex medical decision making related to goals of care    [facility] Geriatric and Palliative Consult Service:  Mariana Williamson DO: cell ([phone])  Rex Jones MD: cell ([phone])  Tom Delgado NP: cell ([phone])   Lei Mckeon LMSW: cell ([phone])   Jessica Fleischer-Black, MD: cell: ([phone])    Can contact any Palliative Team member via [employer] Teams for consults and questions    OVERNIGHT EVENTS: extubated. Failed swallow    Present Symptoms: Mild    Review of Systems: All others negative   Present Symptoms: Mild  Pain:             Location -                               Aggravating factors -             Quality -             Radiation -             Timing-             Severity (0-10 scale):             Minimal acceptable level (0-10 scale):  Fatigue:  Nausea:  Lack of Appetite:   SOB:  Depression:  Anxiety:  Constipation:     CPOT:    https://ccs-st.org/resources/Documents/Sedation%20Analgesia%20Delirium%20in%20CC/CCS%20STH%20Guideline%20template%20CPOT.pdf  PAIN AD Score:   [email or web address]/painad.pdf (press ctrl +  left click to view)    MEDICATIONS  (STANDING):  chlorhexidine 2% Cloths 1 Application(s) Topical <User Schedule>  enoxaparin Injectable 30 milliGRAM(s) SubCutaneous every 24 hours  furosemide   Injectable 40 milliGRAM(s) IV Push daily  pantoprazole   Suspension 40 milliGRAM(s) Oral daily    MEDICATIONS  (PRN):  albuterol/ipratropium for Nebulization 3 milliLiter(s) Nebulizer every 6 hours PRN Shortness of Breath and/or Wheezing      PHYSICAL EXAM:  Vital Signs Last 24 Hrs  T(C): 36.9 (21 Nov 2024 12:00), Max: 38.1 (20 Nov 2024 20:00)  T(F): 98.4 (21 Nov 2024 12:00), Max: 100.6 (20 Nov 2024 20:00)  HR: 92 (21 Nov 2024 12:00) (82 - 105)  BP: 120/62 (21 Nov 2024 12:00) (93/54 - 120/62)  BP(mean): 78 (21 Nov 2024 12:00) (64 - 78)  RR: 18 (21 Nov 2024 12:00) (12 - 19)  SpO2: 100% (21 Nov 2024 12:00) (95% - 100%)    Parameters below as of 21 Nov 2024 07:00  Patient On (Oxygen Delivery Method): nasal cannula  O2 Flow (L/min): 2      General: alert  oriented x 2     cachexia  verbal     Palliative Performance Scale/Karnofsky Score: 30%  http://npcrc.org/files/news/palliative_performance_scale_ppsv2.pdf    HEENT: no abnormal lesion. NGT. Hoarse voice  Lungs: CTA b/l  CV: RRR, S1S2, tachycardia  GI: soft non distended non tender  incontinent                last BM: none recorded  :  meredith  Musculoskeletal: weakness x4 edema x2 b/l LE    ambulatory with assistance   Skin:  poor skin turgor, pressure ulcers stage: unstagable on sacrum  Neuro: no deficits  Oral intake ability: unable/only mouth care for now until SLP eval    LABS:                          10.6   9.20  )-----------( 146      ( 21 Nov 2024 04:31 )             31.4     11-21    136  |  101  |  19[H]  ----------------------------<  82  3.2[L]   |  27  |  0.55    Ca    8.2[L]      21 Nov 2024 04:31  Phos  2.3     11-21  Mg     2.3     11-21    TPro  5.2[L]  /  Alb  2.6[L]  /  TBili  2.1[H]  /  DBili  x   /  AST  101[H]  /  ALT  147[H]  /  AlkPhos  117  11-21    Urinalysis Basic - ( 21 Nov 2024 04:31 )    Color: x / Appearance: x / SG: x / pH: x  Gluc: 82 mg/dL / Ketone: x  / Bili: x / Urobili: x   Blood: x / Protein: x / Nitrite: x   Leuk Esterase: x / RBC: x / WBC x   Sq Epi: x / Non Sq Epi: x / Bacteria: x        RADIOLOGY & ADDITIONAL STUDIES: reviewed

## 2024-11-21 NOTE — CHART NOTE - NSCHARTNOTEFT_GEN_A_CORE
Assessment:     Factors impacting intake: [ ] none [ ] nausea  [ ] vomiting [ ] diarrhea [ ] constipation  [ ]chewing problems [ ] swallowing issues  [ ] other:     Diet Presciption: Diet, NPO with Tube Feed:   Tube Feeding Modality: Nasogastric  Jevity 1.5 Reza  Total Volume for 24 Hours (mL): 450  Continuous  Starting Tube Feed Rate {mL per Hour}: 10  Increase Tube Feed Rate by (mL): 10     Every 4 hours  Until Goal Tube Feed Rate (mL per Hour): 25  Tube Feed Duration (in Hours): 18  Tube Feed Start Time: 12:00  Tube Feed Stop Time: 06:00 (24 @ 19:00)    Intake:     Daily Weight in k (2024 07:00)  Weight in k.4 (2024 07:00)    % Weight Change    Pertinent Medications: MEDICATIONS  (STANDING):  chlorhexidine 2% Cloths 1 Application(s) Topical <User Schedule>  enoxaparin Injectable 30 milliGRAM(s) SubCutaneous every 24 hours  furosemide   Injectable 40 milliGRAM(s) IV Push daily  pantoprazole   Suspension 40 milliGRAM(s) Oral daily    MEDICATIONS  (PRN):  albuterol/ipratropium for Nebulization 3 milliLiter(s) Nebulizer every 6 hours PRN Shortness of Breath and/or Wheezing    Pertinent Labs: 11-21 Na136 mmol/L Glu 82 mg/dL K+ 3.2 mmol/L[L] Cr  0.55 mg/dL BUN 19 mg/dL[H] 11-21 Phos 2.3 mg/dL[L] 11-21 Alb 2.6 g/dL[L] 11-19 Chol 113 mg/dL LDL --    HDL 61 mg/dL Trig 61 mg/dL     CAPILLARY BLOOD GLUCOSE      POCT Blood Glucose.: 92 mg/dL (2024 16:46)      Skin:     Estimated Needs:   [ ] no change since previous assessment  [ ] recalculated:     Previous Nutrition Diagnosis:   [ ] Inadequate Energy Intake [ ]Inadequate Oral Intake [ ] Excessive Energy Intake   [ ] Underweight [ ] Increased Nutrient Needs [ ] Overweight/Obesity  [ ] Swallowing Difficult   [ ] Altered GI Function [ ] Unintended Weight Loss [ ] Food & Nutrition Related Knowledge Deficit [ ] Malnutrition   [ ] Not Ready for Diet/Life Style Changes     Nutrition Diagnosis is [ ] ongoing  [ ] Improving   [ ] resolved [ ] not applicable     New Nutrition Diagnosis: [ ] not applicable       Interventions:   Recommend  [ ] Change Diet To:  [ ] Nutrition Supplement  [ ] Nutrition Support  [ ] Other:     Monitoring and Evaluation:   [ ] PO intake [ x ] Tolerance to diet prescription [ x ] weights [ x ] labs[ x ] follow up per protocol  [ ] other: Assessment:       Nutrition reassessment for follow-up. Chart reviewed, pt visited in ICU, s/p extubation 24, NGT remains in place, TF on hold, for Swallow evaluation per MD, Palliative on the case; intake x <50% needs x >5d; Wt downward trend (118 lb 24-->105 lb 24) indicating >2% x 1wk despite of partly may due to scale/fluid     Factors impacting intake: [ x ] other: change in mental status; difficulty swallowing; Advanced age, acute on chronic comorbidities, s/p extubation    Nutrition Focused Physical Exam not feasible at present due to medical condition; Underweight; emaciated; Visual severe muscle loss: temporal, clavicle, shoulder; Visual severe fat loss: orbital, bucco ( but findings may also due to age related sarcopenia)    Diet Prescription: Diet, NPO with Tube Feed:   Tube Feeding Modality: Nasogastric  Jevity 1.5 Reza  Total Volume for 24 Hours (mL): 450  Continuous  Starting Tube Feed Rate {mL per Hour}: 10  Increase Tube Feed Rate by (mL): 10     Every 4 hours  Until Goal Tube Feed Rate (mL per Hour): 25  Tube Feed Duration (in Hours): 18  Tube Feed Start Time: 12:00  Tube Feed Stop Time: 06:00 (24 @ 19:00)    Daily Weight in k (2024 07:00)  Weight in k.4 (2024 07:00)       Pertinent Medications: MEDICATIONS  (STANDING):  chlorhexidine 2% Cloths 1 Application(s) Topical <User Schedule>  enoxaparin Injectable 30 milliGRAM(s) SubCutaneous every 24 hours  furosemide   Injectable 40 milliGRAM(s) IV Push daily  pantoprazole   Suspension 40 milliGRAM(s) Oral daily    MEDICATIONS  (PRN):  albuterol/ipratropium for Nebulization 3 milliLiter(s) Nebulizer every 6 hours PRN Shortness of Breath and/or Wheezing    Pertinent Labs:  Na136 mmol/L Glu 82 mg/dL K+ 3.2 mmol/L[L] Cr  0.55 mg/dL BUN 19 mg/dL[H]  Phos 2.3 mg/dL[L]  Alb 2.6 g/dL[L]  Chol 113 mg/dL LDL --    HDL 61 mg/dL Trig 61 mg/dL     CAPILLARY BLOOD GLUCOSE    POCT Blood Glucose.: 92 mg/dL (2024 16:46)    Skin: Multiple Pressure Injury: Unstagable, DTI     Estimated Needs:   [ x ] no change since previous assessment  [ ] recalculated:     Previous Nutrition Diagnosis:   [ x ] Inadequate Oral Intake     New Nutrition Diagnosis: [ ] not applicable    [ x ] Malnutrition ( Severe Malnutrition in context of acute on chronic illness) related to inadequate intake with acute on chronic comorbidities, s/p intubation; increased nutrition needs for wound healing as evidenced by intake <50% needs x >5d, wt loss >2% x 1wk, severe muscle/fat loss, debility, decubitis      Interventions/Recommend  [ x ] Advance diet with oral nutritional supplement as medically feasible if indicated per Swallow evaluation        If TF continued, Goal: Goal: Jevity 1.5 @ 50 ml/h x 18 hr ( 900 ml, 1351 kcal, 57 g protein, 683 ml water daily at goal) MD to adjust free water as medically feasible  [ x ] Nutrition Supplement: Shane 1 pack bid as medically feasible po or TF   [ ] Nutrition Support  [ x ] Other: Discussed with team at [facility]     Monitoring and Evaluation:   [ ] PO intake [ x ] Tolerance to diet prescription [ x ] weights [ x ] labs[ x ] follow up per protocol  [ ] other: Assessment:       Nutrition reassessment for follow-up. Chart reviewed, pt visited in ICU, s/p extubation 24, NGT remains in place, TF on hold, for Swallow evaluation per MD, Palliative on the case; intake x <50% needs x >5d; Wt downward trend, quantity ?: 118 lb 24-->105 lb 24, indicating >2% x 1wk, despite of partly may due to scale/fluid     Factors impacting intake: [ x ] other: change in mental status; difficulty swallowing; Advanced age, acute on chronic comorbidities, s/p extubation    Nutrition Focused Physical Exam not feasible at present due to medical condition; Underweight; emaciated; Visual severe muscle loss: temporal, clavicle, shoulder; Visual severe fat loss: orbital, bucco ( but findings may also due to age related sarcopenia)    Diet Prescription: Diet, NPO with Tube Feed:   Tube Feeding Modality: Nasogastric  Jevity 1.5 Reza  Total Volume for 24 Hours (mL): 450  Continuous  Starting Tube Feed Rate {mL per Hour}: 10  Increase Tube Feed Rate by (mL): 10     Every 4 hours  Until Goal Tube Feed Rate (mL per Hour): 25  Tube Feed Duration (in Hours): 18  Tube Feed Start Time: 12:00  Tube Feed Stop Time: 06:00 (24 @ 19:00)    Daily Weight in k (2024 07:00)  Weight in k.4 (2024 07:00)       Pertinent Medications: MEDICATIONS  (STANDING):  chlorhexidine 2% Cloths 1 Application(s) Topical <User Schedule>  enoxaparin Injectable 30 milliGRAM(s) SubCutaneous every 24 hours  furosemide   Injectable 40 milliGRAM(s) IV Push daily  pantoprazole   Suspension 40 milliGRAM(s) Oral daily    MEDICATIONS  (PRN):  albuterol/ipratropium for Nebulization 3 milliLiter(s) Nebulizer every 6 hours PRN Shortness of Breath and/or Wheezing    Pertinent Labs:  Na136 mmol/L Glu 82 mg/dL K+ 3.2 mmol/L[L] Cr  0.55 mg/dL BUN 19 mg/dL[H]  Phos 2.3 mg/dL[L]  Alb 2.6 g/dL[L]  Chol 113 mg/dL LDL --    HDL 61 mg/dL Trig 61 mg/dL     CAPILLARY BLOOD GLUCOSE    POCT Blood Glucose.: 92 mg/dL (2024 16:46)    Skin: Multiple Pressure Injury: Unstagable, DTI; 2+ foot edema     Estimated Needs:   [ x ] no change since previous assessment  [ ] recalculated:     Previous Nutrition Diagnosis:   [ x ] Inadequate Oral Intake     New Nutrition Diagnosis: [ ] not applicable    [ x ] Malnutrition ( Severe Malnutrition in context of acute on chronic illness) related to inadequate intake with acute on chronic comorbidities, s/p intubation; increased nutrition needs for wound healing as evidenced by intake <50% needs x >5d, wt loss >2% x 1wk, severe muscle/fat loss, debility, decubitis      Interventions/Recommend  [ x ] Advance diet with oral nutritional supplement as medically feasible if indicated per Swallow evaluation        If TF continued, Goal: Goal: Jevity 1.5 @ 50 ml/h x 18 hr ( 900 ml, 1351 kcal, 57 g protein, 683 ml water daily at goal) MD to adjust free water as medically feasible  [ x ] Nutrition Supplement: Shane 1 pack bid as medically feasible PO or TF   [ ] Nutrition Support  [ x ] Other: Discussed with team at [facility]     Monitoring and Evaluation:   [ ] PO intake [ x ] Tolerance to diet prescription [ x ] weights [ x ] labs[ x ] follow up per protocol  [ ] other:

## 2024-11-21 NOTE — DIETITIAN NUTRITION RISK NOTIFICATION - TREATMENT: THE FOLLOWING DIET HAS BEEN RECOMMENDED
Diet, NPO with Tube Feed:   Tube Feeding Modality: Nasogastric  Jevity 1.5 Reza  Total Volume for 24 Hours (mL): 450  Continuous  Starting Tube Feed Rate {mL per Hour}: 10  Increase Tube Feed Rate by (mL): 10     Every 4 hours  Until Goal Tube Feed Rate (mL per Hour): 25  Tube Feed Duration (in Hours): 18  Tube Feed Start Time: 12:00  Tube Feed Stop Time: 06:00 (11-19-24 @ 19:00) [Active]    Pending Swallow evaluation per MD ( please see Nutrition Chart Note in Documents)

## 2024-11-21 NOTE — DIETITIAN NUTRITION RISK NOTIFICATION - ADDITIONAL COMMENTS/DIETITIAN RECOMMENDATIONS
Malnutrition Diagnosis Parameters: intake <50% needs x >5d, wt loss >2% x 1wk, severe muscle loss, severe fat loss, debility, decubitis

## 2024-11-21 NOTE — PHARMACOTHERAPY INTERVENTION NOTE - INTERVENTION TYPE RECOOMEND
Therapy Recommended - Alternative treatment
Dose Optimization/Non-renal Dose Adjustment
Dose Optimization/Non-renal Dose Adjustment

## 2024-11-22 NOTE — PROGRESS NOTE ADULT - CRITICAL CARE ATTENDING COMMENT
89M  with h/o COPD, prostate cancer s/p RT was said to have presented to the ED with worsening SOB associated with leg swelling and in the ED he was placed on bipap and thereafter intubated and placed on mechanical ventilation.     Labs/Imagings reviewed. Hb 12, INR 2.51, D-dimer 315, lactate 5.3, Na 126, HCO3 of 16. CXR showed mild interstitial infiltrates with bilateral pl effusions.  CT chest/abdomen showed no PE, mild bilateral GGOs, LLL nodule, cholestasis   POCUS at bedside showed dilated LV with severely decreased LV systolic contractility, no RV strain, no pericardial effusion, IVC plethoric, bilateral pleural effusions.    Assessment  Acute respiratory failure from likely decompensated heart failure s/p intubation  Acute decompensated heart failure   Bilateral pleural effusions from heart failure  Lactic acidosis likely Type A  Hyponatremia  GB stones with wall thickening likely edema  LLL nodule  H/o COPD  H/o prostate cancer s/p RT    Plan  Continue mechanical ventilation  Monitor ABG and adjust vent settings as needed.  Trend troponins, 12 lead EKG  IV lasix 40mg q12hr  No obvious signs of infection, will obseve off antibiotics  Trend lactate to target <2, remains elevated  Add low dose dobutamine to monitor response   Monitor serum sodium  OG tube for feeding   D/c duo-nebs for now, as to avoid resultant tachycardia  DVT/GI prophylaxis
89M  with h/o COPD, prostate cancer s/p RT was said to have presented to the ED with worsening SOB associated with leg swelling and in the ED he was placed on bipap and thereafter intubated and placed on mechanical ventilation.     Labs/Imagings reviewed. Hb 12, INR 2.51, D-dimer 315, lactate 5.3, Na 126, HCO3 of 16. CXR showed mild interstitial infiltrates with bilateral pl effusions.  CT chest/abdomen showed no PE, mild bilateral GGOs, LLL nodule, cholestasis   POCUS at bedside showed dilated LV with severely decreased LV systolic contractility, no RV strain, no pericardial effusion, IVC plethoric, bilateral pleural effusions.    Assessment  Acute respiratory failure from likely decompensated heart failure s/p intubation  Acute decompensated heart failure   Bilateral pleural effusions from heart failure  Lactic acidosis likely Type A  Hyponatremia  GB stones with wall thickening likely edema  LLL nodule  H/o COPD  H/o prostate cancer s/p RT    Plan  Good response to diuresis with inotropic support   Decrease diuretic dose given development of alkalosis   Cont. to aim for euvolemia   Extubated this morning   IV lasix 40mg daily  No obvious signs of infection, will obseve off antibiotics  Lactate normalized   OG tube for feeding   D/c duo-nebs for now, as to avoid resultant tachycardia  DVT/GI prophylaxis  Discussed with daughter at bedside  Understands guarded prognosis given severe cardiomyopathy and low EF
89M  with h/o COPD, prostate cancer s/p RT was said to have presented to the ED with worsening SOB associated with leg swelling and in the ED he was placed on bipap and thereafter intubated and placed on mechanical ventilation.     Labs/Imagings reviewed. Hb 12, INR 2.51, D-dimer 315, lactate 5.3, Na 126, HCO3 of 16. CXR showed mild interstitial infiltrates with bilateral pl effusions.  CT chest/abdomen showed no PE, mild bilateral GGOs, LLL nodule, cholestasis   POCUS at bedside showed dilated LV with severely decreased LV systolic contractility, no RV strain, no pericardial effusion, IVC plethoric, bilateral pleural effusions.    Assessment  Acute respiratory failure from likely decompensated heart failure s/p intubation  Acute decompensated heart failure   Bilateral pleural effusions from heart failure  Lactic acidosis likely Type A  Hyponatremia  GB stones with wall thickening likely edema  LLL nodule  H/o COPD  H/o prostate cancer s/p RT    Plan  Remains with negative fluid balance   Cont diuresis to aim for euvolemia   Extubated 11/20  IV lasix 40mg daily  No obvious signs of infection, will observe off antibiotics  Hold dobutamine and monitor response  Unable to initiate GDMT given low BP   Cardiology follow up   Dysphagia screening   DVT/GI prophylaxis  Discussed with daughter at bedside  Understands guarded prognosis given severe cardiomyopathy and low EF  DNR/Trial of intubation   May be appropriate for home with hospice
89M  with h/o COPD, prostate cancer s/p RT was said to have presented to the ED with worsening SOB associated with leg swelling and in the ED he was placed on bipap and thereafter intubated and placed on mechanical ventilation.     Assessment  Acute respiratory failure from likely decompensated heart failure s/p intubation  Acute decompensated heart failure   Bilateral pleural effusions from heart failure  Lactic acidosis likely Type A  Hyponatremia  GB stones with wall thickening likely edema  LLL nodule  H/o COPD  H/o prostate cancer s/p RT    Plan  Remains with negative fluid balance   Cont diuresis to aim for euvolemia   Extubated 11/20  IV lasix 40mg daily  No obvious signs of infection, will observe off antibiotics  Stable off dobutamine  Unable to initiate GDMT given low BP   Cardiology follow up   Dysphagia screening and diet as per swallow evaluation   Void trial  DVT/GI prophylaxis  Understands guarded prognosis given severe cardiomyopathy and low EF  DNR/Trial of intubation   May be appropriate for home with hospice  Transfer out of ICU

## 2024-11-22 NOTE — SWALLOW BEDSIDE ASSESSMENT ADULT - SLP PERTINENT HISTORY OF CURRENT PROBLEM
89 y/o M with PMH COPD, and prostate cancer (in remission s/p radiation) who presented with 2 day history of worsening shortness of breath, and confusion. Pt complaining of worsening shortness of breath and difficulty breathing for 2 days associated with LE swelling, and orthopnea. In the ED patient was given ceftriaxone, azithromycin, and given 3L IV fluid. Bedside POCUS revealed b/l B-lines, significant dilated cardiomyopathy and poor contractility. In ED course patient was given ceftriaxone, azithromycin, given 3L IV fluid, and placed on BiPAP due to increased work of breathing. However, patient was not able to tolerate BiPAP, removed it, and desaturated to 73% on room air at which time patient was intubated. Patient is admitted for AHRF likely 2/2 to acute systolic HF. Extubated with plan for home hospice after d/c.

## 2024-11-22 NOTE — PROGRESS NOTE ADULT - ASSESSMENT
Patient is a 89 y/o M with PMH COPD, and prostate cancer (in remission s/p radiation) who presented with 2 day history of worsening shortness of breath, and confusion. As per daughter patient had been complaining of worsening shortness of breath and difficulty breathing for 2 days associated with LE swelling, and orthopnea. In the ED patient was given ceftriaxone, azithromycin, and given 3L IV fluid. Bedside POCUS revealed b/l B-lines, significant dilated cardiomyopathy and poor contractility. Patient is admitted for AHRF likely 2/2 to acute systolic HF.     #AHRF  #New onset acute systolic HF.   #elevated lactate  #COPD  #Cholelithiasis  #Transaminitis  #Hyponatremia    Plan:   NEURO:  #No active issues  - Extubated to nasal cannula    - GOC discussion held with patient and family by Palliative Dr. Smith, patient now DNR/DNI with plan for home hospice on discharge.     CARDIO:  #New onset acute systolic HF  - P/w shortness of breath, hypoxia, LE swelling. Family reports no known history of HF, but shares 4 week history of LE edema   - Pro-BNP  10,828  - Trop  PEAKED AT 60.6.   - EKG - NSR w/ left bundle branch block and PVCs.   - Bedside POCUS : VTI 12,  held and VTI repeated after 1 hour and remains unchanged  - continue lasix to 40mg IVP QD   - Monitor off dobutamine 2.5mg  - TTE  Left ventricular systolic function is severely decreased with a calculated ejection fraction of 18 % by the Mejias's biplane method of disks. There is global left ventricular hypokinesis. There is moderate (grade 2) left ventricular diastolic dysfunction.  - Cardiology consulted, Panchito recommending continuing medical management.     PULM:  #AHRF 2/2 to Acute CHF   - P/w shortness of breath, hypoxia, LE swelling.   - CTA chest: No PE, Cardiomegaly and Bilateral pleural effusions, left greater than right. Suggestive of cardiogenic failure. Reflux of contrast into the hepatic veins and intrahepatic IVC may suggest right-sided heart dysfunction. Focal bronchiectasis versus thick-walled cyst of the left lower lobe and focal bronchiectasis with associated wall thickening of the right lower lobe. This may be infectious or inflammatory in nature.   - extubated after SBT to nasal cannula    #COPD  - on home symbicort 80 mcg-4.5mcg 2 puffs Q12 and albuterol 1.25mg q6  - continue symbicort 2 puff daily  - will switch to duoneb q6 PRN for SOB/wheezing    GI:  #Cholelithiasis   -  CT A/P  : Cholelithiasis. Gallbladder wall thickening versus pericholecystic fluid is nonspecific in the setting of generalized edema.  - patient with benign abdominal exam, no cholestatic pattern on LFTs, no evidence of infection,   - will obtain collateral when patient able    #Transaminitis Likely 2/2 to hepatic congestion in the setting of acute decompensated CHF  - P/w ALP - 182, ALP - 151, AST - 200  - CT A/P : - Heterogeneous hepatic enhancement which may be related to congestion or underlying hepatic dysfunction. Correlate clinically.  - Trend LFTs daily    #nutrition  - NPO  - bedside swallow after extubation, and advance diet as able     RENAL:  #Hypervolemic hyponatremia 2/2 systolic heart failure  - Na 123 on admission now 131  - continue furosemide 40mg IV qd  - Stict I&O    #elevated lactate 2/2 new onset acute systolic heart failure  - P/w lactate 5.3>9.1>6.4>4.9>3.5  - repeat lactate 2pm    INFECTIOUS:  #No active issues  - WBC 7.9, afebrile, without evidence of infection,   - BCx NTD  - atypical w/u neg, sputum cx norm resp real   - will discontinue ceftriaxone and azithromycin, hypoxia likely related to new onset HF      HEMO:  #normocytic anemia  - Hgb 12.0 on admission, now 11.1  - likely in setting of critical illness  - monitor CBC daily  - transfuse for Hgb goal >7    #elevated INR  - INR 2.5 on admission > 1.7  - possibly due to hepatic congestion 2/2 heart failure  - monitor for s/s bleeding    ENDO:  #no active issues (A1c 5.6)    MSK/SKIN:  # Lower extremity DTI  - Podiatry consulted    PPX:  - DVT: lovenox  - GI: PPI       Dispo:  ICU  DNR/DNI     Patient is a 89 y/o M with PMH COPD, and prostate cancer (in remission s/p radiation) who presented with 2 day history of worsening shortness of breath, and confusion. As per daughter patient had been complaining of worsening shortness of breath and difficulty breathing for 2 days associated with LE swelling, and orthopnea. In the ED patient was given ceftriaxone, azithromycin, and given 3L IV fluid. Bedside POCUS revealed b/l B-lines, significant dilated cardiomyopathy and poor contractility. Patient is admitted for AHRF likely 2/2 to acute systolic HF.     #AHRF  #New onset acute systolic HF.   #elevated lactate  #COPD  #Cholelithiasis  #Transaminitis  #Hyponatremia    Plan:   NEURO:  #No active issues  - Extubated to nasal cannula    - GOC discussion held with patient and family by Palliative Dr. Smith, patient now DNR/DNI with plan for home hospice on discharge.     CARDIO:  #New onset acute systolic HF  - P/w shortness of breath, hypoxia, LE swelling. Family reports no known history of HF, but shares 4 week history of LE edema   - Pro-BNP  10,828  - Trop  PEAKED AT 60.6.   - EKG - NSR w/ left bundle branch block and PVCs.   - Bedside POCUS : VTI 12,  held and VTI repeated after 1 hour and remains unchanged  - continue lasix to 40mg IVP QD   - Monitor off dobutamine 2.5mg  - TTE  Left ventricular systolic function is severely decreased with a calculated ejection fraction of 18 % by the Mejias's biplane method of disks. There is global left ventricular hypokinesis. There is moderate (grade 2) left ventricular diastolic dysfunction.  - Cardiology consulted, Panchito recommending continuing medical management.     PULM:  #AHRF 2/2 to Acute CHF   - P/w shortness of breath, hypoxia, LE swelling.   - CTA chest: No PE, Cardiomegaly and Bilateral pleural effusions, left greater than right. Suggestive of cardiogenic failure. Reflux of contrast into the hepatic veins and intrahepatic IVC may suggest right-sided heart dysfunction. Focal bronchiectasis versus thick-walled cyst of the left lower lobe and focal bronchiectasis with associated wall thickening of the right lower lobe. This may be infectious or inflammatory in nature.   - extubated after SBT to nasal cannula    #COPD  - on home symbicort 80 mcg-4.5mcg 2 puffs Q12 and albuterol 1.25mg q6  - continue symbicort 2 puff daily    GI:  #Cholelithiasis   -  CT A/P  : Cholelithiasis. Gallbladder wall thickening versus pericholecystic fluid is nonspecific in the setting of generalized edema.  - patient with benign abdominal exam, no cholestatic pattern on LFTs, no evidence of infection,   - will obtain collateral when patient able    #Transaminitis Likely 2/2 to hepatic congestion in the setting of acute decompensated CHF  - P/w ALP - 182, ALP - 151, AST - 200  - CT A/P : - Heterogeneous hepatic enhancement which may be related to congestion or underlying hepatic dysfunction. Correlate clinically.  - Trend LFTs daily    #nutrition  - NPO with TF  - Pending SLP eval prior to starting diet    RENAL:  #Hypervolemic hyponatremia 2/2 systolic heart failure (resolved)  - Stict I&O  - Indwelling meredith catheter removed, f/u TOV     INFECTIOUS:  #No active issues  - WBC 7.9, afebrile, without evidence of infection,   - BCx NTD  - atypical w/u neg, sputum cx norm resp real   - will discontinue ceftriaxone and azithromycin, hypoxia likely related to new onset HF      HEMO:  #normocytic anemia  - Hgb 12.0 on admission, now 11.1  - likely in setting of critical illness  - monitor CBC daily  - transfuse for Hgb goal >7    ENDO:  #no active issues (A1c 5.6)    MSK/SKIN:  # Lower extremity DTI  - Podiatry consulted    PPX:  - DVT: lovenox  - GI: PPI       Dispo:  ICU  DNR/DNI

## 2024-11-22 NOTE — SWALLOW BEDSIDE ASSESSMENT ADULT - ASR SWALLOW DENTITION
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 Relapsed ph pos ALL...s/p re induction with inotuzimab..Achieved CR. 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 (son)\par 8/13/19 FISH for Y = 100% donor \par 8/29/19 Fish for Y 100% donor\par Pending post transplant BMbx\par \par 2) Heme\par Counts stable, no indication for transfusion\par  WBC 8.1 ANC 2.9 Hgb 12.4 \par Continue folic acid and multivitamin\par \par 3) ID\par Continue ppx:\par - Mepron 750 mg BID\par - Fluconazole 200 mg oral tablet -- decreased to 1 tab(s) by mouth once a day on 4/24/19\par Post transplant restrictions reviewed \par \par CMV viremia\par Acyclovir switched to Valcyte 450mg bid on 3/29/19\par Patient had mistakenly been taking acyclovir until 5/23/19, CMV PCR improved with starting Valcyte\par 8/13/19 CMV negative to date. Continue to monitor CMV PCR weekly\par Continue Valcyte 450mg bid, decreased as of 6/12/19\par \par 4) GVHD\par Skin 0 Liver 0 GI 0 - overall grade 0\par No signs of acute/chronic GVHD\par FK taper started on 9/12/19. 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 Continue lyrica.\par Hypomagnesia - magnesium oxide 400 mg tid\par Headaches - continue Keppra 500 mg bid\par Insomnia - continue PRN Ambien 5 mg q hs\par \par 8) Plan/Dispo\par Pt educated regarding plan of care, all questions/concerns addressed\par Instructed to contact our office with any new/worsening symptoms\par F/u in 2 weeks with NP Germaine \par \par  present and adequate

## 2024-11-22 NOTE — CHART NOTE - NSCHARTNOTEFT_GEN_A_CORE
89 y/o M with PMH COPD, prostate cancer (in remission s/p radiation), and recent pneumonia (compelted augmentin course) who presented with 2 day history of worsening shortness of breath, and confusion. In ED course patient was given ceftriaxone, azithromycin, given 3L IV fluid, and placed on BiPAP due to increased work of breathing. However, patient was not able to tolerate BiPAP, removed it, and desaturated to 73% on room air at which time patient was intubated. Labs were notable for a lactate of 5.3, hyponatremia, transaminitis, and BNP of 1038. Patient was admitted to ICU, bedside POCUS completed with grossly reduced LVEF and bialtera B-lines. Patient was started on 40mg IV lasix BID and dobutamine. TTE confirmed acute decompensated systiolic heart failure with  a severely reduced left ventricular systolic function. EF 18%, Grade II DD, and global LV hypokinesis. Good resposnce to diuresis with overall 4L net negative fluid balance. Successful SAT/SBT on 11/20 and patient extubated to nasal cannula. LOS net negative 8L. Dobutamine titrated off and lasix changed to lasix 40mg IV qd. Palliaitve consulted and patient made DNR  with plan for home hospice after discharge    GOC discussion held with patient and family by Palliative Dr. Smith, patient now DNR/intubate  with plan for home hospice on discharge.   GOC to be completed   Zeus Alvarez ([phone])  is coming to the hospital today             OBJECTIVE:  Vital Signs Last 24 Hrs  T(C): 37.1 (22 Nov 2024 15:56), Max: 38 (21 Nov 2024 22:00)  T(F): 98.8 (22 Nov 2024 15:56), Max: 100.4 (21 Nov 2024 22:00)  HR: 83 (22 Nov 2024 15:56) (78 - 99)  BP: 105/53 (22 Nov 2024 15:56) (87/67 - 110/71)  BP(mean): 83 (22 Nov 2024 13:00) (53 - 84)  RR: 18 (22 Nov 2024 17:28) (12 - 33)  SpO2: 100% (22 Nov 2024 17:28) (84% - 100%)        FOCUSED PHYSICAL EXAM:  Neuro: awake, alert, minimally verbal, follows simple commends   Cardiovascular: Pulses +2 B/L in lower and upper extremities, HR regular, BP stable, No edema.  Respiratory: Respirations regular, unlabored, breath souns decreased mickey   GI: Abdomen soft, NG tube in place   : no bladder distention noted. due to void         PLAN:       - DNR/ intubate:   -complete GOC discussion, HCP daughter Fabiola [phone]   - Nasal canula to keep oxygen sat >92%   - continue lasix to 40mg IVP QD   - on home symbicort 80 mcg-4.5mcg 2 puffs Q12 and albuterol 1.25mg q6  - continue symbicort 2 puff daily  - NPO with TF  - Indwelling meredith catheter removed, f/u TOV   - DVT: lovenox  - GI: PPI

## 2024-11-22 NOTE — PROGRESS NOTE ADULT - SUBJECTIVE AND OBJECTIVE BOX
INTERVAL HPI/OVERNIGHT EVENTS:       PRESSORS: [ ] YES [ ] NO  WHICH:    ANTIBIOTICS:                  DATE STARTED:  ANTIBIOTICS:                  DATE STARTED:    Antimicrobial:    Cardiovascular:  furosemide    Tablet 40 milliGRAM(s) Oral daily    Pulmonary:  albuterol/ipratropium for Nebulization 3 milliLiter(s) Nebulizer every 6 hours PRN    Hematalogic:  enoxaparin Injectable 30 milliGRAM(s) SubCutaneous every 24 hours    Other:  acetaminophen     Tablet .. 650 milliGRAM(s) Oral every 6 hours PRN  chlorhexidine 2% Cloths 1 Application(s) Topical <User Schedule>  melatonin 5 milliGRAM(s) Oral at bedtime  pantoprazole   Suspension 40 milliGRAM(s) Oral daily    acetaminophen     Tablet .. 650 milliGRAM(s) Oral every 6 hours PRN  albuterol/ipratropium for Nebulization 3 milliLiter(s) Nebulizer every 6 hours PRN  chlorhexidine 2% Cloths 1 Application(s) Topical <User Schedule>  enoxaparin Injectable 30 milliGRAM(s) SubCutaneous every 24 hours  furosemide    Tablet 40 milliGRAM(s) Oral daily  melatonin 5 milliGRAM(s) Oral at bedtime  pantoprazole   Suspension 40 milliGRAM(s) Oral daily    Drug Dosing Weight  Height (cm): 154.9 (18 Nov 2024 16:15)  Weight (kg): 53.6 (18 Nov 2024 23:43)  BMI (kg/m2): 22.3 (18 Nov 2024 23:43)  BSA (m2): 1.51 (18 Nov 2024 23:43)    PHYSICAL EXAM:  GENERAL: NAD  EYES: EOMI, PERRLA  NECK: Supple, No JVD; Normal thyroid; Trachea midline: No LAD   NERVOUS SYSTEM:  Alert & Oriented X3,  Motor Strength 5/5 B/L upper and lower extremities; DTRs 2+ intact and symmetric  CHEST/LUNG: No rales, rhonchi, wheezing, breath sounds present bilaterally  HEART: Regular rate and rhythm; No murmurs, no gallops  ABDOMEN: Soft, Nontender, Nondistended; Bowel sounds present, no pain or masses on palpation  : voiding well, Roth in place  EXTREMITIES:  2+ Peripheral Pulses, No clubbing, cyanosis, or edema  SKIN: warm, intact, no lesions     LINES/DRAINS/DEVICES  CENTRAL LINE: [ ] YES [ ] NO  LOCATION:     ROTH: [ ] YES [ ] NO     A-LINE:  [ ] YES [ ] NO  LOCATION:       ICU Vital Signs Last 24 Hrs  T(C): 37.2 (22 Nov 2024 07:00), Max: 38 (21 Nov 2024 22:00)  T(F): 99 (22 Nov 2024 07:00), Max: 100.4 (21 Nov 2024 22:00)  HR: 90 (22 Nov 2024 07:00) (82 - 99)  BP: 102/61 (22 Nov 2024 07:00) (83/58 - 120/62)  BP(mean): 74 (22 Nov 2024 07:00) (53 - 84)  ABP: --  ABP(mean): --  RR: 17 (22 Nov 2024 07:00) (12 - 33)  SpO2: 100% (22 Nov 2024 07:00) (96% - 100%)    O2 Parameters below as of 21 Nov 2024 20:00  Patient On (Oxygen Delivery Method): nasal cannula  O2 Flow (L/min): 2          ABG - ( 20 Nov 2024 11:25 )  pH, Arterial: 7.52  pH, Blood: x     /  pCO2: 34    /  pO2: 123   / HCO3: 28    / Base Excess: 5.1   /  SaO2: 99                    11-21 @ 07:01  -  11-22 @ 07:00  --------------------------------------------------------  IN: 837 mL / OUT: 1750 mL / NET: -913 mL              LABS:  CBC Full  -  ( 22 Nov 2024 04:18 )  WBC Count : 13.50 K/uL  RBC Count : 3.64 M/uL  Hemoglobin : 10.3 g/dL  Hematocrit : 30.0 %  Platelet Count - Automated : 142 K/uL  Mean Cell Volume : 82.4 fl  Mean Cell Hemoglobin : 28.3 pg  Mean Cell Hemoglobin Concentration : 34.3 g/dL  Auto Neutrophil # : x  Auto Lymphocyte # : x  Auto Monocyte # : x  Auto Eosinophil # : x  Auto Basophil # : x  Auto Neutrophil % : x  Auto Lymphocyte % : x  Auto Monocyte % : x  Auto Eosinophil % : x  Auto Basophil % : x    11-22    137  |  103  |  21[H]  ----------------------------<  180[H]  4.2   |  32[H]  |  0.58    Ca    8.6      22 Nov 2024 04:18  Phos  1.4     11-22  Mg     2.3     11-22    TPro  5.4[L]  /  Alb  2.3[L]  /  TBili  2.6[H]  /  DBili  x   /  AST  61[H]  /  ALT  112[H]  /  AlkPhos  120  11-22      Urinalysis Basic - ( 22 Nov 2024 04:18 )    Color: x / Appearance: x / SG: x / pH: x  Gluc: 180 mg/dL / Ketone: x  / Bili: x / Urobili: x   Blood: x / Protein: x / Nitrite: x   Leuk Esterase: x / RBC: x / WBC x   Sq Epi: x / Non Sq Epi: x / Bacteria: x      Culture Results:   Commensal real consistent with body site (11-20 @ 16:00)      RADIOLOGY & ADDITIONAL STUDIES REVIEWED DURING TEAM ROUNDS     INTERVAL HPI/OVERNIGHT EVENTS:   No acute events overnight.     PRESSORS: [ ] YES [X] NO  WHICH:    ANTIBIOTICS:                  DATE STARTED:  ANTIBIOTICS:                  DATE STARTED:    Antimicrobial:    Cardiovascular:  furosemide    Tablet 40 milliGRAM(s) Oral daily    Pulmonary:  albuterol/ipratropium for Nebulization 3 milliLiter(s) Nebulizer every 6 hours PRN    Hematalogic:  enoxaparin Injectable 30 milliGRAM(s) SubCutaneous every 24 hours    Other:  acetaminophen     Tablet .. 650 milliGRAM(s) Oral every 6 hours PRN  chlorhexidine 2% Cloths 1 Application(s) Topical <User Schedule>  melatonin 5 milliGRAM(s) Oral at bedtime  pantoprazole   Suspension 40 milliGRAM(s) Oral daily    acetaminophen     Tablet .. 650 milliGRAM(s) Oral every 6 hours PRN  albuterol/ipratropium for Nebulization 3 milliLiter(s) Nebulizer every 6 hours PRN  chlorhexidine 2% Cloths 1 Application(s) Topical <User Schedule>  enoxaparin Injectable 30 milliGRAM(s) SubCutaneous every 24 hours  furosemide    Tablet 40 milliGRAM(s) Oral daily  melatonin 5 milliGRAM(s) Oral at bedtime  pantoprazole   Suspension 40 milliGRAM(s) Oral daily    Drug Dosing Weight  Height (cm): 154.9 (18 Nov 2024 16:15)  Weight (kg): 53.6 (18 Nov 2024 23:43)  BMI (kg/m2): 22.3 (18 Nov 2024 23:43)  BSA (m2): 1.51 (18 Nov 2024 23:43)    PHYSICAL EXAM:  GENERAL: elderly, cachetic appearing male,    EYES: PERRL  NECK: Supple, No JVD; Trachea midline  NERVOUS SYSTEM: Awake and alert, following commands appropriately.   CHEST/LUNG: Diminished breath sounds bilaterally.   HEART: Regular rate and rhythm; No murmurs, rubs, or gallops  ABDOMEN: Soft, Nondistended; Bowel sounds present, no pain or masses on palpation  : voiding well, Roth in place  EXTREMITIES:  2+ Peripheral Pulses, +4 b/l lower ext    LINES/DRAINS/DEVICES  CENTRAL LINE: [ ] YES [X] NO  LOCATION:     ROTH: [ ] YES [X] NO     A-LINE:  [ ] YES [X] NO  LOCATION:       ICU Vital Signs Last 24 Hrs  T(C): 37.2 (22 Nov 2024 07:00), Max: 38 (21 Nov 2024 22:00)  T(F): 99 (22 Nov 2024 07:00), Max: 100.4 (21 Nov 2024 22:00)  HR: 90 (22 Nov 2024 07:00) (82 - 99)  BP: 102/61 (22 Nov 2024 07:00) (83/58 - 120/62)  BP(mean): 74 (22 Nov 2024 07:00) (53 - 84)  ABP: --  ABP(mean): --  RR: 17 (22 Nov 2024 07:00) (12 - 33)  SpO2: 100% (22 Nov 2024 07:00) (96% - 100%)    O2 Parameters below as of 21 Nov 2024 20:00  Patient On (Oxygen Delivery Method): nasal cannula  O2 Flow (L/min): 2      ABG - ( 20 Nov 2024 11:25 )  pH, Arterial: 7.52  pH, Blood: x     /  pCO2: 34    /  pO2: 123   / HCO3: 28    / Base Excess: 5.1   /  SaO2: 99            11-21 @ 07:01  -  11-22 @ 07:00  --------------------------------------------------------  IN: 837 mL / OUT: 1750 mL / NET: -913 mL        LABS:  CBC Full  -  ( 22 Nov 2024 04:18 )  WBC Count : 13.50 K/uL  RBC Count : 3.64 M/uL  Hemoglobin : 10.3 g/dL  Hematocrit : 30.0 %  Platelet Count - Automated : 142 K/uL  Mean Cell Volume : 82.4 fl  Mean Cell Hemoglobin : 28.3 pg  Mean Cell Hemoglobin Concentration : 34.3 g/dL  Auto Neutrophil # : x  Auto Lymphocyte # : x  Auto Monocyte # : x  Auto Eosinophil # : x  Auto Basophil # : x  Auto Neutrophil % : x  Auto Lymphocyte % : x  Auto Monocyte % : x  Auto Eosinophil % : x  Auto Basophil % : x    11-22    137  |  103  |  21[H]  ----------------------------<  180[H]  4.2   |  32[H]  |  0.58    Ca    8.6      22 Nov 2024 04:18  Phos  1.4     11-22  Mg     2.3     11-22    TPro  5.4[L]  /  Alb  2.3[L]  /  TBili  2.6[H]  /  DBili  x   /  AST  61[H]  /  ALT  112[H]  /  AlkPhos  120  11-22      Urinalysis Basic - ( 22 Nov 2024 04:18 )    Color: x / Appearance: x / SG: x / pH: x  Gluc: 180 mg/dL / Ketone: x  / Bili: x / Urobili: x   Blood: x / Protein: x / Nitrite: x   Leuk Esterase: x / RBC: x / WBC x   Sq Epi: x / Non Sq Epi: x / Bacteria: x      Culture Results:   Commensal real consistent with body site (11-20 @ 16:00)      RADIOLOGY & ADDITIONAL STUDIES REVIEWED DURING TEAM ROUNDS

## 2024-11-22 NOTE — PROGRESS NOTE ADULT - SUBJECTIVE AND OBJECTIVE BOX
Cardiology Progress Note  ------------------------------------------------------------------------------------------  SUBJECTIVE:   No events overnight. Denies CP, SOB or Palpitations.   -------------------------------------------------------------------------------------------  ROS:  CV: chest pain (-), palpitation (-), orthopnea (-), PND (-), edema (-)  PULM: SOB (-), cough (-), wheezing (-), hemoptysis (-).   CONST: fever (-), chills (-) or fatigue (-)  GI: abdominal distension (-), abdominal pain (-) , nausea/vomiting (-), hematemesis, (-), melena (-), hematochezia (-)  : dysuria (-), frequency (-), hematuria (-).   NEURO: numbness (-), weakness (-), dizziness (-)  MSK: myalgia (-), joint pain (-)   SKIN: itching (-), rash (-)  HEENT:  visual changes (-); vertigo or throat pain (-);  neck stiffness (-)   Psych: change in mood (-), anxiety (-), depression (-)     All other review of systems is negative unless indicated above.   -------------------------------------------------------------------------------------------  VS:  T(F): 97.5 (11-22), Max: 100.4 (11-21)  HR: 90 (11-22) (78 - 99)  BP: 100/60 (11-22) (84/63 - 110/71)  RR: 20 (11-22)  SpO2: 92% (11-22)  I&O's Summary    21 Nov 2024 07:01  -  22 Nov 2024 07:00  --------------------------------------------------------  IN: 837 mL / OUT: 1750 mL / NET: -913 mL      ------------------------------------------------------------------------------------------  PHYSICAL EXAM:  General: No acute distress. Awake and conversant.   Eyes: Normal conjunctiva, anicteric. Round symmetric pupils.   ENT: Hearing grossly intact. No nasal discharge.   Neck: Neck is supple. No masses or thyromegaly.   Respiratory: Respirations are non-labored. No wheezing.   Skin: Warm. No rashes or ulcers.   Psych: Alert and oriented. Cooperative, Appropriate mood and affect, Normal judgment.   CV: No lower extremity edema.   MSK: Normal ambulation. No clubbing or cyanosis.   Neuro: Sensation and CN II-XII grossly normal.  -------------------------------------------------------------------------------------------  LABS:  11-22    137  |  103  |  21[H]  ----------------------------<  180[H]  4.2   |  32[H]  |  0.58    Ca    8.6      22 Nov 2024 04:18  Phos  1.4     11-22  Mg     2.3     11-22    TPro  5.4[L]  /  Alb  2.3[L]  /  TBili  2.6[H]  /  DBili  x   /  AST  61[H]  /  ALT  112[H]  /  AlkPhos  120  11-22    Creatinine Trend: 0.58<--, 0.67<--, 0.75<--, 0.55<--, 0.85<--, 1.06<--                        10.3   13.50 )-----------( 142      ( 22 Nov 2024 04:18 )             30.0         Lipid Panel: T(F): 97.5 (11-22), Max: 100.4 (11-21)  HR: 90 (11-22) (78 - 99)  BP: 100/60 (11-22) (84/63 - 110/71)  RR: 20 (11-22)  SpO2: 92% (11-22)  Cardiac Enzymes:         -------------------------------------------------------------------------------------------  Meds:  acetaminophen     Tablet .. 650 milliGRAM(s) Oral every 6 hours PRN  albuterol/ipratropium for Nebulization 3 milliLiter(s) Nebulizer every 6 hours PRN  chlorhexidine 2% Cloths 1 Application(s) Topical <User Schedule>  enoxaparin Injectable 30 milliGRAM(s) SubCutaneous every 24 hours  fluticasone propionate/ salmeterol 100-50 MICROgram(s) Diskus 1 Dose(s) Inhalation two times a day  furosemide   Injectable 40 milliGRAM(s) IV Push daily  melatonin 5 milliGRAM(s) Oral at bedtime  pantoprazole   Suspension 40 milliGRAM(s) Oral daily  polyethylene glycol 3350 17 Gram(s) Oral daily    -------------------------------------------------------------------------------------------  Cardiovascular Diagnostic Testing:  -------------------------------------------------------------------------------------------  ECG:     Echo:     Stress Testing:    Cath:    Imaging:    CXR:  reviewed  -------------------------------------------------------------------------------------------  Assessment and Plan:   -------------------------------------------------------------------------------------------  Problems Assessed:   1. Cardiogenic Shock 2/2 acutely decompensated HFrEF  - improved, off dobutamine.   - trial of losartan 12.5 mg daily if patient tolerates.   - TTE preliminarily shows severely decreased LV dysfunction with EF 10-15% on my review.   - continue IV diuresis.   2. Transaminitis- improved.   3. Hyponatremia - improved.   4. Acute hypoxic respiratory failure: resolved.     -------------------------------------------------------------------------------------------  Billing Statement:   51 minutes spent on total encounter. Necessity of time spent during this encounter on this date of service was due to review of medical information in EMR, co-ordination of hospital and outpatient care, discussion with patient and communication with primary team.   -------------------------------------------------------------------------------------------  Kenny Flanagan MD   of Cardiology  [facility] at [address], [address] [place], [place] [id number]  Phone: [phone]  Fax: [phone]    Please check [email or web address]: [email or web address] for cardiology service schedule and contact information.  Cardiology Progress Note  ------------------------------------------------------------------------------------------  SUBJECTIVE:   No events overnight. Denies CP, SOB or Palpitations.   -------------------------------------------------------------------------------------------  ROS:  CV: chest pain (-), palpitation (-), orthopnea (-), PND (-), edema (-)  PULM: SOB (-), cough (-), wheezing (-), hemoptysis (-).   CONST: fever (-), chills (-) or fatigue (-)  GI: abdominal distension (-), abdominal pain (-) , nausea/vomiting (-), hematemesis, (-), melena (-), hematochezia (-)  : dysuria (-), frequency (-), hematuria (-).   NEURO: numbness (-), weakness (-), dizziness (-)  MSK: myalgia (-), joint pain (-)   SKIN: itching (-), rash (-)  HEENT:  visual changes (-); vertigo or throat pain (-);  neck stiffness (-)   Psych: change in mood (-), anxiety (-), depression (-)     All other review of systems is negative unless indicated above.   -------------------------------------------------------------------------------------------  VS:  T(F): 97.5 (11-22), Max: 100.4 (11-21)  HR: 90 (11-22) (78 - 99)  BP: 100/60 (11-22) (84/63 - 110/71)  RR: 20 (11-22)  SpO2: 92% (11-22)  I&O's Summary    21 Nov 2024 07:01  -  22 Nov 2024 07:00  --------------------------------------------------------  IN: 837 mL / OUT: 1750 mL / NET: -913 mL      ------------------------------------------------------------------------------------------  PHYSICAL EXAM:  General: No acute distress. Elderly, cachectic.   Eyes: Normal conjunctiva, anicteric. Round symmetric pupils.   ENT: Hearing grossly intact. No nasal discharge.   Neck: Neck is supple. No masses or thyromegaly.   Respiratory: Respirations are non-labored.   Skin: Warm. No rashes or ulcers.   CV: No lower extremity edema.   MSK: Normal ambulation. No clubbing or cyanosis.   Neuro: Sensation and CN II-XII grossly normal.  -------------------------------------------------------------------------------------------  LABS:  11-22    137  |  103  |  21[H]  ----------------------------<  180[H]  4.2   |  32[H]  |  0.58    Ca    8.6      22 Nov 2024 04:18  Phos  1.4     11-22  Mg     2.3     11-22    TPro  5.4[L]  /  Alb  2.3[L]  /  TBili  2.6[H]  /  DBili  x   /  AST  61[H]  /  ALT  112[H]  /  AlkPhos  120  11-22    Creatinine Trend: 0.58<--, 0.67<--, 0.75<--, 0.55<--, 0.85<--, 1.06<--                        10.3   13.50 )-----------( 142      ( 22 Nov 2024 04:18 )             30.0         Lipid Panel: T(F): 97.5 (11-22), Max: 100.4 (11-21)  HR: 90 (11-22) (78 - 99)  BP: 100/60 (11-22) (84/63 - 110/71)  RR: 20 (11-22)  SpO2: 92% (11-22)  Cardiac Enzymes:         -------------------------------------------------------------------------------------------  Meds:  acetaminophen     Tablet .. 650 milliGRAM(s) Oral every 6 hours PRN  albuterol/ipratropium for Nebulization 3 milliLiter(s) Nebulizer every 6 hours PRN  chlorhexidine 2% Cloths 1 Application(s) Topical <User Schedule>  enoxaparin Injectable 30 milliGRAM(s) SubCutaneous every 24 hours  fluticasone propionate/ salmeterol 100-50 MICROgram(s) Diskus 1 Dose(s) Inhalation two times a day  furosemide   Injectable 40 milliGRAM(s) IV Push daily  melatonin 5 milliGRAM(s) Oral at bedtime  pantoprazole   Suspension 40 milliGRAM(s) Oral daily  polyethylene glycol 3350 17 Gram(s) Oral daily    -------------------------------------------------------------------------------------------  Cardiovascular Diagnostic Testing:  -------------------------------------------------------------------------------------------  ECG:     Echo:     Stress Testing:    Cath:    Imaging:    CXR:  reviewed  -------------------------------------------------------------------------------------------  Assessment and Plan:   -------------------------------------------------------------------------------------------  Problems Assessed:   1. Cardiogenic Shock 2/2 acutely decompensated HFrEF  - improved, off dobutamine.   - trial of losartan 12.5 mg daily if patient tolerates.   - TTE preliminarily shows severely decreased LV dysfunction with EF 10-15% on my review.   - continue IV diuresis.   2. Transaminitis- improved.   3. Hyponatremia - improved.   4. Acute hypoxic respiratory failure: resolved.     -------------------------------------------------------------------------------------------  Billing Statement:   51 minutes spent on total encounter. Necessity of time spent during this encounter on this date of service was due to review of medical information in EMR, co-ordination of hospital and outpatient care, discussion with patient and communication with primary team.   -------------------------------------------------------------------------------------------  Kenny Flanagan MD   of Cardiology  [facility]  [address], [address] [place] [address]  Phone: [phone]  Fax: [phone]    Please check [email or web address]: [email or web address] for cardiology service schedule and contact information.

## 2024-11-22 NOTE — SWALLOW BEDSIDE ASSESSMENT ADULT - COMMENTS
Pt A+Ox0-1, on RA, fatigued, but arousable to auditory cues, repositioning & sternal rubs. Nonverbal, HOB elevated to 90°.   Downgrade from ICU to now 5N.

## 2024-11-22 NOTE — CHART NOTE - NSCHARTNOTEFT_GEN_A_CORE
Patient is a 89 y/o M with PMH COPD, prostate cancer (in remission s/p radiation), and recent pneumonia (completed augmentin course) who presented with 2 day history of worsening shortness of breath, and confusion. In ED course patient was given ceftriaxone, azithromycin, given 3L IV fluid, and placed on BiPAP due to increased work of breathing. However, patient was not able to tolerate BiPAP, removed it, and desaturated to 73% on room air at which time patient was intubated. Labs were notable for a lactate of 5.3, hyponatremia, transaminitis, and BNP of 1038. Patient was admitted to ICU, bedside POCUS completed with grossly reduced LVEF and bilateral B-lines. Patient was started on 40mg IV lasix BID and dobutamine. TTE confirmed acute decompensated systolic heart failure with  a severely reduced left ventricular systolic function. EF 18%, Grade II DD, and global LV hypokinesis. Good resposnce to diuresis with overall 4L net negative fluid balance. Successful SAT/SBT on 11/20 and patient extubated to nasal cannula. LOS net negative 8L. Dobutamine titrated off and lasix changed to lasix 40mg IV qd. Palliative consulted and patient made DNR/DNI with plan for home hospice once medically stable. Patient signed out to Dr. Ansari and Dr. Pagan.    For follow-up:  [ ] F/u RUQ US   [ ] Pending SLP eval   [ ] Continue TFs until diet is advance  [ ] SW eval for home hospice   [ ] Hilliard Removed, f/u TOV Patient is a 89 y/o M with PMH COPD, prostate cancer (in remission s/p radiation), and recent pneumonia (completed augmentin course) who presented with 2 day history of worsening shortness of breath, and confusion. In ED course patient was given ceftriaxone, azithromycin, given 3L IV fluid, and placed on BiPAP due to increased work of breathing. However, patient was not able to tolerate BiPAP, removed it, and desaturated to 73% on room air at which time patient was intubated. Labs were notable for a lactate of 5.3, hyponatremia, transaminitis, and BNP of 1038. Patient was admitted to ICU, bedside POCUS completed with grossly reduced LVEF and bilateral B-lines. Patient was started on 40mg IV lasix BID and dobutamine. TTE confirmed acute decompensated systolic heart failure with  a severely reduced left ventricular systolic function. EF 18%, Grade II DD, and global LV hypokinesis. Good resposnce to diuresis with overall 4L net negative fluid balance. Successful SAT/SBT on 11/20 and patient extubated to nasal cannula. LOS net negative 8L. Dobutamine titrated off and lasix changed to lasix 40mg IV qd. Palliative consulted and patient made DNR/DNI with plan for home hospice once medically stable. Patient signed out to Dr. Ansari and NP Hortensia.    For follow-up:  [ ] F/u RUQ US   [ ] Pending SLP eval   [ ] Continue TFs until diet is advance  [ ] SW eval for home hospice   [ ] Hilliard Removed, f/u TOV

## 2024-11-22 NOTE — SWALLOW BEDSIDE ASSESSMENT ADULT - NS SPL SWALLOW CLINIC TRIAL FT
Trial of ice chips. Alerted but did not initiate oral action for >30 secs. Weak lingual pumping action noted during attempts at bolus formation & oral transit >1 min. Pt fell asleep w/ PO, in oral cavity, despite maximal multimodal prompts. All bolus removed. SpO2 reading taken, and was 83%. RN Amaris alerted; Pt placed on NRB. AMS & pt swallow profile place patient at risk for aspiration. Pt is not a candidate for PO feeding at this time.

## 2024-11-22 NOTE — SWALLOW BEDSIDE ASSESSMENT ADULT - ADDITIONAL RECOMMENDATIONS
+ Be Free Water Protocol: Pt may have ice chips any time ONLY after oral care is completed, for QOL.

## 2024-11-23 NOTE — DISCHARGE NOTE PROVIDER - DETAILS OF MALNUTRITION DIAGNOSIS/DIAGNOSES
This patient has been assessed with a concern for Malnutrition and was treated during this hospitalization for the following Nutrition diagnosis/diagnoses:     -  11/21/2024: Severe protein-calorie malnutrition

## 2024-11-23 NOTE — PROGRESS NOTE ADULT - ASSESSMENT
89M  with h/o COPD, prostate cancer s/p RT was said to have presented to the ED with worsening SOB associated with leg swelling and in the ED he was placed on bipap and thereafter intubated and placed on mechanical ventilation. Extubated and downgraded to floor. Palliative assessed and plan for home hospice.    #Acute respiratory failure from likely decompensated heart failure s/p intubation  #Acute decompensated heart failure   #Bilateral pleural effusions from heart failure  #Lactic acidosis likely Type A  #Hyponatremia  #GB stones with wall thickening likely edema  #LL nodule  #H/o COPD  #H/o prostate cancer s/p RT    Extubated 11/20  IV lasix 40mg daily - c/w  No obvious signs of infection, will observe off antibiotics  Unable to initiate GDMT given low BP   Dysphagia screening passed - advance diet - aspiration precaution  DVT/GI prophylaxis  - pending home hospice instatement

## 2024-11-23 NOTE — DISCHARGE NOTE PROVIDER - HOSPITAL COURSE
89 y/o M with PMH COPD, prostate cancer (in remission s/p radiation), and recent pneumonia (completed Augmentin course) who presented with 2 day history of worsening shortness of breath, and confusion. In ED course patient was given ceftriaxone, azithromycin, given 3L IV fluid, and placed on BiPAP due to increased work of breathing. However, patient was not able to tolerate BiPAP, removed it, and desaturated to 73% on room air at which time patient was intubated. Labs were notable for a lactate of 5.3, hyponatremia, transaminitis, and BNP of 1038. Bedside POCUS completed with grossly reduced LVEF and bilateral B-lines. Patient was admitted to ICU for AHRF 2/2 acute systolic HF. Patient was started on 40mg IV lasix BID and dobutamine. TTE confirmed acute decompensated systolic heart failure with  a severely reduced left ventricular systolic function. EF 18%, Grade II DD, and global LV hypokinesis. Pt exhibited a good response to diuresis with overall 4L net negative fluid balance. Successful SAT/SBT on 11/20 and patient extubated to nasal cannula.  Dobutamine titrated off and lasix changed to lasix 40mg IV qd. Palliative consulted and patient made DNR/DNI with plan for home hospice once medically stable.    incomplete-----11/23

## 2024-11-23 NOTE — DISCHARGE NOTE PROVIDER - NSDCMRMEDTOKEN_GEN_ALL_CORE_FT
albuterol 1.25 mg/3 mL (0.042%) inhalation solution: 3 milliliter(s) by nebulizer every 6 hours  Symbicort 80 mcg-4.5 mcg/inh inhalation aerosol: 2 puff(s) inhaled every 12 hours

## 2024-11-23 NOTE — PROGRESS NOTE ADULT - SUBJECTIVE AND OBJECTIVE BOX
Cardiology Progress Note  ------------------------------------------------------------------------------------------  SUBJECTIVE:   No events overnight. Denies CP, SOB or Palpitations.   -------------------------------------------------------------------------------------------  ROS:  CV: chest pain (-), palpitation (-), orthopnea (-), PND (-), edema (-)  PULM: SOB (-), cough (-), wheezing (-), hemoptysis (-).   CONST: fever (-), chills (-) or fatigue (-)  GI: abdominal distension (-), abdominal pain (-) , nausea/vomiting (-), hematemesis, (-), melena (-), hematochezia (-)  : dysuria (-), frequency (-), hematuria (-).   NEURO: numbness (-), weakness (-), dizziness (-)  MSK: myalgia (-), joint pain (-)   SKIN: itching (-), rash (-)  HEENT:  visual changes (-); vertigo or throat pain (-);  neck stiffness (-)   Psych: change in mood (-), anxiety (-), depression (-)     All other review of systems is negative unless indicated above.   -------------------------------------------------------------------------------------------  VS:  T(F): 97.9 (11-23), Max: 98.8 (11-22)  HR: 93 (11-23) (83 - 94)  BP: 102/64 (11-23) (99/56 - 105/59)  RR: 18 (11-23)  SpO2: 100% (11-23)  I&O's Summary    22 Nov 2024 07:01 - 23 Nov 2024 07:00  --------------------------------------------------------  IN: 0 mL / OUT: 775 mL / NET: -775 mL    23 Nov 2024 07:01  -  23 Nov 2024 14:13  --------------------------------------------------------  IN: 418 mL / OUT: 600 mL / NET: -182 mL      ------------------------------------------------------------------------------------------  PHYSICAL EXAM:  General: No acute distress. Awake and conversant.   Eyes: Normal conjunctiva, anicteric. Round symmetric pupils.   ENT: Hearing grossly intact. No nasal discharge.   Neck: Neck is supple. No masses or thyromegaly.   Respiratory: Respirations are non-labored. No wheezing.   Skin: Warm. No rashes or ulcers.   Psych: Alert and oriented. Cooperative, Appropriate mood and affect, Normal judgment.   CV: No lower extremity edema.   MSK: Normal ambulation. No clubbing or cyanosis.   Neuro: Sensation and CN II-XII grossly normal.  -------------------------------------------------------------------------------------------  LABS:  11-23    138  |  101  |  25[H]  ----------------------------<  196[H]  3.2[L]   |  34[H]  |  0.64    Ca    8.8      23 Nov 2024 09:50  Phos  2.0     11-23  Mg     2.1     11-23    TPro  5.8[L]  /  Alb  2.5[L]  /  TBili  2.6[H]  /  DBili  x   /  AST  36  /  ALT  87[H]  /  AlkPhos  111  11-23    Creatinine Trend: 0.64<--, 0.58<--, 0.67<--, 0.75<--, 0.55<--, 0.85<--                        10.2   11.85 )-----------( 133      ( 23 Nov 2024 09:50 )             30.5         Lipid Panel: T(F): 97.9 (11-23), Max: 98.8 (11-22)  HR: 93 (11-23) (83 - 94)  BP: 102/64 (11-23) (99/56 - 105/59)  RR: 18 (11-23)  SpO2: 100% (11-23)  Cardiac Enzymes:         -------------------------------------------------------------------------------------------  Meds:  acetaminophen     Tablet .. 650 milliGRAM(s) Oral every 6 hours PRN  albuterol/ipratropium for Nebulization 3 milliLiter(s) Nebulizer every 6 hours PRN  enoxaparin Injectable 30 milliGRAM(s) SubCutaneous every 24 hours  fluticasone propionate/ salmeterol 100-50 MICROgram(s) Diskus 1 Dose(s) Inhalation two times a day  furosemide   Injectable 40 milliGRAM(s) IV Push daily  melatonin 5 milliGRAM(s) Oral at bedtime  pantoprazole   Suspension 40 milliGRAM(s) Oral daily  polyethylene glycol 3350 17 Gram(s) Oral daily    -------------------------------------------------------------------------------------------  Cardiovascular Diagnostic Testing:  -------------------------------------------------------------------------------------------  ECG:     Echo:     Stress Testing:    Cath:    Imaging:    CXR:  reviewed  -------------------------------------------------------------------------------------------  Assessment and Plan:   -------------------------------------------------------------------------------------------  Problems Assessed:   1. Cardiogenic Shock 2/2 acutely decompensated HFrEF  - resolved, off dobutamine.   - TTE preliminarily shows severely decreased LV dysfunction with EF 10-15%  2. Transaminitis- improved.   3. Hyponatremia - improved.   4. Acute hypoxic respiratory failure: resolved.   5. HFrEF  - Lasix 40 mg IV daily  - trial of losartan 12.5 mg daily if patient tolerates.   - awaiting home hospice.   -------------------------------------------------------------------------------------------  Billing Statement:   51 minutes spent on total encounter. Necessity of time spent during this encounter on this date of service was due to review of medical information in EMR, co-ordination of hospital and outpatient care, discussion with patient and communication with primary team.   -------------------------------------------------------------------------------------------  Kenny Flanagan MD   of Cardiology  [facility]  [address], [address]  [place] [id number]  Phone: [phone]  Fax: [phone]    Please check [email or web address]: [email or web address]" for cardiology service schedule and contact information.  Cardiology Progress Note  ------------------------------------------------------------------------------------------  SUBJECTIVE:   No events overnight. Denies CP, SOB or Palpitations.   -------------------------------------------------------------------------------------------  ROS:  CV: chest pain (-), palpitation (-), orthopnea (-), PND (-), edema (-)  PULM: SOB (-), cough (-), wheezing (-), hemoptysis (-).   CONST: fever (-), chills (-) or fatigue (-)  GI: abdominal distension (-), abdominal pain (-) , nausea/vomiting (-), hematemesis, (-), melena (-), hematochezia (-)  : dysuria (-), frequency (-), hematuria (-).   NEURO: numbness (-), weakness (-), dizziness (-)  MSK: myalgia (-), joint pain (-)   SKIN: itching (-), rash (-)  HEENT:  visual changes (-); vertigo or throat pain (-);  neck stiffness (-)   Psych: change in mood (-), anxiety (-), depression (-)     All other review of systems is negative unless indicated above.   -------------------------------------------------------------------------------------------  VS:  T(F): 97.9 (11-23), Max: 98.8 (11-22)  HR: 93 (11-23) (83 - 94)  BP: 102/64 (11-23) (99/56 - 105/59)  RR: 18 (11-23)  SpO2: 100% (11-23)  I&O's Summary    22 Nov 2024 07:01 - 23 Nov 2024 07:00  --------------------------------------------------------  IN: 0 mL / OUT: 775 mL / NET: -775 mL    23 Nov 2024 07:01  -  23 Nov 2024 14:13  --------------------------------------------------------  IN: 418 mL / OUT: 600 mL / NET: -182 mL      ------------------------------------------------------------------------------------------  PHYSICAL EXAM:  General: No acute distress. Awake and conversant.   Eyes: Normal conjunctiva, anicteric. Round symmetric pupils.   ENT: Hearing grossly intact. No nasal discharge.   Neck: Neck is supple. No masses or thyromegaly.   Respiratory: Respirations are non-labored. No wheezing.   Skin: Warm. No rashes or ulcers.   Psych: Alert and oriented. Cooperative, Appropriate mood and affect, Normal judgment.   CV: No lower extremity edema.   MSK: Normal ambulation. No clubbing or cyanosis.   Neuro: Sensation and CN II-XII grossly normal.  -------------------------------------------------------------------------------------------  LABS:  11-23    138  |  101  |  25[H]  ----------------------------<  196[H]  3.2[L]   |  34[H]  |  0.64    Ca    8.8      23 Nov 2024 09:50  Phos  2.0     11-23  Mg     2.1     11-23    TPro  5.8[L]  /  Alb  2.5[L]  /  TBili  2.6[H]  /  DBili  x   /  AST  36  /  ALT  87[H]  /  AlkPhos  111  11-23    Creatinine Trend: 0.64<--, 0.58<--, 0.67<--, 0.75<--, 0.55<--, 0.85<--                        10.2   11.85 )-----------( 133      ( 23 Nov 2024 09:50 )             30.5         Lipid Panel: T(F): 97.9 (11-23), Max: 98.8 (11-22)  HR: 93 (11-23) (83 - 94)  BP: 102/64 (11-23) (99/56 - 105/59)  RR: 18 (11-23)  SpO2: 100% (11-23)  Cardiac Enzymes:         -------------------------------------------------------------------------------------------  Meds:  acetaminophen     Tablet .. 650 milliGRAM(s) Oral every 6 hours PRN  albuterol/ipratropium for Nebulization 3 milliLiter(s) Nebulizer every 6 hours PRN  enoxaparin Injectable 30 milliGRAM(s) SubCutaneous every 24 hours  fluticasone propionate/ salmeterol 100-50 MICROgram(s) Diskus 1 Dose(s) Inhalation two times a day  furosemide   Injectable 40 milliGRAM(s) IV Push daily  melatonin 5 milliGRAM(s) Oral at bedtime  pantoprazole   Suspension 40 milliGRAM(s) Oral daily  polyethylene glycol 3350 17 Gram(s) Oral daily    -------------------------------------------------------------------------------------------  Cardiovascular Diagnostic Testing:  -------------------------------------------------------------------------------------------  ECG:     Echo:     Stress Testing:    Cath:    Imaging:    CXR:  reviewed  -------------------------------------------------------------------------------------------  Assessment and Plan:   -------------------------------------------------------------------------------------------  Problems Assessed:   1. Cardiogenic Shock 2/2 acutely decompensated HFrEF  - resolved, off dobutamine.   - TTE preliminarily shows severely decreased LV dysfunction with EF 10-15%  2. Transaminitis- improved.   3. Hyponatremia - improved.   4. Acute hypoxic respiratory failure: resolved.   5. HFrEF  - switch to oral Lasix 40 mg daily   - trial of losartan 12.5 mg daily if patient tolerates.   - awaiting home hospice.   -------------------------------------------------------------------------------------------  Billing Statement:   36 minutes spent on total encounter. Necessity of time spent during this encounter on this date of service was due to review of medical information in EMR, co-ordination of hospital and outpatient care, discussion with patient and communication with primary team.   -------------------------------------------------------------------------------------------  Kenny Flanagan MD   of Cardiology  [facility]  [address], [address]  [place] [id number]  Phone: [phone]  Fax: [phone]    Please check [email or web address]: [email or web address] for cardiology service schedule and contact information.

## 2024-11-23 NOTE — PROGRESS NOTE ADULT - SUBJECTIVE AND OBJECTIVE BOX
[facility]  Patient is a 89y old  Male who presents with a chief complaint of AHRF 2/2 Acute CHF (23 Nov 2024 13:01)      SUBJECTIVE / OVERNIGHT EVENTS:  Patient reports being more comfortably and breathing comfortably compared to yesterday. Denies any issues at this time. Denies any fevers/chills, headache, CP, abd pain, N/V/D, constipation, or leg swelling.       MEDICATIONS  (STANDING):  enoxaparin Injectable 30 milliGRAM(s) SubCutaneous every 24 hours  fluticasone propionate/ salmeterol 100-50 MICROgram(s) Diskus 1 Dose(s) Inhalation two times a day  furosemide   Injectable 40 milliGRAM(s) IV Push daily  melatonin 5 milliGRAM(s) Oral at bedtime  pantoprazole   Suspension 40 milliGRAM(s) Oral daily  polyethylene glycol 3350 17 Gram(s) Oral daily    MEDICATIONS  (PRN):  acetaminophen     Tablet .. 650 milliGRAM(s) Oral every 6 hours PRN Temp greater or equal to 38C (100.4F)  albuterol/ipratropium for Nebulization 3 milliLiter(s) Nebulizer every 6 hours PRN Shortness of Breath and/or Wheezing        OBJECTIVE:  Vital Signs Last 24 Hrs  T(C): 36.6 (23 Nov 2024 11:31), Max: 37.1 (22 Nov 2024 15:56)  T(F): 97.9 (23 Nov 2024 11:31), Max: 98.8 (22 Nov 2024 15:56)  HR: 93 (23 Nov 2024 11:31) (83 - 94)  BP: 102/64 (23 Nov 2024 11:31) (99/56 - 105/59)  BP(mean): 75 (23 Nov 2024 11:31) (75 - 77)  RR: 18 (23 Nov 2024 11:31) (18 - 20)  SpO2: 100% (23 Nov 2024 11:31) (84% - 100%)    Parameters below as of 23 Nov 2024 11:31  Patient On (Oxygen Delivery Method): nasal cannula  O2 Flow (L/min): 2    PHYSICAL EXAM:  GENERAL: NAD,   HEAD:  Atraumatic, Normocephalic  EYES: conjunctiva and sclera clear  NECK: Supple, No JVD  CHEST/LUNG: Clear to auscultation bilaterally; No wheeze  HEART: Regular rate and rhythm; No murmurs, rubs, or gallops  ABDOMEN: Soft, Nontender, Nondistended; Bowel sounds present  EXTREMITIES:  No clubbing, cyanosis, or edema  PSYCH: AAOx3  NEUROLOGY: non-focal  SKIN: No rashes or lesions    CAPILLARY BLOOD GLUCOSE      POCT Blood Glucose.: 159 mg/dL (22 Nov 2024 21:40)  POCT Blood Glucose.: 125 mg/dL (22 Nov 2024 16:36)    I&O's Summary    22 Nov 2024 07:01  -  23 Nov 2024 07:00  --------------------------------------------------------  IN: 0 mL / OUT: 775 mL / NET: -775 mL    23 Nov 2024 07:01  -  23 Nov 2024 13:37  --------------------------------------------------------  IN: 318 mL / OUT: 0 mL / NET: 318 mL              LABS:                        10.2   11.85 )-----------( 133      ( 23 Nov 2024 09:50 )             30.5     11-23    138  |  101  |  25[H]  ----------------------------<  196[H]  3.2[L]   |  34[H]  |  0.64    Ca    8.8      23 Nov 2024 09:50  Phos  2.0     11-23  Mg     2.1     11-23    TPro  5.8[L]  /  Alb  2.5[L]  /  TBili  2.6[H]  /  DBili  x   /  AST  36  /  ALT  87[H]  /  AlkPhos  111  11-23          Urinalysis Basic - ( 23 Nov 2024 09:50 )    Color: x / Appearance: x / SG: x / pH: x  Gluc: 196 mg/dL / Ketone: x  / Bili: x / Urobili: x   Blood: x / Protein: x / Nitrite: x   Leuk Esterase: x / RBC: x / WBC x   Sq Epi: x / Non Sq Epi: x / Bacteria: x          Culture - Sputum (collected 20 Nov 2024 16:00)  Source: ET Tube ET Tube  Gram Stain (20 Nov 2024 23:21):    Numerous polymorphonuclear leukocytes per low power field    Moderate Squamous epithelial cells per low power field    Moderate Gram Negative Rods per oil power field    Moderate Gram Positive Cocci in Clusters per oil power field    Few Yeast like cells per oil power field    Results consistent with oropharyngeal contamination  Preliminary Report (21 Nov 2024 15:28):    Commensal real consistent with body site  Organism: Stenotrophomonas maltophilia (23 Nov 2024 10:42)  Organism: Stenotrophomonas maltophilia (23 Nov 2024 10:42)        RADIOLOGY & ADDITIONAL TESTS:

## 2024-11-23 NOTE — DISCHARGE NOTE PROVIDER - NSDCCPCAREPLAN_GEN_ALL_CORE_FT
PRINCIPAL DISCHARGE DIAGNOSIS  Diagnosis: Acute respiratory failure with hypoxia  Assessment and Plan of Treatment: You were treated for respiratory failure likely triggerred by your heart failure. Acute respiratory failure is a condition that happens when your lungs cannot get enough oxygen into your blood. Call your local emergency number (911 in the [place]) or have someone call if: You have more trouble catching your breath. You have stopped breathing.  Manage ARF: Do not smoke. Nicotine and other chemicals in cigarettes and cigars can cause lung damage and make your symptoms worse. Prevent the spread of germs. Wash your hands often with soap and water. Cover your mouth when you cough. Cough into a tissue or your shirtsleeve so you do not spread germs from your hands. If you are sick, stay away from others as much as possible. Ask about vaccines you may need. Vaccines can help prevent some lung infections. Examples include pneumonia, COVID-19. Get a flu vaccine every year as soon as it is recommended, usually in September or October.  Follow up with your doctor        SECONDARY DISCHARGE DIAGNOSES  Diagnosis: Acute systolic congestive heart failure  Assessment and Plan of Treatment: You were treated inthe ICU for an acute exacerbation of your heart failure. Heart failure is a condition in which the heart is no longer able to pump oxygen-rich blood to the rest of the body efficiently. When symptoms become severe, a hospital stay may be necessary. Your health care team closely adjusted the fluids you drank or received through an intravenous (IV) line. They also watched and measured how much urine you produced. You may have received medicines to help your body get rid of extra fluids  Weigh yourself daily.  If you gain 3lbs in 3 days, or 5lbs in a week call your Health Care Provider.  Do not eat or drink foods containing more than 2000mg of salt (sodium) in your diet every day.  Call your Health Care Provider if you have any swelling or increased swelling in your feet, ankles, and/or stomach.  Take all of your medication as directed.  If you become dizzy call your Health Care Provider.      Diagnosis: Chronic obstructive pulmonary disease  Assessment and Plan of Treatment: Call your Health Care provider upon arrival home to make a follow up appointment within one week.  Take all inhalers as prescribed by your Health Care Provider.  Take steroids as prescribed by your Health Care Provider.  If your cough increases infrequency and severity and/or you have shortness of breath or increased shortness of breath call your Health Care Provider.  If you develop fever, chills, night sweats, malaise, and/or change in mental status call your Health care Provider.  Nutrition is very important.  Eat small frequent meals.  Increase your activity as tolerated.  Do not stay in bed all day

## 2024-11-24 NOTE — PROGRESS NOTE ADULT - SUBJECTIVE AND OBJECTIVE BOX
[facility] Medicine  Patient is a 89y old  Male who presents with a chief complaint of AHRF 2/2 Acute CHF (23 Nov 2024 14:11)      SUBJECTIVE / OVERNIGHT EVENTS:  No acute events over night. Denies any fevers/chills, headache, CP, SOB, abd pain, N/V/D, constipation, or leg swelling.       MEDICATIONS  (STANDING):  enoxaparin Injectable 30 milliGRAM(s) SubCutaneous every 24 hours  fluticasone propionate/ salmeterol 100-50 MICROgram(s) Diskus 1 Dose(s) Inhalation two times a day  furosemide    Tablet 40 milliGRAM(s) Oral daily  melatonin 5 milliGRAM(s) Oral at bedtime  pantoprazole   Suspension 40 milliGRAM(s) Oral daily  polyethylene glycol 3350 17 Gram(s) Oral daily    MEDICATIONS  (PRN):  acetaminophen     Tablet .. 650 milliGRAM(s) Oral every 6 hours PRN Temp greater or equal to 38C (100.4F)  albuterol/ipratropium for Nebulization 3 milliLiter(s) Nebulizer every 6 hours PRN Shortness of Breath and/or Wheezing        OBJECTIVE:  Vital Signs Last 24 Hrs  T(C): 36.8 (24 Nov 2024 10:53), Max: 37 (24 Nov 2024 05:22)  T(F): 98.2 (24 Nov 2024 10:53), Max: 98.6 (24 Nov 2024 05:22)  HR: 92 (24 Nov 2024 10:53) (92 - 97)  BP: 92/61 (24 Nov 2024 10:53) (92/61 - 109/71)  BP(mean): 72 (24 Nov 2024 10:53) (72 - 72)  RR: 18 (24 Nov 2024 10:53) (18 - 18)  SpO2: 94% (24 Nov 2024 10:53) (94% - 100%)    Parameters below as of 24 Nov 2024 10:53  Patient On (Oxygen Delivery Method): nasal cannula  O2 Flow (L/min): 2    PHYSICAL EXAM:  GENERAL: NAD,   HEAD:  Atraumatic, Normocephalic  EYES: conjunctiva and sclera clear  NECK: Supple, No JVD  CHEST/LUNG: Clear to auscultation bilaterally; No wheeze  HEART: Regular rate and rhythm; No murmurs, rubs, or gallops  ABDOMEN: Soft, Nontender, Nondistended; Bowel sounds present  EXTREMITIES:  No clubbing, cyanosis, or edema  PSYCH: AAOx3  NEUROLOGY: non-focal  SKIN: No rashes or lesions    CAPILLARY BLOOD GLUCOSE        I&O's Summary    23 Nov 2024 07:01  -  24 Nov 2024 07:00  --------------------------------------------------------  IN: 536 mL / OUT: 600 mL / NET: -64 mL    24 Nov 2024 07:01  -  24 Nov 2024 16:10  --------------------------------------------------------  IN: 450 mL / OUT: 100 mL / NET: 350 mL              LABS:                        10.1   9.63  )-----------( 122      ( 24 Nov 2024 06:15 )             31.5     11-24    140  |  101  |  27[H]  ----------------------------<  130[H]  3.5   |  36[H]  |  0.62    Ca    9.2      24 Nov 2024 06:15  Phos  2.8     11-24  Mg     2.4     11-24    TPro  5.8[L]  /  Alb  2.5[L]  /  TBili  2.6[H]  /  DBili  x   /  AST  36  /  ALT  87[H]  /  AlkPhos  111  11-23          Urinalysis Basic - ( 24 Nov 2024 06:15 )    Color: x / Appearance: x / SG: x / pH: x  Gluc: 130 mg/dL / Ketone: x  / Bili: x / Urobili: x   Blood: x / Protein: x / Nitrite: x   Leuk Esterase: x / RBC: x / WBC x   Sq Epi: x / Non Sq Epi: x / Bacteria: x            RADIOLOGY & ADDITIONAL TESTS:

## 2024-11-24 NOTE — CHART NOTE - NSCHARTNOTEFT_GEN_A_CORE
EVENT:  notified by Rn critical sputum culture result positive for a few Stenotrophomonas maltophilia, rare pseudomonas aeruginosa      HPI:  Patient is a 89 y/o M with PMH COPD, and prostate cancer (in remission s/p radiation) who presented with 2 day history of worsening shortness of breath, and confusion. Patient was intubated at time of evaluation and collateral history was provided by Buffy Salinas (Daughter - on call). As per daughter patient had been complaining of worsening shortness of breath and difficulty breathing for 2 days. She denies any associated cough, phlegm, or fevers. She reports patient complained of worsening shortness of breath while lying down, and she did notice worsening LE swelling in the past few weeks. She reports patient was diagnosed with left sided pneumonia 3 weeks ago and completed course of Augmentin outpatient after which his symptoms resolved. As per the daughter patient has no history of cardiac disease, and has no history of stent placement, or MI. She reports patient is non-compliant with symbicort and albuterol inhaler, but is not on any other medication. She denies any other symptoms that patient complained about recently.   ED course: Patient was given ceftriaxone, azithromycin, and given 3L IV fluid. Paitent was also placed on BiPAP due to increased work of breathing. However, patient was not able to tolerate BiPAP and ripped it off. Patient was saturating 73% on room air at which time patient was intubated.  (18 Nov 2024 23:57)        OBJECTIVE:  Vital Signs Last 24 Hrs  T(C): 36.8 (24 Nov 2024 16:11), Max: 37 (24 Nov 2024 05:22)  T(F): 98.2 (24 Nov 2024 16:11), Max: 98.6 (24 Nov 2024 05:22)  HR: 95 (24 Nov 2024 16:11) (92 - 97)  BP: 95/62 (24 Nov 2024 16:11) (92/61 - 109/71)  BP(mean): 72 (24 Nov 2024 10:53) (72 - 72)  RR: 18 (24 Nov 2024 16:11) (18 - 18)  SpO2: 93% (24 Nov 2024 16:11) (93% - 100%)    Parameters below as of 24 Nov 2024 16:11  Patient On (Oxygen Delivery Method): nasal cannula  O2 Flow (L/min): 2      LABS:                        10.1   9.63  )-----------( 122      ( 24 Nov 2024 06:15 )             31.5     11-24    140  |  101  |  27[H]  ----------------------------<  130[H]  3.5   |  36[H]  |  0.62    Ca    9.2      24 Nov 2024 06:15  Phos  2.8     11-24  Mg     2.4     11-24    TPro  5.8[L]  /  Alb  2.5[L]  /  TBili  2.6[H]  /  DBili  x   /  AST  36  /  ALT  87[H]  /  AlkPhos  111  11-23        PLAN:   1. discussed sputum cx result with Dr. Ansari  2. consulted Id Dr. Ibanez  3. STAT Levaquin 750mg IV q 24hrs x 7 days    FOLLOW UP/RESULTS:    1. f/u ID reccs

## 2024-11-24 NOTE — PROGRESS NOTE ADULT - ASSESSMENT
89M  with h/o COPD, prostate cancer s/p RT was said to have presented to the ED with worsening SOB associated with leg swelling and in the ED he was placed on bipap and thereafter intubated and placed on mechanical ventilation. Extubated and downgraded to floor. Palliative assessed and plan for home hospice.    #Acute respiratory failure from likely decompensated heart failure s/p intubation  #Acute decompensated heart failure   #Bilateral pleural effusions from heart failure  #Lactic acidosis likely Type A  #Hyponatremia  #GB stones with wall thickening likely edema  #LL nodule  #H/o COPD  #H/o prostate cancer s/p RT    Extubated 11/20  Switch to oral lasix 40mg daily  No obvious signs of infection, will observe off antibiotics  Unable to initiate GDMT given low BP   Dysphagia screening passed - advance diet - aspiration precaution  DVT/GI prophylaxis  - pending home hospice instatement

## 2024-11-25 NOTE — CONSULT NOTE ADULT - CONSULT REASON
cardiogenic shock
pneumonia
LE wounds
Consult to: Discuss complex medical decision making related to goals of care

## 2024-11-25 NOTE — PROGRESS NOTE ADULT - PROBLEM SELECTOR PLAN 2
New onset acute systolic HF  shortness of breath, hypoxia, LE swelling. Family reports no known history of HF, shares 4 week history of LE edema   Pro-BNP  10,828  Trop  PEAKED AT 60.6.   EKG - NSR w/ left bundle branch block and PVCs.   Bedside POCUS : VTI 12,  held and VTI repeated after 1 hour and remains unchanged  continue lasix to 40mg IVP QD   TTE  Left ventricular systolic function is severely decreased with a calculated ejection fraction of 18 % by the Mejias's biplane method of disks. There is global left ventricular hypokinesis. There is moderate (grade 2) left ventricular diastolic dysfunction.  - Cardiology Dr. Flanagan recs to continue medical management.

## 2024-11-25 NOTE — PROGRESS NOTE ADULT - ATTENDING COMMENTS
89M  with h/o COPD, prostate cancer s/p RT was said to have presented to the ED with worsening SOB associated with leg swelling and in the ED he was placed on bipap and thereafter intubated and placed on mechanical ventilation. Extubated and downgraded to floor. Palliative assessed and plan for home hospice.    #Acute respiratory failure from likely decompensated heart failure s/p intubation  #Acute decompensated heart failure   #Bilateral pleural effusions from heart failure  #Lactic acidosis likely Type A  #Hyponatremia  #GB stones with wall thickening likely edema  #LL nodule  #H/o COPD  #H/o prostate cancer s/p RT    Extubated 11/20  Switched to oral lasix 40mg daily  No obvious signs of infection, will observe off antibiotics  Unable to initiate GDMT given low BP   Dysphagia screening passed - advance diet - aspiration precaution  DVT/GI prophylaxis  - Also found to have CRE pseudomonas and Stenotrophomonas maltophilia in Sputum culture    - ID Dr. De Souza, plan discussed - ceftolozane/tazobactam IVPB 1500 every 8 hours + Levaquin 750 mg q24 hrs (7 days of tx)      - No PO alternative for ceftolozane - Will have to discuss with daughter regarding if would like to continue IV abx or DC, no answer today  - pending home hospice instatement

## 2024-11-25 NOTE — PROGRESS NOTE ADULT - ASSESSMENT
Patient is a 89 y/o M with PMH COPD, and prostate cancer (in remission s/p radiation) who presented with 2 day history of worsening shortness of breath, and confusion. As per daughter patient had been complaining of worsening shortness of breath and difficulty breathing for 2 days associated with LE swelling, and orthopnea. In the ED patient was given ceftriaxone, azithromycin, and given 3L IV fluid. Bedside POCUS revealed b/l B-lines, significant dilated cardiomyopathy and poor contractility.  Admitted for AHRF likely 2/2 to acute systolic HF.   Extubated on 11/20 to nasal cannula and currently on room air   GOC discussion held with patient and family by Palliative Dr. Smith, patient now DNR/DNI with plan for home hospice on discharge.   Sputum grew a rare form of pseudomonas, on contact isolation. ID Dr. De Souza consulted, recs pending

## 2024-11-25 NOTE — PROGRESS NOTE ADULT - SUBJECTIVE AND OBJECTIVE BOX
Patient is a 89y old  Male who presents with a chief complaint of AHRF 2/2 Acute CHF (24 Nov 2024 16:09)      OVERNIGHT EVENTS: none noted     REVIEW OF SYSTEMS:  CONSTITUTIONAL: No fever, chills  All ROS negative     T(C): 36.7 (11-25-24 @ 11:07), Max: 36.8 (11-24-24 @ 16:11)  HR: 100 (11-25-24 @ 11:07) (71 - 100)  BP: 113/73 (11-25-24 @ 11:07) (86/67 - 113/73)  RR: 18 (11-25-24 @ 11:07) (18 - 18)  SpO2: 94% (11-25-24 @ 11:07) (90% - 95%)  Wt(kg): --Vital Signs Last 24 Hrs  T(C): 36.7 (25 Nov 2024 11:07), Max: 36.8 (24 Nov 2024 16:11)  T(F): 98.1 (25 Nov 2024 11:07), Max: 98.2 (24 Nov 2024 16:11)  HR: 100 (25 Nov 2024 11:07) (71 - 100)  BP: 113/73 (25 Nov 2024 11:07) (86/67 - 113/73)  BP(mean): --  RR: 18 (25 Nov 2024 11:07) (18 - 18)  SpO2: 94% (25 Nov 2024 11:07) (90% - 95%)    Parameters below as of 25 Nov 2024 11:07  Patient On (Oxygen Delivery Method): room air          PHYSICAL EXAM:  GENERAL: NAD  CHEST/LUNG: Clear to auscultation bilaterally; No rales, rhonchi, wheezing, or rubs  HEART: S1, S2, Regular rate and rhythm  ABDOMEN: Soft, Nontender, Nondistended; Bowel sounds present  NEURO: Alert & Oriented X3  EXTREMITIES: No LE edema, no calf tenderness  LYMPH: No lymphadenopathy noted  SKIN: No rashes or lesions    Consultant(s) Notes Reviewed:  [x ] YES  [ ] NO  Care Discussed with Consultants/Other Providers [ x] YES  [ ] NO  LABS:                        10.1   9.63  )-----------( 122      ( 24 Nov 2024 06:15 )             31.5     11-24    140  |  101  |  27[H]  ----------------------------<  130[H]  3.5   |  36[H]  |  0.62    Ca    9.2      24 Nov 2024 06:15  Phos  2.8     11-24  Mg     2.4     11-24        CAPILLARY BLOOD GLUCOSE          Urinalysis Basic - ( 24 Nov 2024 06:15 )    Color: x / Appearance: x / SG: x / pH: x  Gluc: 130 mg/dL / Ketone: x  / Bili: x / Urobili: x   Blood: x / Protein: x / Nitrite: x   Leuk Esterase: x / RBC: x / WBC x   Sq Epi: x / Non Sq Epi: x / Bacteria: x        RADIOLOGY & ADDITIONAL TESTS:  Imaging Personally Reviewed:  [ ] YES  [ ] NO

## 2024-11-25 NOTE — PROGRESS NOTE ADULT - PROBLEM SELECTOR PLAN 1
likely due to new onset acute HF vs COPD exacerbation  P/w shortness of breath, hypoxia, LE swelling.   CTA chest: No PE, Cardiomegaly and Bilateral pleural effusions, left greater than right. Suggestive of cardiogenic failure

## 2024-11-25 NOTE — PHARMACOTHERAPY INTERVENTION NOTE - COMMENTS
Weight 42 kG
Recommended changing propofol RASS goal to a range
If covering from ET tube isolates, suggest to dose with ceftolozane/tazobactam 1.5 g IVPB every 8 hours over 3 hours (CrCl < 50 mL/min)
Via enteral tube

## 2024-11-25 NOTE — CONSULT NOTE ADULT - ASSESSMENT
HPI:  Patient is a 89 y/o M with PMH COPD, and prostate cancer (in remission s/p radiation) who presented with 2 day history of worsening shortness of breath, and confusion PTA. Patient was intubated on admission for AHRF and admitted to MICU for management of heart failure/cardiogenic shock. As per daughter patient had been complaining of worsening shortness of breath and difficulty breathing for 2 days before presentation. She denies any associated cough, phlegm, or fevers. She reports patient complained of worsening shortness of breath while lying down, and she did notice worsening LE swelling in the past few weeks. She reports patient was diagnosed with left sided pneumonia 3 weeks ago and completed course of Augmentin outpatient after which his symptoms resolved. As per the daughter patient has no history of cardiac disease, and has no history of stent placement, or MI. She reports patient is non-compliant with symbicort and albuterol inhaler, but is not on any other medication. She denies any other symptoms that patient complained about recently.   ED course: Patient was given ceftriaxone, azithromycin, and given 3L IV fluid. Paitent was also placed on BiPAP due to increased work of breathing. However, patient was not able to tolerate BiPAP and ripped it off. Patient was saturating 73% on room air at which time patient was intubated.  (18 Nov 2024 23:57)    Allergies: No Known Allergies  Intolerances    PAST MEDICAL & SURGICAL HISTORY:  Prostate cancer  Chronic obstructive pulmonary disease    SOCIAL HISTORY: No smoking ;no alcohol abuse, no IVDU  FAMILY HISTORY: no pertinent related medical history    REVIEW OF SYSTEMS: Poor historian, confused    PHYSICAL EXAM:  VITALS: Vital Signs Last 24 Hrs  T(C): 36.7 (25 Nov 2024 11:07), Max: 36.8 (24 Nov 2024 16:11)  T(F): 98.1 (25 Nov 2024 11:07), Max: 98.2 (24 Nov 2024 16:11)  HR: 100 (25 Nov 2024 11:07) (71 - 100)  BP: 113/73 (25 Nov 2024 11:07) (86/67 - 113/73)  BP(mean): --  RR: 18 (25 Nov 2024 11:07) (18 - 18)  SpO2: 94% (25 Nov 2024 11:07) (90% - 95%)    Parameters below as of 25 Nov 2024 11:07  Patient On (Oxygen Delivery Method): room air    Gen: AOx1, afebrile, NAD, cachectic , chronically ill appearing   HEAD:  Atraumatic, Normocephalic  EYES: PERRLA, conjunctiva and sclera clear  ENT: Moist mucous membranes  NECK: Supple, No JVD  CV: S1+S2 normal, no murmurs   Resp: crackles BL  Abd: Soft, nontender, +BS  Ext: No LE edema, no cyanosis, LE pulses present  : no dysuria   IV/Skin: No thrombophlebitis, no skin rash  Msk: No low back pain, no arthralgias, no joint swelling  Neuro: AAOx1. No focal signs, confused    LABS/DIAGNOSTIC TESTS:                        10.1   9.63  )-----------( 122      ( 24 Nov 2024 06:15 )             31.5     WBC Count: 9.63 K/uL (11-24 @ 06:15)  WBC Count: 11.85 K/uL (11-23 @ 09:50)    11-24    140  |  101  |  27[H]  ----------------------------<  130[H]  3.5   |  36[H]  |  0.62    Ca    9.2      24 Nov 2024 06:15  Phos  2.8     11-24  Mg     2.4     11-24    MRSA PCR Result.: NotDetec (11-19-24 @ 00:17)  Mycoplasma IgM AB: 0.17 Index (11-19-24 @ 00:17)  Streptococcus pneumoniae Ag, Ur Result: Negative (11-19-24 @ 00:38)  Legionella Antigen, Urine: Negative (11.19.24 @ 00:38)    CULTURES:   Culture - Sputum (collected 11-20-24 @ 16:00)  Source: ET Tube ET Tube  Gram Stain (11-20-24 @ 23:21):    Numerous polymorphonuclear leukocytes per low power field    Moderate Squamous epithelial cells per low power field    Moderate Gram Negative Rods per oil power field    Moderate Gram Positive Cocci in Clusters per oil power field    Few Yeast like cells per oil power field    Results consistent with oropharyngeal contamination  Final Report (11-24-24 @ 14:32):    Few Stenotrophomonas maltophilia    Rare Pseudomonas aeruginosa (Carbapenem Resistant)    Commensal real consistent with body site  Organism: Stenotrophomonas maltophilia  Pseudomonas aeruginosa (Carbapenem Resistant) (11-24-24 @ 14:32)  Organism: Pseudomonas aeruginosa (Carbapenem Resistant) (11-24-24 @ 14:32)      Method Type: CarbaR      -  Resistance Gene to Carbapenem: Nondet  Organism: Pseudomonas aeruginosa (Carbapenem Resistant) (11-24-24 @ 14:32)      Method Type: MITESH      -  Aztreonam: I 16      -  Cefepime: I 16      -  Ceftazidime: I 16      -  Ceftazidime/Avibactam: S <=4      -  Ceftolozane/tazobactam: S <=2      -  Ciprofloxacin: I 1      -  Imipenem: R >8      -  Levofloxacin: S 1      -  Meropenem: R 8      -  Piperacillin/Tazobactam: R >64  Organism: Stenotrophomonas maltophilia (11-24-24 @ 14:32)      Method Type: MITESH      -  Levofloxacin: S <=0.5      -  Trimethoprim/Sulfamethoxazole: S <=0.5/9.5    Culture - Blood (collected 11-18-24 @ 17:10)  Source: .Blood BLOOD  Final Report (11-23-24 @ 23:06):    No growth at 5 days    Culture - Blood (collected 11-18-24 @ 17:00)  Source: .Blood BLOOD  Final Report (11-23-24 @ 23:06):    No growth at 5 days    RADIOLOGY: All pertinent available imaging reviewed  < from: CT Abdomen and Pelvis w/ IV Cont (11.18.24 @ 21:15) >  FINDINGS:  CHEST:  LUNGS AND LARGE AIRWAYS: Patent central airways. Scarring versus   atelectasis is noted throughout both lungs. Thick-walled small cysts   versus bronchiectasis of the left lower lobe is appreciated. There is a 9   mm left lower lobe pulmonary nodule. Bibasilar atelectasis is noted.   Focal bronchiectasis and bronchial wall thickening of the right lower   lobe is appreciated.  PLEURA: There is a small to moderate left pleural effusion and a small   right pleural effusion. There is no pneumothorax.  VESSELS: Atherosclerotic calcifications of the aorta and coronary   arteries are appreciated. The aorta and pulmonary artery are of normal   caliber. Evaluation for pulmonary embolus is limited due to suboptimal   contrast bolus timing. There is mixing of contrast seen within the   segmental and subsegmental pulmonary arteries of the right lung. There   are no filling defects throughout the remainder of the opacified   pulmonary arterial tree.  HEART: Heart size is enlarged valvular calcifications are appreciated..   No pericardial effusion. There is reflux of contrastinto the hepatic   veins and hepatic IVC. This may indicate right-sided heart dysfunction.  MEDIASTINUM AND ZEUS: No lymphadenopathy.  CHEST WALL AND LOWER NECK: Left clavicular region demonstrates partially   visualized intramuscular lipoma or fluidcollection. Evaluation this   region is limited by streak artifact. There is partially visualized   lipomatous lesion anterior to the right scapula.    ABDOMEN AND PELVIS:  LIVER: Diffusely heterogeneous liver. There is diffuse periportal edema..  BILE DUCTS: Normal caliber.  GALLBLADDER: Cholelithiasis. There is gallbladder wall thickening versus   pericholecystic fluid.  SPLEEN: Within normal limits.  PANCREAS: Within normal limits.  ADRENALS: Within normal limits.  KIDNEYS/URETERS: Bilateral subcentimeter hypodensities are too small to   characterize. There is no urinary tract obstruction.    BLADDER: Thick-walled.  REPRODUCTIVE ORGANS: Prostate gland is enlarged    BOWEL: Limited evaluation due to generalized edema and lack of oral   contrast. The cecum is flipped medially and appears mildly distended. The   appendix is not distinctly visualized. There is diffuse wall thickening   of the stomach and small bowel.  PERITONEUM/RETROPERITONEUM: Within normal limits.  VESSELS: Atherosclerotic changes.  LYMPH NODES: No lymphadenopathy.  ABDOMINAL WALL: Generalized anasarca. Subcutaneous lipoma of the left   anterior abdominal wall.  BONES: Degenerative changes. Scoliotic deformity.    IMPRESSION:  Limited evaluation for pulmonary embolus within the right segmental and   subsegmental pulmonary arteries due to mixing of contrast. There is no   pulmonary embolus seen within the remaining opacified pulmonary arterial   tree.    Cardiomegaly and Bilateral pleural effusions, left greater than right.   Suggestive of cardiogenic failure.    Reflux of contrast into the hepatic veins and intrahepatic IVC may   suggest right-sided heart dysfunction. Correlate clinically.     Heterogeneous hepatic enhancement which may be related to congestion or   underlying hepatic dysfunction. Correlate clinically.    Apparent wall thickening of the urinary bladder which may be related   sequelae of chronic outlet obstruction from prostate enlargement or   cystitis. Correlate clinically.    Question diffuse wall thickening of the stomach and small bowel.   Correlate clinically for diffuse gastroenteritis. This may be of   infectious or inflammatory etiology, the diffuse congestion or ischemic   bowel disease may also be considered. Correlate clinically.    Cholelithiasis. Gallbladder wall thickening versus pericholecystic fluid   is nonspecific in the setting of generalized edema.    Multiple soft tissue lipomas noted. Question of lipomatous lesion versus   fluid collection in the left supraclavicular region. Artifact limits   evaluation is region.    Focal bronchiectasis versus thick-walled cyst of the left lower lobe and   focal bronchiectasis with associated wall thickening of the right lower   lobe. This may be infectious or inflammatory in nature. Follow-up to   assess resolution.    9 mm left lower lobe pulmonary nodule. Single nodule larger than 8mm   should be followed by CT, PET/CT or tissue sampling at 3 months. --   ?Reference: Guidelines for Management of Incidental Pulmonary Nodules   Detected on CT Images: From the Fleischner Society 2017?    ANTIBIOTICS:  ceftolozane/tazobactam IVPB 1500 every 8 hours  levoFLOXacin IVPB    levoFLOXacin IVPB 750 every 24 hours    IMPRESSION:   89 y/o M with PMH COPD, and prostate cancer (in remission s/p radiation) who presented with 2 day history of worsening shortness of breath, and confusion PTA. Patient was intubated on admission for AHRF and admitted to MICU for management of heart failure/cardiogenic shock. Family reports recent tx for PNA 3 weeks ago with Augmentin for pneumonia. She was treated for cardiogenic shock and given ctx + azithro for pneumonia. Relevant labs with mild leukocytosis,Pro-Brain Natriuretic Peptide: 96962, Lactate elevation, mild Lfts elevation(shock), Blood cx NGTD but trach aspirate 11/20 + Stenotrophomonas maltophilia ; Pseudomonas aeruginosa (Carbapenem Resistant).  CTA chest 11/18 w/Focal bronchiectasis versus thick-walled cyst of the left lower lobe and focal bronchiectasis with associated wall thickening of the right lower lobe, pleural effusion, no PE.   MRSA PCR Result.: NotDetec (11-19-24 @ 00:17)  Mycoplasma IgM AB: 0.17 Index (11-19-24 @ 00:17)  Streptococcus pneumoniae Ag, Ur Result: Negative (11-19-24 @ 00:38)  Legionella AG in urine neg    -AHRF 2/2 decompensated CHF requiring intubation extubated 11/20)  -Cardiogenic shock (EF 18%)  -Pneumonia     PLAN:  Please start ceftolozane/tazobactam IVPB 1500 every 8 hours + Levaquin 750 mg q24 hrs (7 days of tx)  F/up cx  Get CT chest  F/up cardiology  Aspiration precautions  F/up Palliative currently discussion GOC  Discussed with Dr. Ansari    Please reach ID with any questions or concerns  Dr. Sondra Goetz  Available in Teams

## 2024-11-26 NOTE — PROGRESS NOTE ADULT - ASSESSMENT
89 M from home with SOB. Hx of COPD, prostate CA s/p radiation. Treated for sepsis in ED, intubated for resp distress after failing bipap. EF 18%. Extubated and downgraded to floor. Now on 7d abx with plan to go home on hospice.

## 2024-11-26 NOTE — PROGRESS NOTE ADULT - NS ATTEND AMEND GEN_ALL_CORE FT
Patient seen and examined at bedside this morning. He is awake and looking around but is not responding to questions nor in Ma[place].    Am vitals, labs reviewed. No leukocytosis, Hgb stable.  PE as above.     A/P   89M  with h/o COPD, prostate cancer s/p RT was said to have presented to the ED with worsening SOB associated with leg swelling and in the ED he was placed on bipap and thereafter intubated and placed on mechanical ventilation. Extubated and downgraded to floor. Found to have CRE pseudomonas and Stenotrophomonas maltophilia in Sputum culture. Palliative assessed and plan for home hospice after completion of Abx course.     #Acute hypoxic respiratory failure s/p extubation   #Acute decompensated heart failure    #Multifocal pneumonia  - growing Pseudomonas / Stenotrophomonas   #Bilateral pleural effusions from heart failure   #Lactic acidosis likely Type A   #Hyponatremia   #GB stones with wall thickening likely edema   #LL nodule   #H/o COPD   #H/o prostate cancer s/p RT     -Extubated 11/20, now on 2L NC   -c/w oral lasix 40mg daily   -No obvious signs of infection, will observe off antibiotics   -Unable to initiate GDMT given low BP    -Dysphagia screening passed - advance diet - aspiration precaution   -DVT/GI prophylaxis   - Also found to have CRE pseudomonas and Stenotrophomonas maltophilia in Sputum culture     - ID Dr. De Souza, plan discussed - ceftolozane/tazobactam IVPB 1500 every 8 hours + Levaquin 750 mg q24 hrs (7 days of tx total)       - No PO alternative for ceftolozane - Discussed with daughter over phone, regarding if would like to continue IV abx or DC with hospice, per discussion she would prefer her father to complete the abx prior to returning home   -pending home hospice instatement after completion of abx, informed [facility] care SW this morning

## 2024-11-26 NOTE — PROGRESS NOTE ADULT - SUBJECTIVE AND OBJECTIVE BOX
follow up on:  complex medical decision making related to goals of care    [facility] Palliative Consult Service:  Mariana Williamson DO: cell ([phone])  Rex Jones MD: cell ([phone])  Tom Delgado NP: cell ([phone])   Lei Mckeon LMSW: cell ([phone])   Jessica Fleischer-Black, MD: cell: ([phone])    Can contact any Palliative Team member via Microsoft Teams for consults and questions    OVERNIGHT EVENTS:    Present Symptoms:     Review of Systems: Unable to obtain due to poor mentation  Present Symptoms:   Pain:             Location -                               Aggravating factors -             Quality -             Radiation -             Timing-             Severity (0-10 scale):             Minimal acceptable level (0-10 scale):  Fatigue:  Nausea:  Lack of Appetite:   SOB:  Depression:  Anxiety:  Constipation:     CPOT:    https://ccs-st.org/resources/Documents/Sedation%20Analgesia%20Delirium%20in%20CC/CCS%20STH%20Guideline%20template%20CPOT.pdf  PAIN AD Score:   http://geriatrictoolkit.[email or web address]/painad.pdf (press ctrl +  left click to view)      MEDICATIONS  (STANDING):  ceftolozane/tazobactam IVPB 1500 milliGRAM(s) IV Intermittent every 8 hours  chlorhexidine 2% Cloths 1 Application(s) Topical <User Schedule>  enoxaparin Injectable 30 milliGRAM(s) SubCutaneous every 24 hours  fluticasone propionate/ salmeterol 100-50 MICROgram(s) Diskus 1 Dose(s) Inhalation two times a day  furosemide    Tablet 40 milliGRAM(s) Oral daily  levoFLOXacin IVPB      levoFLOXacin IVPB 750 milliGRAM(s) IV Intermittent every 24 hours  melatonin 5 milliGRAM(s) Oral at bedtime  pantoprazole   Suspension 40 milliGRAM(s) Oral daily  polyethylene glycol 3350 17 Gram(s) Oral daily    MEDICATIONS  (PRN):  acetaminophen     Tablet .. 650 milliGRAM(s) Oral every 6 hours PRN Temp greater or equal to 38C (100.4F)  albuterol/ipratropium for Nebulization 3 milliLiter(s) Nebulizer every 6 hours PRN Shortness of Breath and/or Wheezing      PHYSICAL EXAM:  Vital Signs Last 24 Hrs  T(C): 36.6 (26 Nov 2024 05:27), Max: 36.8 (25 Nov 2024 16:13)  T(F): 97.8 (26 Nov 2024 05:27), Max: 98.2 (25 Nov 2024 16:13)  HR: 96 (26 Nov 2024 05:27) (96 - 108)  BP: 121/75 (26 Nov 2024 05:27) (106/67 - 132/83)  BP(mean): 90 (26 Nov 2024 05:27) (85 - 90)  RR: 17 (26 Nov 2024 05:27) (17 - 18)  SpO2: 100% (26 Nov 2024 05:27) (93% - 100%)    Parameters below as of 25 Nov 2024 20:00  Patient On (Oxygen Delivery Method): room air        General: alert  oriented x  1. Not speaking to me today. This seems volitional.    Palliative Performance Scale/Karnofsky Score:  http://npcrc.org/files/news/palliative_performance_scale_ppsv2.pdf    HEENT: no abnormal lesion  Lungs: CTA b/l  CV: RRR, S1S2  GI: soft non distended non tender  incontinent                 last BM: 11/26/24  : incontinent    Musculoskeletal: weakness x4 fully bedbound/wheelchair bound  Skin:  poor skin turgor, pressure ulcers stage: unstagable on sacrum  Neuro:  mild cognitive impairment dsyphagia  Oral intake ability:  moderate capability    LABS:                          11.2   9.66  )-----------( 132      ( 26 Nov 2024 07:05 )             34.0     11-26    142  |  102  |  40[H]  ----------------------------<  151[H]  3.8   |  30  |  0.88    Ca    9.7      26 Nov 2024 07:05    TPro  6.1  /  Alb  2.6[L]  /  TBili  2.4[H]  /  DBili  x   /  AST  25  /  ALT  66[H]  /  AlkPhos  122[H]  11-26    Urinalysis Basic - ( 26 Nov 2024 07:05 )    Color: x / Appearance: x / SG: x / pH: x  Gluc: 151 mg/dL / Ketone: x  / Bili: x / Urobili: x   Blood: x / Protein: x / Nitrite: x   Leuk Esterase: x / RBC: x / WBC x   Sq Epi: x / Non Sq Epi: x / Bacteria: x        RADIOLOGY & ADDITIONAL STUDIES: Reviewed

## 2024-11-26 NOTE — PROGRESS NOTE ADULT - ASSESSMENT
Subjective: bed bound, confused, afebrile, no N/V or diarrhea     REVIEW OF SYSTEMS: Unable to obtain pt confused    PE:  Vital Signs Last 24 Hrs  T(C): 36.6 (26 Nov 2024 14:06), Max: 36.8 (25 Nov 2024 16:13)  T(F): 97.9 (26 Nov 2024 14:06), Max: 98.2 (25 Nov 2024 16:13)  HR: 110 (26 Nov 2024 14:06) (96 - 110)  BP: 123/83 (26 Nov 2024 14:06) (106/67 - 132/83)  BP(mean): 90 (26 Nov 2024 05:27) (85 - 90)  RR: 20 (26 Nov 2024 14:06) (17 - 20)  SpO2: 100% (26 Nov 2024 14:06) (93% - 100%)    Parameters below as of 26 Nov 2024 14:06  Patient On (Oxygen Delivery Method): nasal cannula  O2 Flow (L/min): 2    Gen: AOx0-1, cachectic, chronically ill appearing    HEAD:  Atraumatic  EYES: PERRLA, conjunctiva and sclera clear  ENT: Moist mucous membranes  NECK: Supple, No JVD  CV: S1+S2 normal, no murmurs  Resp: crackles BL  Abd: Soft, nontender, +BS  Ext: No LE edema, no cyanosis, pulses +  : No dysuria, incontinent  IV/Skin: No thrombophlebitis  Msk: no joint swelling  Neuro: AAOx0-1 no focal signs     LABS/DIAGNOSTIC TESTS:                        11.2   9.66  )-----------( 132      ( 26 Nov 2024 07:05 )             34.0     WBC Count: 9.66 K/uL (11-26 @ 07:05)  WBC Count: 9.63 K/uL (11-24 @ 06:15)    11-26    142  |  102  |  40[H]  ----------------------------<  151[H]  3.8   |  30  |  0.88    Ca    9.7      26 Nov 2024 07:05    TPro  6.1  /  Alb  2.6[L]  /  TBili  2.4[H]  /  DBili  x   /  AST  25  /  ALT  66[H]  /  AlkPhos  122[H]  11-26    MRSA PCR Result.: NotDetec (11-19-24 @ 00:17)  Mycoplasma IgM AB: 0.17 Index (11-19-24 @ 00:17)  Streptococcus pneumoniae Ag, Ur Result: Negative (11-19-24 @ 00:38)    CULTURES:   Culture - Sputum (collected 11-20-24 @ 16:00)  Source: ET Tube ET Tube  Gram Stain (11-20-24 @ 23:21):    Numerous polymorphonuclear leukocytes per low power field    Moderate Squamous epithelial cells per low power field    Moderate Gram Negative Rods per oil power field    Moderate Gram Positive Cocci in Clusters per oil power field    Few Yeast like cells per oil power field    Results consistent with oropharyngeal contamination  Final Report (11-24-24 @ 14:32):    Few Stenotrophomonas maltophilia    Rare Pseudomonas aeruginosa (Carbapenem Resistant)    Commensal real consistent with body site  Organism: Stenotrophomonas maltophilia  Pseudomonas aeruginosa (Carbapenem Resistant) (11-24-24 @ 14:32)  Organism: Pseudomonas aeruginosa (Carbapenem Resistant) (11-24-24 @ 14:32)      Method Type: CarbaR      -  Resistance Gene to Carbapenem: Nondet  Organism: Pseudomonas aeruginosa (Carbapenem Resistant) (11-24-24 @ 14:32)      Method Type: MITESH      -  Aztreonam: I 16      -  Cefepime: I 16      -  Ceftazidime: I 16      -  Ceftazidime/Avibactam: S <=4      -  Ceftolozane/tazobactam: S <=2      -  Ciprofloxacin: I 1      -  Imipenem: R >8      -  Levofloxacin: S 1      -  Meropenem: R 8      -  Piperacillin/Tazobactam: R >64  Organism: Stenotrophomonas maltophilia (11-24-24 @ 14:32)      Method Type: MITESH      -  Levofloxacin: S <=0.5      -  Trimethoprim/Sulfamethoxazole: S <=0.5/9.5    Culture - Blood (collected 11-18-24 @ 17:10)  Source: .Blood BLOOD  Final Report (11-23-24 @ 23:06):    No growth at 5 days    Culture - Blood (collected 11-18-24 @ 17:00)  Source: .Blood BLOOD  Final Report (11-23-24 @ 23:06):    No growth at 5 days    RADIOLOGY:   Available pertinent Imaging reviewed  < from: CT Chest No Cont (11.25.24 @ 13:50) >  FINDINGS:    AIRWAYS/LUNGS/PLEURA: Patent central airways. There is bronchiectasis,   most prominent in the lower lobes. There are peribronchial wall   thickening and peribronchial vascular groundglass opacities most   prominent in right middle and bilateral lower lobes. There are left   greater than right small pleural effusions.    MEDIASTINUM AND ZEUS: No lymphadenopathy.    VESSELS: Aortic calcifications. Coronary artery calcifications.    HEART: Cardiomegaly. Mitral annular calcification. Intervertebral septum   is hyperattenuating relative to the blood pool, suggestive of anemia. No   pericardial effusion.    VISUALIZED UPPER ABDOMEN: Within normal limits.    CHEST WALL AND BONES: Right subscapular lipoma measuring 1.8 x 3.5 x 1.6   cm. (3-18). Left infrascapular lipoma (3-29). Patient is cachectic.   Diffuse subcutaneous edema. Degenerative changes of the spine.    IMPRESSION:  Scattered multifocal groundglass and nodular opacities, likely infectious.  Small bilateral pleural effusions, unchanged.    ANTIBIOTICS:  ceftolozane/tazobactam IVPB 1500 every 8 hours  levoFLOXacin IVPB    levoFLOXacin IVPB 750 every 24 hours    IMPRESSION:   89 y/o M with PMH COPD, and prostate cancer (in remission s/p radiation) who presented with 2 day history of worsening shortness of breath, and confusion PTA. Patient was intubated on admission for AHRF and admitted to MICU for management of heart failure/cardiogenic shock. Family reports recent tx for PNA 3 weeks ago with Augmentin for pneumonia. She was treated for cardiogenic shock and given ctx + azithro for pneumonia. Relevant labs with mild leukocytosis,Pro-Brain Natriuretic Peptide: 81821, Lactate elevation, mild Lfts elevation(shock), Blood cx NGTD but trach aspirate 11/20 + Stenotrophomonas maltophilia ; Pseudomonas aeruginosa (Carbapenem Resistant).  CTA chest 11/18 w/Focal bronchiectasis versus thick-walled cyst of the left lower lobe and focal bronchiectasis with associated wall thickening of the right lower lobe, pleural effusion, no PE.   MRSA PCR Result.: NotDetec (11-19-24 @ 00:17)  Mycoplasma IgM AB: 0.17 Index (11-19-24 @ 00:17)  Streptococcus pneumoniae Ag, Ur Result: Negative (11-19-24 @ 00:38)  Legionella AG in urine neg  CT chest 11/25 with multifocal groundglass and nodular opacities, likely infectious.    -AHRF 2/2 decompensated CHF requiring intubation- extubated 11/20)  -Cardiogenic shock (EF 18%)  -Multifocal Pneumonia- Pseudomonas / Stenotrophomonas      PLAN:  c/w ceftolozane/tazobactam IVPB 1500 every 8 hours + Levaquin 750 mg q24 hrs (7 days of tx)  F/up cx  F/up cardiology  Aspiration precautions  Discussed with medicine    Please reach ID with any questions or concerns  Dr. Sondra Goetz  Available in Teams

## 2024-11-26 NOTE — PROGRESS NOTE ADULT - SUBJECTIVE AND OBJECTIVE BOX
NP Note discussed with  primary attending    Patient is a 89y old  Male who presents with a chief complaint of AHRF 2/2 Acute CHF (26 Nov 2024 14:17)      INTERVAL HPI/OVERNIGHT EVENTS: no new complaints    MEDICATIONS  (STANDING):  ceftolozane/tazobactam IVPB 1500 milliGRAM(s) IV Intermittent every 8 hours  chlorhexidine 2% Cloths 1 Application(s) Topical <User Schedule>  enoxaparin Injectable 30 milliGRAM(s) SubCutaneous every 24 hours  fluticasone propionate/ salmeterol 100-50 MICROgram(s) Diskus 1 Dose(s) Inhalation two times a day  furosemide    Tablet 40 milliGRAM(s) Oral daily  levoFLOXacin IVPB      levoFLOXacin IVPB 750 milliGRAM(s) IV Intermittent every 24 hours  melatonin 5 milliGRAM(s) Oral at bedtime  pantoprazole   Suspension 40 milliGRAM(s) Oral daily  polyethylene glycol 3350 17 Gram(s) Oral daily    MEDICATIONS  (PRN):  acetaminophen     Tablet .. 650 milliGRAM(s) Oral every 6 hours PRN Temp greater or equal to 38C (100.4F)  albuterol/ipratropium for Nebulization 3 milliLiter(s) Nebulizer every 6 hours PRN Shortness of Breath and/or Wheezing      __________________________________________________  REVIEW OF SYSTEMS: Unable to obtain ROS due to impaired cognition    CONSTITUTIONAL: No fever,       Vital Signs Last 24 Hrs  T(C): 36.6 (26 Nov 2024 14:06), Max: 36.8 (25 Nov 2024 16:13)  T(F): 97.9 (26 Nov 2024 14:06), Max: 98.2 (25 Nov 2024 16:13)  HR: 110 (26 Nov 2024 14:06) (96 - 110)  BP: 123/83 (26 Nov 2024 14:06) (106/67 - 132/83)  BP(mean): 90 (26 Nov 2024 05:27) (85 - 90)  RR: 20 (26 Nov 2024 14:06) (17 - 20)  SpO2: 100% (26 Nov 2024 14:06) (93% - 100%)    Parameters below as of 26 Nov 2024 14:06  Patient On (Oxygen Delivery Method): nasal cannula  O2 Flow (L/min): 2      ________________________________________________  PHYSICAL EXAM:  GENERAL: NAD  HEENT: Normocephalic;  conjunctivae and sclerae clear; moist mucous membranes;   NECK : supple  CHEST/LUNG: Clear to auscultation bilaterally with good air entry   HEART: S1 S2  regular; no murmurs, gallops or rubs  ABDOMEN: Soft, Nontender, Nondistended; Bowel sounds present  EXTREMITIES: no cyanosis; no edema; no calf tenderness  SKIN: warm and dry; no rash  NERVOUS SYSTEM:  A&Ox1-2    _________________________________________________  LABS:                        11.2   9.66  )-----------( 132      ( 26 Nov 2024 07:05 )             34.0     11-26    142  |  102  |  40[H]  ----------------------------<  151[H]  3.8   |  30  |  0.88    Ca    9.7      26 Nov 2024 07:05    TPro  6.1  /  Alb  2.6[L]  /  TBili  2.4[H]  /  DBili  x   /  AST  25  /  ALT  66[H]  /  AlkPhos  122[H]  11-26      Urinalysis Basic - ( 26 Nov 2024 07:05 )    Color: x / Appearance: x / SG: x / pH: x  Gluc: 151 mg/dL / Ketone: x  / Bili: x / Urobili: x   Blood: x / Protein: x / Nitrite: x   Leuk Esterase: x / RBC: x / WBC x   Sq Epi: x / Non Sq Epi: x / Bacteria: x      CAPILLARY BLOOD GLUCOSE            RADIOLOGY & ADDITIONAL TESTS:  < from: CT Chest No Cont (11.25.24 @ 13:50) >    IMPRESSION:    Scattered multifocal groundglass and nodular opacities, likely infectious.    Small bilateral pleural effusions, unchanged.    --- End of Report ---      < end of copied text >    Imaging Personally Reviewed:  YES    Consultant(s) Notes Reviewed:   YES    Care Discussed with Consultants :     Plan of care was discussed with patient and /or primary care giver; all questions and concerns were addressed and care was aligned with patient's wishes.

## 2024-11-26 NOTE — PROGRESS NOTE ADULT - ASSESSMENT
Patient is a 89 y/o M with PMH COPD, and prostate cancer (in remission s/p radiation) who presented with 2 day history of worsening shortness of breath, and confusion. As per daughter patient had been complaining of worsening shortness of breath and difficulty breathing for 2 days associated with LE swelling, and orthopnea. In the ED patient was given ceftriaxone, azithromycin, and given 3L IV fluid. Bedside POCUS revealed b/l B-lines, significant dilated cardiomyopathy and poor contractility.  Admitted for AHRF likely 2/2 to acute systolic HF.   Extubated on 11/20 to nasal cannula and currently on room air   GOC discussion held with patient and family by Palliative Dr. Smith, patient now DNR/DNI with plan for home hospice on discharge.   Sputum grew a rare form of pseudomonas, on contact isolation. ID Dr. De Souza consulted, recs pending

## 2024-11-26 NOTE — PROGRESS NOTE ADULT - PROBLEM SELECTOR PLAN 1
P/w shortness of breath, hypoxia, LE swelling.   likely due to new onset acute HF vs COPD exacerbation vs PNA  CT chest 11/25 with multifocal groundglass and nodular opacities, likely infectious.  CTA chest: No PE, Cardiomegaly and Bilateral pleural effusions, left greater than right. Suggestive of cardiogenic failure  Sputum cx grew rare form of psuedomonas / Stenotrophomonas   ID recs: c/w ceftolozane/tazobactam IVPB 1500 every 8 hours + Levaquin 750 mg q24 hrs (7 days of tx)  Aspiration precautions  ID Dr. De Souza

## 2024-11-27 NOTE — PROGRESS NOTE ADULT - AGENT'S NAME
Called the PT department at [facility] and they said the nurse usually puts in a request for PT but did not. He sees the order there, they will run benefits and be there today or tomorrow. I notified the patient and told him if PT is not there by 10 am tomorrow to call me back and I will call the PT department again. He will call back with any other questions/concerns  
Buffy Salinas
Buffy Salinas

## 2024-11-27 NOTE — CHART NOTE - NSCHARTNOTEFT_GEN_A_CORE
EVENT: As per RN, pt was found on the floor. No body witnessed pt falling.        ASSESSMENT: Upon assessment, pt is alert but confused. Unable to articulate his thought about why he was found on the floor.. No bruises noted anywhere. No signs of pain or discomfort.      PLAN:   - CT head urgent shows no acute hemorrhage   - Will continue to monitor      FOLLOW UP/RESULTS:            HPI:  Patient is a 89 y/o M with PMH COPD, and prostate cancer (in remission s/p radiation) who presented with 2 day history of worsening shortness of breath, and confusion. Patient was intubated at time of evaluation and collateral history was provided by Buffy Salinas (Daughter - on call). As per daughter patient had been complaining of worsening shortness of breath and difficulty breathing for 2 days. She denies any associated cough, phlegm, or fevers. She reports patient complained of worsening shortness of breath while lying down, and she did notice worsening LE swelling in the past few weeks. She reports patient was diagnosed with left sided pneumonia 3 weeks ago and completed course of Augmentin outpatient after which his symptoms resolved. As per the daughter patient has no history of cardiac disease, and has no history of stent placement, or MI. She reports patient is non-compliant with symbicort and albuterol inhaler, but is not on any other medication. She denies any other symptoms that patient complained about recently.   ED course: Patient was given ceftriaxone, azithromycin, and given 3L IV fluid. Paitent was also placed on BiPAP due to increased work of breathing. However, patient was not able to tolerate BiPAP and ripped it off. Patient was saturating 73% on room air at which time patient was intubated.  (18 Nov 2024 23:57)        OBJECTIVE:  Vital Signs Last 24 Hrs  T(C): 36.4 (27 Nov 2024 07:59), Max: 36.8 (26 Nov 2024 20:05)  T(F): 97.6 (27 Nov 2024 07:59), Max: 98.3 (26 Nov 2024 20:05)  HR: 84 (27 Nov 2024 07:59) (84 - 110)  BP: 116/72 (27 Nov 2024 07:59) (108/68 - 123/83)  BP(mean): 86 (26 Nov 2024 20:05) (86 - 86)  RR: 16 (27 Nov 2024 07:59) (16 - 20)  SpO2: 94% (27 Nov 2024 07:59) (94% - 100%)    Parameters below as of 27 Nov 2024 07:59  Patient On (Oxygen Delivery Method): room air        LABS:                        10.3   8.27  )-----------( 128      ( 27 Nov 2024 06:40 )             30.9     11-27    144  |  105  |  43[H]  ----------------------------<  178[H]  3.1[L]   |  33[H]  |  0.88    Ca    9.3      27 Nov 2024 06:40    TPro  5.6[L]  /  Alb  2.4[L]  /  TBili  2.0[H]  /  DBili  x   /  AST  27  /  ALT  53  /  AlkPhos  103  11-27

## 2024-11-27 NOTE — PROGRESS NOTE ADULT - NS ATTEND AMEND GEN_ALL_CORE FT
Patient seen and examined at bedside this morning. He is awake, sitting up and speaking to his wife at bedside and to daughter over the phone. Bulgarian translation provided by daughter Buffy over phone per patient preference. The patient asks when he will be able to get out of bed to walk. He and family are informed to ask for help from staff if they wish to help him walk as he fell out of bed earlier in the morning. He also complains of his dressign around the L arm being too tight and asks for it to be adjusted.   Am vitals, labs reviewed. No leukocytosis, Hgb stable.  PE - elderly male, sitting up in bed, RRR, mild scattered rhonchi, trace crackles, abd soft ntnd, no LE edema    A/P   89M with pmh of COPD, prostate cancer s/p RT was said to have presented to the ED with worsening SOB associated with leg swelling and in the ED he was placed on bipap and thereafter intubated and placed on mechanical ventilation. Extubated and downgraded to floor. Found to have CRE pseudomonas and Stenotrophomonas maltophilia in Sputum culture. Palliative assessed and plan for home hospice after completion of Abx course.     #Acute hypoxic respiratory failure s/p extubation   #Acute decompensated heart failure    #Multifocal pneumonia  - growing Pseudomonas / Stenotrophomonas   #Bilateral pleural effusions from heart failure   #Lactic acidosis likely Type A   #Hyponatremia   #GB stones with wall thickening likely edema   #LL nodule   #H/o COPD   #H/o prostate cancer s/p RT   #Hypokalemia    -Extubated 11/20, now on 2L NC   -c/w oral lasix 40mg daily   -Unable to initiate GDMT given low BP    -Dysphagia screening passed - advance diet - aspiration precaution   -DVT/GI prophylaxis   - Also found to have CRE pseudomonas and Stenotrophomonas maltophilia in Sputum culture     - ID Dr. De Souza, plan discussed - ceftolozane/tazobactam IVPB 1500 every 8 hours + Levaquin 750 mg q24 hrs (7 days of tx total until Dec 1)       - No PO alternative for ceftolozane - Discussed with daughter over phone, regarding if would like to continue IV abx or DC with hospice, per discussion she would prefer her father to complete the abx prior to returning home   -replete K  -pending home hospice instatement after completion of abx, [facility] SW aware of this Patient seen and examined at bedside this morning. He is awake, sitting up and speaking to his wife at bedside and to daughter over the phone. Macedonian translation provided by daughter Buffy over phone per patient preference. The patient asks when he will be able to get out of bed to walk. He and family are informed to ask for help from staff if they wish to help him walk as he fell out of bed earlier in the morning. He also complains of his dressign around the L arm being too tight and asks for it to be adjusted.   Am vitals, labs reviewed. No leukocytosis, Hgb stable. CTH reviewed - no acute bleed seen. PE - elderly male, sitting up in bed, RRR, mild scattered rhonchi, trace crackles, abd soft ntnd, no LE edema    A/P   89M with pmh of COPD, prostate cancer s/p RT was said to have presented to the ED with worsening SOB associated with leg swelling and in the ED he was placed on bipap and thereafter intubated and placed on mechanical ventilation. Extubated and downgraded to floor. Found to have CRE pseudomonas and Stenotrophomonas maltophilia in Sputum culture. Palliative assessed and plan for home hospice after completion of Abx course.     #Acute hypoxic respiratory failure s/p extubation   #Acute decompensated heart failure    #Multifocal pneumonia  - growing Pseudomonas / Stenotrophomonas   #Bilateral pleural effusions from heart failure   #Lactic acidosis likely Type A   #Hyponatremia   #GB stones with wall thickening likely edema   #LL nodule   #H/o COPD   #H/o prostate cancer s/p RT   #Hypokalemia    -Extubated 11/20, now on 2L NC   -c/w oral lasix 40mg daily   -Unable to initiate GDMT given low BP    -Dysphagia screening passed - advance diet - aspiration precaution   -DVT/GI prophylaxis   - Also found to have CRE pseudomonas and Stenotrophomonas maltophilia in Sputum culture     - ID Dr. De Souza, plan discussed - ceftolozane/tazobactam IVPB 1500 every 8 hours + Levaquin 750 mg q24 hrs (7 days of tx total until Dec 1)       - No PO alternative for ceftolozane - Discussed with daughter over phone, regarding if would like to continue IV abx or DC with hospice, per discussion she would prefer her father to complete the abx prior to returning home   -fall precautions, oobtc with assistance  -replete K  -pending home hospice instatement after completion of abx, [facility] aware of this

## 2024-11-27 NOTE — PROGRESS NOTE ADULT - CONVERSATION DETAILS
Spoke face to face with Buffy Salinas in English. Patti Tam listening (Luxembourger speaking). Buffy is HCP. Will provide form.   Patti had asked that I speak with Buffy. In absence of HCP, Patti would be surrogate decision-maker by [facility] Care Decision Act.   Chuy is unable to take part in C discussion because he is sedated on a ventilator.     Buffy told me about her dad, a vital man who would kick his leg in a karate chop if anyone called him old. He loves to listen to music and dance to old school [employer] music. He still has 78rpm records at home that he listens to. He is very proud and would not like to be in the dependent situation he is in now.     Buffy only recently learned that her dad had been told 20 y ago that there was an issue with his heart. Reently he had a PNA and was treated for it and has had b/l LE edema for 2 mons. His doctor had recommended that he see a cardiologist, but he refused.    She has been told by the team that his heart function is severely reduced with an EF of 18%. We discussed that this would make him eligible for home hospice. She though that this is a good goal for him, if we can get him medically stable to transfer to home.     We discussed code status and she agreed that CPR would be a burden for him given his age and frailty and likelihood of injury during the process. We filled out a MOLST to make him DNR. We deferred decisions on re-intubation until we know more about his possibility of extubation in ICU.     All questions answered and support given.
Spoke with Nidia face to face today and re-did ROLANDO to make Bernadino DNR/DNI/No feeding tube/No HD. Trial of ivf and trial of abx ok.     ROLANDO placed in chart and a copy given to Buffy.    discussed that the plan is for patient to go home on home hospice after finishing 1 week iv abx here at [facility]    Buffy shared that 02 was delivered to house today by hospice agency.

## 2024-11-27 NOTE — PROGRESS NOTE ADULT - ASSESSMENT
Patient is a 89 y/o M with PMH COPD, and prostate cancer (in remission s/p radiation) who presented with 2 day history of worsening shortness of breath, and confusion. As per daughter patient had been complaining of worsening shortness of breath and difficulty breathing for 2 days associated with LE swelling, and orthopnea. In the ED patient was given ceftriaxone, azithromycin, and given 3L IV fluid. Bedside POCUS revealed b/l B-lines, significant dilated cardiomyopathy and poor contractility.  Admitted for AHRF likely 2/2 to acute systolic HF.   Extubated on 11/20 to nasal cannula and currently on room air   GOC discussion held with patient and family by Palliative Dr. Smith, patient now DNR/DNI with plan for home hospice on discharge.   Sputum grew a rare form of pseudomonas, on contact isolation. ID Dr. De Souza consulted. ID recs to c/w ceftolozane/tazobactam IVPB 1500 every 8 hours + Levaquin 750 mg q24 hrs (7 days of tx)

## 2024-11-27 NOTE — PROGRESS NOTE ADULT - SUBJECTIVE AND OBJECTIVE BOX
follow up on:  complex medical decision making related to goals of care    [facility] Geriatric and Palliative Consult Service:  Mariana Williamson DO: cell ([phone])  Rex Jones MD: cell ([phone])  Tom Delgado NP: cell ([phone])   Lei Mckeon LMSW: cell ([phone])   Jessica Fleischer-Black, MD: cell: ([phone])    Can contact any Palliative Team member via Microsoft Teams for consults and questions    OVERNIGHT EVENTS:None    Present Symptoms: denies    Review of Systems: All others negative   Present Symptoms: Mild,   Pain:             Location -          denies                     Aggravating factors -             Quality -             Radiation -             Timing-             Severity (0-10 scale):             Minimal acceptable level (0-10 scale):  Fatigue:  Nausea:  Lack of Appetite:   SOB:  Depression:  Anxiety:  Constipation:     CPOT:    https://ccs-st.org/resources/Documents/Sedation%20Analgesia%20Delirium%20in%20CC/CCS%20STH%20Guideline%20template%20CPOT.pdf  PAIN AD Score:   [email or web address]/cog/painad.pdf (press ctrl +  left click to view)MEDICATIONS  (STANDING):  ceftolozane/tazobactam IVPB 1500 milliGRAM(s) IV Intermittent every 8 hours  chlorhexidine 2% Cloths 1 Application(s) Topical <User Schedule>  enoxaparin Injectable 30 milliGRAM(s) SubCutaneous every 24 hours  fluticasone propionate/ salmeterol 100-50 MICROgram(s) Diskus 1 Dose(s) Inhalation two times a day  furosemide    Tablet 40 milliGRAM(s) Oral daily  levoFLOXacin IVPB      levoFLOXacin IVPB 750 milliGRAM(s) IV Intermittent every 24 hours  melatonin 5 milliGRAM(s) Oral at bedtime  pantoprazole   Suspension 40 milliGRAM(s) Oral daily  polyethylene glycol 3350 17 Gram(s) Oral daily  potassium chloride   Powder 40 milliEquivalent(s) Oral every 4 hours    MEDICATIONS  (PRN):  acetaminophen     Tablet .. 650 milliGRAM(s) Oral every 6 hours PRN Temp greater or equal to 38C (100.4F)  albuterol/ipratropium for Nebulization 3 milliLiter(s) Nebulizer every 6 hours PRN Shortness of Breath and/or Wheezing      PHYSICAL EXAM:  Vital Signs Last 24 Hrs  T(C): 36.8 (27 Nov 2024 14:00), Max: 36.8 (26 Nov 2024 20:05)  T(F): 98.2 (27 Nov 2024 14:00), Max: 98.3 (26 Nov 2024 20:05)  HR: 87 (27 Nov 2024 14:00) (84 - 93)  BP: 111/66 (27 Nov 2024 14:00) (108/68 - 116/72)  BP(mean): 86 (26 Nov 2024 20:05) (86 - 86)  RR: 17 (27 Nov 2024 14:00) (16 - 18)  SpO2: 95% (27 Nov 2024 14:00) (94% - 98%)    Parameters below as of 27 Nov 2024 14:00  Patient On (Oxygen Delivery Method): room air        General: alert  oriented x 2     cachexia  verbal Much perkier today. Happier and speaking to me    Palliative Performance Scale/Karnofsky Score: 20%  http://npcrc.org/files/news/palliative_performance_scale_ppsv2.pdf    HEENT: no abnormal lesion  Lungs: CTA b/l  CV: RRR, S1S2  GI: soft non distended non tender  incontinent                 last BM: 11/26/24  : incontinent    Musculoskeletal: weakness x4 fully bedbound/wheelchair bound  Skin:  poor skin turgor, pressure ulcers stage: unstagable on sacrum  Neuro:  mild cognitive impairment dsyphagia  Oral intake ability:  moderate capability    LABS:                          10.3   8.27  )-----------( 128      ( 27 Nov 2024 06:40 )             30.9     11-27    144  |  105  |  43[H]  ----------------------------<  178[H]  3.1[L]   |  33[H]  |  0.88    Ca    9.3      27 Nov 2024 06:40    TPro  5.6[L]  /  Alb  2.4[L]  /  TBili  2.0[H]  /  DBili  x   /  AST  27  /  ALT  53  /  AlkPhos  103  11-27    Urinalysis Basic - ( 27 Nov 2024 06:40 )    Color: x / Appearance: x / SG: x / pH: x  Gluc: 178 mg/dL / Ketone: x  / Bili: x / Urobili: x   Blood: x / Protein: x / Nitrite: x   Leuk Esterase: x / RBC: x / WBC x   Sq Epi: x / Non Sq Epi: x / Bacteria: x        RADIOLOGY & ADDITIONAL STUDIES:

## 2024-11-27 NOTE — PROGRESS NOTE ADULT - SUBJECTIVE AND OBJECTIVE BOX
NP Note discussed with  primary attending    Patient is a 89y old  Male who presents with a chief complaint of AHRF 2/2 Acute CHF (26 Nov 2024 15:15)      INTERVAL HPI/OVERNIGHT EVENTS: no new complaints    MEDICATIONS  (STANDING):  ceftolozane/tazobactam IVPB 1500 milliGRAM(s) IV Intermittent every 8 hours  chlorhexidine 2% Cloths 1 Application(s) Topical <User Schedule>  enoxaparin Injectable 30 milliGRAM(s) SubCutaneous every 24 hours  fluticasone propionate/ salmeterol 100-50 MICROgram(s) Diskus 1 Dose(s) Inhalation two times a day  furosemide    Tablet 40 milliGRAM(s) Oral daily  levoFLOXacin IVPB      levoFLOXacin IVPB 750 milliGRAM(s) IV Intermittent every 24 hours  melatonin 5 milliGRAM(s) Oral at bedtime  pantoprazole   Suspension 40 milliGRAM(s) Oral daily  polyethylene glycol 3350 17 Gram(s) Oral daily    MEDICATIONS  (PRN):  acetaminophen     Tablet .. 650 milliGRAM(s) Oral every 6 hours PRN Temp greater or equal to 38C (100.4F)  albuterol/ipratropium for Nebulization 3 milliLiter(s) Nebulizer every 6 hours PRN Shortness of Breath and/or Wheezing      __________________________________________________  REVIEW OF SYSTEMS: Unable to elicit ROS due to impaired cognition    CONSTITUTIONAL: No fever,   Vital Signs Last 24 Hrs  T(C): 36.4 (27 Nov 2024 07:59), Max: 36.8 (26 Nov 2024 20:05)  T(F): 97.6 (27 Nov 2024 07:59), Max: 98.3 (26 Nov 2024 20:05)  HR: 84 (27 Nov 2024 07:59) (84 - 110)  BP: 116/72 (27 Nov 2024 07:59) (108/68 - 123/83)  BP(mean): 86 (26 Nov 2024 20:05) (86 - 86)  RR: 16 (27 Nov 2024 07:59) (16 - 20)  SpO2: 94% (27 Nov 2024 07:59) (94% - 100%)    Parameters below as of 27 Nov 2024 07:59  Patient On (Oxygen Delivery Method): room air        ________________________________________________  PHYSICAL EXAM:  GENERAL: NAD  HEENT: Normocephalic;  conjunctivae and sclerae clear; moist mucous membranes;   NECK : supple  CHEST/LUNG: Clear to ausculitation bilaterally with good air entry   HEART: S1 S2  regular; no murmurs, gallops or rubs  ABDOMEN: Soft, Nontender, Nondistended; Bowel sounds present  EXTREMITIES: no cyanosis; no edema; no calf tenderness  SKIN: warm and dry; no rash  NERVOUS SYSTEM:  A&O x1    _________________________________________________  LABS:                        10.3   8.27  )-----------( 128      ( 27 Nov 2024 06:40 )             30.9     11-27    144  |  105  |  43[H]  ----------------------------<  178[H]  3.1[L]   |  33[H]  |  0.88    Ca    9.3      27 Nov 2024 06:40    TPro  5.6[L]  /  Alb  2.4[L]  /  TBili  2.0[H]  /  DBili  x   /  AST  27  /  ALT  53  /  AlkPhos  103  11-27      Urinalysis Basic - ( 27 Nov 2024 06:40 )    Color: x / Appearance: x / SG: x / pH: x  Gluc: 178 mg/dL / Ketone: x  / Bili: x / Urobili: x   Blood: x / Protein: x / Nitrite: x   Leuk Esterase: x / RBC: x / WBC x   Sq Epi: x / Non Sq Epi: x / Bacteria: x      CAPILLARY BLOOD GLUCOSE            RADIOLOGY & ADDITIONAL TESTS:  < from: CT Head No Cont (11.27.24 @ 09:00) >  IMPRESSION:  Mild chronic microvascular changes without evidence of an   acute transcortical infarction or hemorrhage.    < end of copied text >    Imaging Personally Reviewed:  YES    Consultant(s) Notes Reviewed:   YES    Care Discussed with Consultants :     Plan of care was discussed with patient and /or primary care giver; all questions and concerns were addressed and care was aligned with patient's wishes.

## 2024-11-28 NOTE — PROGRESS NOTE ADULT - SUBJECTIVE AND OBJECTIVE BOX
Vital Signs Last 24 Hrs  T(C): 36.2 (28 Nov 2024 05:05), Max: 36.9 (27 Nov 2024 20:00)  T(F): 97.2 (28 Nov 2024 05:05), Max: 98.4 (27 Nov 2024 20:00)  HR: 64 (28 Nov 2024 05:05) (64 - 95)  BP: 136/67 (28 Nov 2024 05:05) (111/66 - 136/67)  BP(mean): --  RR: 16 (28 Nov 2024 05:05) (16 - 17)  SpO2: 95% (28 Nov 2024 05:05) (95% - 97%)    Parameters below as of 28 Nov 2024 05:05  Patient On (Oxygen Delivery Method): room air                            11.4   8.88  )-----------( 138      ( 28 Nov 2024 05:36 )             36.6     11-28    146[H]  |  106  |  37[H]  ----------------------------<  136[H]  2.7[LL]   |  31  |  0.84    Ca    9.5      28 Nov 2024 05:36  Mg     2.6     11-28    TPro  6.1  /  Alb  2.7[L]  /  TBili  2.1[H]  /  DBili  x   /  AST  22  /  ALT  51  /  AlkPhos  115  11-28

## 2024-11-28 NOTE — PROGRESS NOTE ADULT - ASSESSMENT
ID [id number]  Patient seen and examined at bedside this morning. He is awake, lying comfortably in bed. He has trouble with the  and  is only able to make out a few words. He answers yes/no questions better, denies any trouble breathing or any chest pain or stomachache.  Am vitals, labs reviewed. No leukocytosis, Hgb stable. K 2.7. CTH reviewed - no acute bleed seen. PE - thin, cachetic, elderly male, lying in bed, RRR, mild scattered rhonchi, trace crackles, abd soft ntnd, no LE edema    A/P   89M with pmh of COPD, prostate cancer s/p RT was said to have presented to the ED with worsening SOB associated with leg swelling and in the ED he was placed on bipap and thereafter intubated and placed on mechanical ventilation. Extubated and downgraded to floor. Found to have CRE pseudomonas and Stenotrophomonas maltophilia in Sputum culture. Palliative assessed and plan for home hospice after completion of Abx course.     #Acute hypoxic respiratory failure s/p extubation   #Acute decompensated heart failure    #Multifocal pneumonia  - growing Pseudomonas / Stenotrophomonas   #Bilateral pleural effusions from heart failure   #Lactic acidosis likely Type A   #Hyponatremia   #GB stones with wall thickening likely edema   #LL nodule   #H/o COPD   #H/o prostate cancer s/p RT   #Hypokalemia  #Severe protein-calorie malnutrition    -Extubated 11/20, now on 2L NC   -c/w oral lasix 40mg daily   -Unable to initiate GDMT given low BP    -Dysphagia screening passed - advance diet - aspiration precaution   -DVT/GI prophylaxis   - Also found to have CRE pseudomonas and Stenotrophomonas maltophilia in Sputum culture     - ID Dr. De Souza, plan discussed - ceftolozane/tazobactam IVPB 1500 every 8 hours + Levaquin 750 mg q24 hrs (7 days of tx total until Dec 1)       - No PO alternative for ceftolozane - Discussed with daughter over phone, regarding if would like to continue IV abx or DC with hospice, per discussion she would prefer her father to complete the abx prior to returning home   -fall precautions, oobtc with assistance  -replete K  -pending home hospice instatement after completion of abx, \[facility]\"" care SW aware of this

## 2024-11-29 NOTE — PROGRESS NOTE ADULT - PROBLEM SELECTOR PROBLEM 6
Discharge planning issues
Transaminitis
Discharge planning issues
Transaminitis
Transaminitis
Discharge planning issues

## 2024-11-29 NOTE — CHART NOTE - NSCHARTNOTEFT_GEN_A_CORE
Plan for home hospice on d/c after course of IV antibiotics.   Hospice referral in and DME delivered.   TANIYA Mckeon aware.  Palliative care will sign off. Please reconsult if needed.

## 2024-11-29 NOTE — PROGRESS NOTE ADULT - PROBLEM SELECTOR PROBLEM 2
HF (heart failure)
Acute hypoxic respiratory failure
Acute hypoxic respiratory failure
HF (heart failure)
Pneumonia
HF (heart failure)
Pneumonia
HF (heart failure)
HF (heart failure)
Pneumonia
None

## 2024-11-29 NOTE — PROGRESS NOTE ADULT - PROBLEM SELECTOR PLAN 7
Family wants abx completed in/pt & then proceed with Home hospice   SW following
Family wants abx completed in/pt & then proceed with Home hospice   abx complete on 12/1  SW following
Family wants abx completed in/pt & then proceed with Home hospice   SW following

## 2024-11-29 NOTE — PROGRESS NOTE ADULT - PROBLEM SELECTOR PROBLEM 1
Acute hypoxic respiratory failure
Cardiogenic shock
Acute hypoxic respiratory failure
Cardiogenic shock
Acute hypoxic respiratory failure

## 2024-11-29 NOTE — PHYSICAL THERAPY INITIAL EVALUATION ADULT - GENERAL OBSERVATIONS, REHAB EVAL
contact precautions, elderly male pt, denies pain, supplemental nca 02, requires assist to sit in bed and poor endurance contact precautions, elderly male pt, denies pain, supplemental nc 02, requires assist to sit in bed and poor endurance, RN rounding attended patients needs

## 2024-11-29 NOTE — PROGRESS NOTE ADULT - NUTRITIONAL ASSESSMENT
This patient has been assessed with a concern for Malnutrition and has been determined to have a diagnosis/diagnoses of Severe protein-calorie malnutrition.    This patient is being managed with:   Diet Minced and Moist-  Entered: Nov 24 2024  7:44AM  
This patient has been assessed with a concern for Malnutrition and has been determined to have a diagnosis/diagnoses of Severe protein-calorie malnutrition.    This patient is being managed with:   Diet Minced and Moist-  Entered: Nov 24 2024  7:44AM  
This patient has been assessed with a concern for Malnutrition and has been determined to have a diagnosis/diagnoses of Severe protein-calorie malnutrition.    This patient is being managed with:   Diet NPO with Tube Feed-  Tube Feeding Modality: Nasogastric  Jevity 1.5 Reza  Total Volume for 24 Hours (mL): 450  Continuous  Starting Tube Feed Rate {mL per Hour}: 10  Increase Tube Feed Rate by (mL): 10     Every 4 hours  Until Goal Tube Feed Rate (mL per Hour): 25  Tube Feed Duration (in Hours): 18  Tube Feed Start Time: 12:00  Tube Feed Stop Time: 06:00  Entered: Nov 19 2024  7:00PM  
This patient has been assessed with a concern for Malnutrition and has been determined to have a diagnosis/diagnoses of Severe protein-calorie malnutrition.    This patient is being managed with:   Diet NPO with Tube Feed-  Tube Feeding Modality: Nasogastric  Jevity 1.5 Reza  Total Volume for 24 Hours (mL): 450  Continuous  Starting Tube Feed Rate {mL per Hour}: 10  Increase Tube Feed Rate by (mL): 10     Every 4 hours  Until Goal Tube Feed Rate (mL per Hour): 25  Tube Feed Duration (in Hours): 18  Tube Feed Start Time: 12:00  Tube Feed Stop Time: 06:00  Entered: Nov 19 2024  7:00PM  
This patient has been assessed with a concern for Malnutrition and has been determined to have a diagnosis/diagnoses of Severe protein-calorie malnutrition.    This patient is being managed with:   Diet Pureed-  Entered: Nov 23 2024  8:32AM  
This patient has been assessed with a concern for Malnutrition and has been determined to have a diagnosis/diagnoses of Severe protein-calorie malnutrition.    This patient is being managed with:   Diet Minced and Moist-  Entered: Nov 24 2024  7:44AM  
This patient has been assessed with a concern for Malnutrition and has been determined to have a diagnosis/diagnoses of Severe protein-calorie malnutrition.    This patient is being managed with:   Diet NPO with Tube Feed-  Tube Feeding Modality: Nasogastric  Jevity 1.5 Reza  Total Volume for 24 Hours (mL): 450  Continuous  Starting Tube Feed Rate {mL per Hour}: 10  Increase Tube Feed Rate by (mL): 10     Every 4 hours  Until Goal Tube Feed Rate (mL per Hour): 25  Tube Feed Duration (in Hours): 18  Tube Feed Start Time: 12:00  Tube Feed Stop Time: 06:00  Entered: Nov 19 2024  7:00PM  
1. Cardiogenic Shock 2/2 acutely decompensated HFrEF  - unclear etiology. No prior cardiac workup.   Evidence on end organ dysfunction with transaminitis, and lactic acidosis.   TTE p  Lactate improving on inotrope therapy. Continue dobutamine at current dose.   Continue IV diuresis with goal net negative 1-2 liters/24 hours.   If decompensates, consider calling shock team for evaluation.   Palliative team following for GOC discussion- currently remains full code.   Discussed with Dr. Lindsey ICU attending.   2. Transaminitis- improving  3. Hyponatremia   4. Hyperkalemia  5. Acute hypoxic respiratory failure:  - likely 2/2 ADHF   - continue diuresis and vent support
This patient has been assessed with a concern for Malnutrition and has been determined to have a diagnosis/diagnoses of Severe protein-calorie malnutrition.    This patient is being managed with:   Diet Minced and Moist-  Entered: Nov 24 2024  7:44AM  

## 2024-11-29 NOTE — PROGRESS NOTE ADULT - PROBLEM SELECTOR PLAN 5
Family wants abx completed in/pt & then proceed with Home hospice   Pt is DNR/DNI  SW following
DNR  Have tried to reach Buffy several times to revise MOLST to include DNI.     Daughter Buffy is HCP. Will bring papers.  d/w ICU team.
DNR  Intubation to be addressed closer to trial of extubation    Daughter Buffy is HCP.   Unable to find HCP papers from PCP office.   Filled out noew HCP for today making Buffy HCP. Placed in chart. Gave Buffy a copy.    Left message with Buffy. Will work to complete MOLST.  d/w ICU team.
Likely 2/2 to hepatic congestion in the setting of acute decompensated CHF  P/w ALP - 182, ALP - 151, AST - 200  CT A/P 11/18: - Heterogeneous hepatic enhancement which may be related to congestion or underlying hepatic dysfunction  Trend LFTs daily
Family wants abx completed in/pt & then proceed with Home hospice   Pt is DNR/DNI  SW following
DNR  Intubation to be addressed closer to trial of extubation    Daughter Buffy is HCP. Will bring papers.  d/w ICU team.
Family wants abx completed in/pt & then proceed with Home hospice   Pt is DNR/DNI  SW following
DNR/DNI  Daughter Buffy is HCP. Papers in chart.  d/w Dr. Montilla

## 2024-11-29 NOTE — PROGRESS NOTE ADULT - REASON FOR ADMISSION
AHRF 2/2 Acute CHF

## 2024-11-29 NOTE — PROGRESS NOTE ADULT - PROBLEM SELECTOR PLAN 1
P/w shortness of breath, hypoxia, LE swelling.   likely due to new onset acute HF vs COPD exacerbation vs PNA  CT chest 11/25 with multifocal groundglass and nodular opacities, likely infectious.  CTA chest: No PE, Cardiomegaly and Bilateral pleural effusions, left greater than right. Suggestive of cardiogenic failure  Sputum cx grew rare form of psuedomonas / Stenotrophomonas   ID recs: c/w ceftolozane/tazobactam IVPB 1500 every 8 hours + Levaquin 750 mg q24 hrs (7 days of tx) to complete on 12/1  Aspiration precautions  ID Dr. De Souza

## 2024-11-29 NOTE — PROGRESS NOTE ADULT - SUBJECTIVE AND OBJECTIVE BOX
NP Note discussed with  Primary Attending    Patient is a 89y old  Male who presents with a chief complaint of AHRF 2/2 Acute CHF (28 Nov 2024 12:34)      INTERVAL HPI/OVERNIGHT EVENTS: no acute events overnight    MEDICATIONS  (STANDING):  ceftolozane/tazobactam IVPB 1500 milliGRAM(s) IV Intermittent every 8 hours  chlorhexidine 2% Cloths 1 Application(s) Topical <User Schedule>  enoxaparin Injectable 30 milliGRAM(s) SubCutaneous every 24 hours  fluticasone propionate/ salmeterol 100-50 MICROgram(s) Diskus 1 Dose(s) Inhalation two times a day  furosemide    Tablet 40 milliGRAM(s) Oral daily  levoFLOXacin IVPB      levoFLOXacin IVPB 750 milliGRAM(s) IV Intermittent every 24 hours  melatonin 5 milliGRAM(s) Oral at bedtime  pantoprazole   Suspension 40 milliGRAM(s) Oral daily  polyethylene glycol 3350 17 Gram(s) Oral daily  potassium chloride   Powder 40 milliEquivalent(s) Oral every 4 hours    MEDICATIONS  (PRN):  acetaminophen     Tablet .. 650 milliGRAM(s) Oral every 6 hours PRN Temp greater or equal to 38C (100.4F)  albuterol/ipratropium for Nebulization 3 milliLiter(s) Nebulizer every 6 hours PRN Shortness of Breath and/or Wheezing      __________________________________________________  REVIEW OF SYSTEMS:    MELONY due to cognition      Vital Signs Last 24 Hrs  T(C): 36.4 (29 Nov 2024 06:22), Max: 37.1 (28 Nov 2024 13:00)  T(F): 97.5 (29 Nov 2024 06:22), Max: 98.8 (28 Nov 2024 13:00)  HR: 85 (29 Nov 2024 11:06) (77 - 100)  BP: 112/70 (29 Nov 2024 11:06) (106/57 - 114/81)  BP(mean): 79 (29 Nov 2024 11:06) (73 - 89)  RR: 15 (29 Nov 2024 06:22) (15 - 22)  SpO2: 96% (29 Nov 2024 06:22) (96% - 96%)    Parameters below as of 29 Nov 2024 06:22  Patient On (Oxygen Delivery Method): room air        ________________________________________________  PHYSICAL EXAM:  GENERAL: NAD  HEENT: Normocephalic  NECK : supple  CHEST/LUNG: Clear to auscultation bilaterally  HEART: S1 S2  regular  ABDOMEN: Soft, Nontender, Nondistended; Bowel sounds present  EXTREMITIES: no edema  SKIN: warm and dry; no rash  NERVOUS SYSTEM:  Awake and alert; Oriented to person    _________________________________________________  LABS:                        10.6   6.64  )-----------( 112      ( 29 Nov 2024 05:50 )             34.2     11-29    148[H]  |  109[H]  |  38[H]  ----------------------------<  133[H]  3.3[L]   |  32[H]  |  0.86    Ca    9.5      29 Nov 2024 05:50  Phos  2.5     11-29  Mg     2.6     11-29    TPro  6.1  /  Alb  2.7[L]  /  TBili  2.1[H]  /  DBili  x   /  AST  22  /  ALT  51  /  AlkPhos  115  11-28      Urinalysis Basic - ( 29 Nov 2024 05:50 )    Color: x / Appearance: x / SG: x / pH: x  Gluc: 133 mg/dL / Ketone: x  / Bili: x / Urobili: x   Blood: x / Protein: x / Nitrite: x   Leuk Esterase: x / RBC: x / WBC x   Sq Epi: x / Non Sq Epi: x / Bacteria: x      CAPILLARY BLOOD GLUCOSE            RADIOLOGY & ADDITIONAL TESTS:    < from: CT Head No Cont (11.27.24 @ 09:00) >  FINDINGS:  There is periventricular and subcortical white matter hypodensity without   mass effect, nonspecific, likely representing mild chronic microvascular   ischemic changes. There is no compelling evidence for an acute   transcortical infarction. There is no evidence of mass, mass effect,   midline shift or extra-axial fluid collection. The lateral ventricles and   cortical sulci are age-appropriate in size and configuration. Evaluation   of the solid organs is limited secondary to the lack of IV contrast. The   liver, gallbladder, spleen, pancreas, adrenal glands and kidneys are   unremarkable. The orbits, mastoid air cells and visualized paranasal   sinuses are unremarkable. The calvarium is intact. Consider MRI as   clinically warranted.    IMPRESSION:  Mild chronic microvascular changes without evidence of an   acute transcortical infarction or hemorrhage.    --- End of Report ---    < end of copied text >    Imaging Personally Reviewed:  YES    Consultant(s) Notes Reviewed:   YES    Plan of care was discussed with patient and /or primary care giver; all questions and concerns were addressed and care was aligned with patient's wishes.

## 2024-11-29 NOTE — PROGRESS NOTE ADULT - NS ATTEND AMEND GEN_ALL_CORE FT
ID [id number]  Patient seen and examined at bedside this morning. He is awake, lying comfortably in bed. He nods no to most of the 's questions but does not verbally respond. She nods no when asked if any pain or trouble breathing.  Am vitals, labs reviewed. Afebrile. No leukocytosis, Hgb stable. K 3.3. CTH reviewed - no acute bleed seen. PE - thin, cachetic, elderly male, lying in bed, RRR, mild scattered rhonchi, trace crackles, abd soft ntnd, no LE edema    A/P   89M with pmh of COPD, prostate cancer s/p RT was said to have presented to the ED with worsening SOB associated with leg swelling and in the ED he was placed on bipap and thereafter intubated and placed on mechanical ventilation. Extubated and downgraded to floor. Found to have CRE pseudomonas and Stenotrophomonas maltophilia in Sputum culture. Palliative assessed and plan for home hospice after completion of Abx course.     #Acute hypoxic respiratory failure s/p extubation   #Acute decompensated heart failure    #Multifocal pneumonia  - growing Pseudomonas / Stenotrophomonas   #Bilateral pleural effusions from heart failure   #Lactic acidosis likely Type A   #Hyponatremia   #GB stones with wall thickening likely edema   #LL nodule   #H/o COPD   #H/o prostate cancer s/p RT   #Hypokalemia  #Severe protein-calorie malnutrition    -Extubated 11/20, then on 2L NC, weaned to room air  -c/w oral lasix 40mg daily   -Unable to initiate GDMT given low BP    -Dysphagia screening passed - advance diet - aspiration precaution   -DVT/GI prophylaxis   - Also found to have CRE pseudomonas and Stenotrophomonas maltophilia in Sputum culture     - ID Dr. De Souza, plan discussed - ceftolozane/tazobactam IVPB 1500 every 8 hours + Levaquin 750 mg q24 hrs (7 days of tx total until Dec 1)       - No PO alternative for ceftolozane - Discussed with daughter over phone, regarding if would like to continue IV abx or DC with hospice, per discussion she would prefer her father to complete the abx prior to returning home   -fall precautions, oobtc with assistance  -PT eval rec home PT  -replete K  -pending home hospice instatement after completion of abx, [facility] care SW aware of this

## 2024-11-29 NOTE — PROGRESS NOTE ADULT - PROBLEM SELECTOR PLAN 4
Home hospice pending  SW following
on home symbicort 80 mcg-4.5mcg 2 puffs Q12 and albuterol 1.25mg q6  - continue with symbicort 2 puff daily
Per Chuy Baer had been ambulatory and independent prior to this illness
Per Chuy Baer had been ambulatory and independent prior to this illness  Now bedbound
Per Chuy Baer had been ambulatory and independent prior to this illness
on home symbicort 80 mcg-4.5mcg 2 puffs Q12 and albuterol 1.25mg q6  - continue with symbicort 2 puff daily
Per Chuy Baer had been ambulatory and independent prior to this illness  Now bedbound
on home symbicort 80 mcg-4.5mcg 2 puffs Q12 and albuterol 1.25mg q6  - continue with symbicort 2 puff daily

## 2024-11-29 NOTE — PROGRESS NOTE ADULT - PROVIDER SPECIALTY LIST ADULT
Cardiology
Cardiology
Internal Medicine
Internal Medicine
Critical Care
Cardiology
Infectious Disease
Internal Medicine
Internal Medicine
Palliative Care
Internal Medicine
Cardiology
Palliative Care
Internal Medicine
Internal Medicine

## 2024-11-29 NOTE — PROGRESS NOTE ADULT - PROBLEM SELECTOR PROBLEM 3
HF (heart failure)
Hyponatremia
Hyponatremia
Chronic obstructive pulmonary disease
HF (heart failure)
HF (heart failure)
Chronic obstructive pulmonary disease

## 2024-11-29 NOTE — PROGRESS NOTE ADULT - ASSESSMENT
Patient is a 89 y/o M with PMH COPD, and prostate cancer (in remission s/p radiation) who presented with 2 day history of worsening shortness of breath, and confusion. As per daughter patient had been complaining of worsening shortness of breath and difficulty breathing for 2 days associated with LE swelling, and orthopnea. In the ED patient was given ceftriaxone, azithromycin, and given 3L IV fluid. Bedside POCUS revealed b/l B-lines, significant dilated cardiomyopathy and poor contractility.  Admitted for AHRF likely 2/2 to acute systolic HF.   Extubated on 11/20 to nasal cannula and currently on room air   GOC discussion held with patient and family by Palliative Dr. Smith, patient now DNR/DNI with plan for home hospice on discharge.   Sputum grew a rare form of pseudomonas, on contact isolation. ID Dr. De Souza consulted. IV abx to complete on 12/1 and then DC home

## 2024-11-29 NOTE — PROGRESS NOTE ADULT - PROBLEM SELECTOR PLAN 3
New onset acute systolic HF  shortness of breath, hypoxia, LE swelling. Family reports no known history of HF, shares 4 week history of LE sourav  -AHRF 2/2 decompensated CHF requiring intubation- extubated 11/20)   -Cardiogenic shock (EF 18%)  Pro-BNP 11/18 10,828  Trop 11/18 PEAKED AT 60.6.   EKG - NSR w/ left bundle branch block and PVCs.   continue lasix to 40mg  QD   TTE 11/20 Left ventricular systolic function is severely decreased with a calculated ejection fraction of 18 % by the Mejias's biplane method of disks. There is global left ventricular hypokinesis. There is moderate (grade 2) left ventricular diastolic dysfunction.  - Cardiology Dr. Flanagan recs to continue medical management.
Not end-stage. Was not on 02 at home  Now a significant comorbidity with the CHF
-improving.
on home symbicort 80 mcg-4.5mcg 2 puffs Q12 and albuterol 1.25mg q6  - continue with symbicort 2 puff daily
-improving
Not end-stage. Was not on 02 at home  Now a significant comorbidity with the CHF
New onset acute systolic HF  shortness of breath, hypoxia, LE swelling. Family reports no known history of HF, shares 4 week history of LE sourav  -AHRF 2/2 decompensated CHF requiring intubation- extubated 11/20)   -Cardiogenic shock (EF 18%)  Pro-BNP 11/18 10,828  Trop 11/18 PEAKED AT 60.6.   EKG - NSR w/ left bundle branch block and PVCs.   continue lasix to 40mg  QD   TTE 11/20 Left ventricular systolic function is severely decreased with a calculated ejection fraction of 18 % by the Mejias's biplane method of disks. There is global left ventricular hypokinesis. There is moderate (grade 2) left ventricular diastolic dysfunction.  - Cardiology Dr. Flanagan recs to continue medical management.
New onset acute systolic HF  shortness of breath, hypoxia, LE swelling. Family reports no known history of HF, shares 4 week history of LE sourav  -AHRF 2/2 decompensated CHF requiring intubation- extubated 11/20)   -Cardiogenic shock (EF 18%)  Pro-BNP 11/18 10,828  Trop 11/18 PEAKED AT 60.6.   EKG - NSR w/ left bundle branch block and PVCs.   continue lasix to 40mg  QD   TTE 11/20 Left ventricular systolic function is severely decreased with a calculated ejection fraction of 18 % by the Mejias's biplane method of disks. There is global left ventricular hypokinesis. There is moderate (grade 2) left ventricular diastolic dysfunction.  - Cardiology Dr. Flanagan recs to continue medical management.

## 2024-11-29 NOTE — CHART NOTE - NSCHARTNOTESELECT_GEN_ALL_CORE
Event Note
palliative care/Event Note
Downgrade Note/Event Note
Event Note
Event Note
Hypokalemia/Event Note
Nutrition Services

## 2024-11-29 NOTE — PROGRESS NOTE ADULT - PROBLEM SELECTOR PROBLEM 4
Chronic obstructive pulmonary disease
Chronic obstructive pulmonary disease
Debility
Palliative care encounter
Chronic obstructive pulmonary disease
Debility

## 2024-11-29 NOTE — PROGRESS NOTE ADULT - PROBLEM SELECTOR PLAN 6
Home with hospice after medically optimized  Guarded prognosis
Home with hospice after medically optimized  Guarded prognosis
Likely 2/2 to hepatic congestion in the setting of acute decompensated CHF  P/w ALP - 182, ALP - 151, AST - 200  CT A/P 11/18: - Heterogeneous hepatic enhancement which may be related to congestion or underlying hepatic dysfunction  Trend LFTs daily
Home with hospice after medically optimized  Guarded prognosis
Likely 2/2 to hepatic congestion in the setting of acute decompensated CHF  P/w ALP - 182, ALP - 151, AST - 200  CT A/P 11/18: - Heterogeneous hepatic enhancement which may be related to congestion or underlying hepatic dysfunction  Trend LFTs daily
Likely 2/2 to hepatic congestion in the setting of acute decompensated CHF  P/w ALP - 182, ALP - 151, AST - 200  CT A/P 11/18: - Heterogeneous hepatic enhancement which may be related to congestion or underlying hepatic dysfunction  Trend LFTs daily
Home with hospice after medically optimized  Guarded prognosis

## 2024-11-29 NOTE — PROGRESS NOTE ADULT - PROBLEM SELECTOR PROBLEM 5
Palliative care encounter
Transaminitis
Palliative care encounter

## 2024-11-30 NOTE — DISCHARGE NOTE FOR THE EXPIRED PATIENT - TIME PATIENT HAD NO SPONTANEOUS RESPIRATION AND ABSENCE OF PULSE
30-Nov-2024 02:02 [FreeTextEntry1] : Mr. Gutierrez presents for follow up. Since last being seen he has lost 25-30 lbs. He notes BP has been better. He denies cardiac complaints. He still has atypical chest pain. No specific pattern. Usually under the left arm pit. Can lest minutes to hours. No associated symptoms. No radiation. Not exertional.

## 2024-11-30 NOTE — DISCHARGE NOTE FOR THE EXPIRED PATIENT - HOSPITAL COURSE
89 y/o M with PMH COPD, and prostate cancer (in remission s/p radiation) who presented with 2 day history of worsening shortness of breath, and confusion. As per daughter patient had been complaining of worsening shortness of breath and difficulty breathing for 2 days associated with LE swelling, and orthopnea. In the ED patient was given ceftriaxone, azithromycin, and given 3L IV fluid. Bedside POCUS revealed b/l B-lines, significant dilated cardiomyopathy and poor contractility.  Admitted for AHRF likely 2/2 to acute systolic HF.   Extubated on 11/20/24 to nasal cannula and currently on room air.  GOC discussion held with patient and family by Palliative Dr. Smith, patient now DNR/DNI with plan for home hospice on discharge.   Sputum grew a rare form of pseudomonas, on contact isolation. ID Dr. De Souza consulted. IV abx to complete on 12/1 and then DC home on home hospice.

## 2024-11-30 NOTE — DISCHARGE NOTE FOR THE EXPIRED PATIENT - OTHER SIGNIFICANT FINDINGS
I was called by the nurse to come and examine patient for unresponsiveness. On arrival to the room, patient had no spontaneous movements, pulses non palpable. Absent heart and breath sounds. Pupils fixed and dilated. Patient was DNR/DNI, awaiting discharge to home on hospice care. Patient was pronounced dead at 2:05AM. Attending Physician, Dr. Jose Roberto Vines made aware. Organ donor notified. Patient's daughter, Buffy Salinas made aware.